# Patient Record
Sex: MALE | Race: WHITE | Employment: OTHER | URBAN - METROPOLITAN AREA
[De-identification: names, ages, dates, MRNs, and addresses within clinical notes are randomized per-mention and may not be internally consistent; named-entity substitution may affect disease eponyms.]

---

## 2023-02-23 ENCOUNTER — HOSPITAL ENCOUNTER (EMERGENCY)
Age: 88
Discharge: HOME | End: 2023-02-23
Attending: EMERGENCY MEDICINE
Payer: MEDICARE

## 2023-02-23 ENCOUNTER — APPOINTMENT (OUTPATIENT)
Dept: CT IMAGING | Age: 88
End: 2023-02-23
Payer: MEDICARE

## 2023-02-23 VITALS
DIASTOLIC BLOOD PRESSURE: 61 MMHG | TEMPERATURE: 98.2 F | BODY MASS INDEX: 26.05 KG/M2 | OXYGEN SATURATION: 97 % | SYSTOLIC BLOOD PRESSURE: 134 MMHG | WEIGHT: 182 LBS | RESPIRATION RATE: 21 BRPM | HEART RATE: 78 BPM | HEIGHT: 70 IN

## 2023-02-23 DIAGNOSIS — N17.9 AKI (ACUTE KIDNEY INJURY) (HCC): ICD-10-CM

## 2023-02-23 DIAGNOSIS — R82.81 PYURIA: ICD-10-CM

## 2023-02-23 DIAGNOSIS — R31.9 HEMATURIA, UNSPECIFIED TYPE: Primary | ICD-10-CM

## 2023-02-23 LAB
ANION GAP SERPL CALCULATED.3IONS-SCNC: 13 MMOL/L (ref 3–16)
BACTERIA: ABNORMAL /HPF
BASOPHILS ABSOLUTE: 0 K/UL (ref 0–0.2)
BASOPHILS RELATIVE PERCENT: 0.3 %
BILIRUBIN URINE: NEGATIVE
BLOOD, URINE: ABNORMAL
BUN BLDV-MCNC: 24 MG/DL (ref 7–20)
CALCIUM SERPL-MCNC: 9.3 MG/DL (ref 8.3–10.6)
CHLORIDE BLD-SCNC: 104 MMOL/L (ref 99–110)
CLARITY: ABNORMAL
CO2: 21 MMOL/L (ref 21–32)
COLOR: ABNORMAL
COMMENT UA: ABNORMAL
CREAT SERPL-MCNC: 2 MG/DL (ref 0.8–1.3)
EOSINOPHILS ABSOLUTE: 0 K/UL (ref 0–0.6)
EOSINOPHILS RELATIVE PERCENT: 0 %
GFR SERPL CREATININE-BSD FRML MDRD: 32 ML/MIN/{1.73_M2}
GLUCOSE BLD-MCNC: 129 MG/DL (ref 70–99)
GLUCOSE URINE: NEGATIVE MG/DL
HCT VFR BLD CALC: 38.6 % (ref 40.5–52.5)
HEMOGLOBIN: 12.3 G/DL (ref 13.5–17.5)
KETONES, URINE: 15 MG/DL
LEUKOCYTE ESTERASE, URINE: ABNORMAL
LYMPHOCYTES ABSOLUTE: 1 K/UL (ref 1–5.1)
LYMPHOCYTES RELATIVE PERCENT: 7.2 %
MCH RBC QN AUTO: 24.8 PG (ref 26–34)
MCHC RBC AUTO-ENTMCNC: 31.8 G/DL (ref 31–36)
MCV RBC AUTO: 78.1 FL (ref 80–100)
MICROSCOPIC EXAMINATION: YES
MONOCYTES ABSOLUTE: 0.8 K/UL (ref 0–1.3)
MONOCYTES RELATIVE PERCENT: 6.1 %
NEUTROPHILS ABSOLUTE: 12 K/UL (ref 1.7–7.7)
NEUTROPHILS RELATIVE PERCENT: 86.4 %
NITRITE, URINE: NEGATIVE
PDW BLD-RTO: 16.6 % (ref 12.4–15.4)
PH UA: 5.5 (ref 5–8)
PLATELET # BLD: 228 K/UL (ref 135–450)
PMV BLD AUTO: 8.2 FL (ref 5–10.5)
POTASSIUM REFLEX MAGNESIUM: 3.7 MMOL/L (ref 3.5–5.1)
PROTEIN UA: >=300 MG/DL
RBC # BLD: 4.95 M/UL (ref 4.2–5.9)
RBC UA: >100 /HPF (ref 0–4)
SODIUM BLD-SCNC: 138 MMOL/L (ref 136–145)
SPECIFIC GRAVITY UA: 1.02 (ref 1–1.03)
SPECIMEN STATUS: NORMAL
URINE REFLEX TO CULTURE: YES
URINE TYPE: ABNORMAL
UROBILINOGEN, URINE: 0.2 E.U./DL
WBC # BLD: 13.9 K/UL (ref 4–11)
WBC UA: ABNORMAL /HPF (ref 0–5)

## 2023-02-23 PROCEDURE — 74176 CT ABD & PELVIS W/O CONTRAST: CPT

## 2023-02-23 PROCEDURE — 87086 URINE CULTURE/COLONY COUNT: CPT

## 2023-02-23 PROCEDURE — 99284 EMERGENCY DEPT VISIT MOD MDM: CPT

## 2023-02-23 PROCEDURE — 2580000003 HC RX 258: Performed by: EMERGENCY MEDICINE

## 2023-02-23 PROCEDURE — 85025 COMPLETE CBC W/AUTO DIFF WBC: CPT

## 2023-02-23 PROCEDURE — 80048 BASIC METABOLIC PNL TOTAL CA: CPT

## 2023-02-23 PROCEDURE — 81001 URINALYSIS AUTO W/SCOPE: CPT

## 2023-02-23 RX ORDER — 0.9 % SODIUM CHLORIDE 0.9 %
500 INTRAVENOUS SOLUTION INTRAVENOUS ONCE
Status: COMPLETED | OUTPATIENT
Start: 2023-02-23 | End: 2023-02-23

## 2023-02-23 RX ORDER — CIPROFLOXACIN 500 MG/1
500 TABLET, FILM COATED ORAL 2 TIMES DAILY
Qty: 14 TABLET | Refills: 0 | Status: SHIPPED | OUTPATIENT
Start: 2023-02-23 | End: 2023-03-02

## 2023-02-23 RX ORDER — TAMSULOSIN HYDROCHLORIDE 0.4 MG/1
0.4 CAPSULE ORAL DAILY
Qty: 30 CAPSULE | Refills: 0 | Status: SHIPPED | OUTPATIENT
Start: 2023-02-23

## 2023-02-23 RX ADMIN — SODIUM CHLORIDE 500 ML: 9 INJECTION, SOLUTION INTRAVENOUS at 09:13

## 2023-02-23 NOTE — DISCHARGE INSTRUCTIONS
Urology would like you to follow-up within the next 2 days. We are prescribing antibiotics and Flomax. Take as prescribed. Be careful with standing as Flomax can make you lightheaded initially.   Return to emergency department with any new or emergent concerns

## 2023-02-23 NOTE — ED NOTES
Called [facility] @ [phone] RE: stent, pain   Requested Dr. Gila Pressley.   Arielle MODI returned call and spoke with Rock Murguia (medical student under supervision of Dr. Frederick Oh)     Qing Coronado  02/23/23 5488

## 2023-02-23 NOTE — ED PROVIDER NOTES
Emergency Department Provider Note  Location: [address] [place] [address]  ED  2/23/2023     Patient Identification  Rivas Reynolds is a 81 y.o. male    Chief Complaint  Hematuria (Pt had a kidney procedure done at [facility], has been having some blood in the urine. Also, been having some pain after I urinate. )          HPI  (History provided by patient)  Patient is an 69-year-old male with history of urothelial carcinoma of the bladder and neoplasm of kidney type uncertain hypertension hyperlipidemia who presents with chief complaint of hematuria and dysuria. Had cystoscopy performed yesterday by Dr. Devin Mai at [address]; RIGHT FLEXIBLE DIAGNOSTIC URETEROSCOPY; RIGHT URETERAL STENT PLACEMENT; ABLATION OF RIGHT RENAL PELCIS TUMOR; RIGHT RETROGRADE PYELOGRAM.      I have reviewed the following nursing documentation:  Allergies: Allergies   Allergen Reactions    Lisinopril Other (See Comments)     Possible Allergic reaction - developed Angioedema (tongue swelling) after 1 week of taking Doxycycline (only new medication). Cannot exclude Lisinopril as causative agent (chronic medication). Doxycycline Swelling     Possible Allergic reaction - developed Angioedema (tongue swelling) after 1 week of taking Doxycycline (only new medication). Cannot exclude Lisinopril as causative agent (chronic medication). Ibuprofen     Pcn [Penicillins]        Past medical history:  has a past medical history of Acid reflux, Asthenia pigmentosa ([address] BPH (benign prostatic hyperplasia), Hyperlipidemia, Hypertension, Irregular heart beat, Kidney stones, Legal blindness, and Retinitis. Past surgical history:  has a past surgical history that includes hernia repair (Right); Cholecystectomy; back surgery; TURP; eye surgery (Bilateral); and other surgical history (N/A, 10/14/2016). Home medications:   Prior to Admission medications    Medication Sig Start Date End Date Taking?  Authorizing Provider   tamsulosin (FLOMAX) 0.4 MG capsule Take 1 capsule by mouth daily 2/23/23  Yes Hunter Sandvoal MD   ciprofloxacin (CIPRO) 500 MG tablet Take 1 tablet by mouth 2 times daily for 7 days 2/23/23 3/2/23 Yes Hunter Sandoval MD   famotidine (PEPCID) 20 MG tablet Take 1 tablet by mouth 2 times daily for 7 days 10/25/16 11/1/16  Pradeep Vivar MD   hydrochlorothiazide (HYDRODIURIL) 25 MG tablet Take 0.5 tablets by mouth daily 10/25/16 11/24/16  Pradeep Vivar MD   Calcium Carbonate-Vitamin D (CALCIUM-VITAMIN D) 500-200 MG-UNIT per tablet Take 1 tablet by mouth daily    Historical Provider, MD   Multiple Vitamins-Minerals (THERAPEUTIC MULTIVITAMIN-MINERALS) tablet Take 1 tablet by mouth daily    Historical Provider, MD   omeprazole (PRILOSEC) 20 MG delayed release capsule Take 20 mg by mouth daily  Patient not taking: Reported on 2/23/2023    Historical Provider, MD   simvastatin (ZOCOR) 20 MG tablet Take 20 mg by mouth nightly    Historical Provider, MD   Vitamin A Palmitate 45601 UNITS TABS Take 1 tablet by mouth daily  Patient not taking: Reported on 2/23/2023    Historical Provider, MD       Social history:  reports that he has never smoked. He has never used smokeless tobacco. He reports that he does not drink alcohol and does not use drugs.    Family history:    Family History   Problem Relation Age of Onset    Diabetes Mother     High Blood Pressure Mother     Heart Disease Father     Cancer Brother              Exam  ED Triage Vitals [02/23/23 0837]   BP Temp Temp Source Heart Rate Resp SpO2 Height Weight   (!) 157/80 98.2 °F (36.8 °C) Oral 82 20 99 % 5' 10\" (1.778 m) 182 lb (82.6 kg)       Physical Exam  Vitals and nursing note reviewed.   Constitutional:       General: He is not in acute distress.     Appearance: He is well-developed.   HENT:      Head: Normocephalic and atraumatic.      Nose: Nose normal. No congestion.   Eyes:      General: No scleral icterus.     Extraocular Movements: Extraocular  movements intact. Cardiovascular:      Rate and Rhythm: Normal rate and regular rhythm. Heart sounds: No murmur heard. Pulmonary:      Effort: Pulmonary effort is normal.      Breath sounds: Normal breath sounds. Abdominal:      General: There is no distension. Palpations: Abdomen is soft. Tenderness: There is no abdominal tenderness. There is no right CVA tenderness, left CVA tenderness, guarding or rebound. Musculoskeletal:         General: No deformity. Normal range of motion. Cervical back: Normal range of motion and neck supple. Skin:     General: Skin is warm. Findings: No rash. Neurological:      Mental Status: He is alert and oriented to person, place, and time. Motor: No abnormal muscle tone. Coordination: Coordination normal.   Psychiatric:         Mood and Affect: Mood normal.         Behavior: Behavior normal.         MDM/ED Course    ED Medication Orders (From admission, onward)      Start Ordered     Status Ordering Provider    02/23/23 0915 02/23/23 0906  0.9 % sodium chloride bolus  ONCE         Last MAR action: Stopped - by Jen Harris on 02/23/23 at [address] [facility], [address] [facility] [address]              Radiology  CT ABDOMEN PELVIS WO CONTRAST Additional Contrast? None    Result Date: 2/23/2023  EXAMINATION: CT OF THE ABDOMEN AND PELVIS WITHOUT CONTRAST 2/23/2023 9:19 am TECHNIQUE: CT of the abdomen and pelvis was performed without the administration of intravenous contrast. Multiplanar reformatted images are provided for review. Automated exposure control, iterative reconstruction, and/or weight based adjustment of the mA/kV was utilized to reduce the radiation dose to as low as reasonably achievable. COMPARISON: None.  HISTORY: ORDERING SYSTEM PROVIDED HISTORY: recent ureter stent placment, right flank pain TECHNOLOGIST PROVIDED HISTORY: Reason for exam:->recent ureter stent placment, right flank pain Additional Contrast?->None Decision Support Exception - unselect if not a suspected or confirmed emergency medical condition->Emergency Medical Condition (MA) Reason for Exam: right ureter placement yesterday, c/o pain when urinating with n/v, right flank pain, hematuria Relevant Medical/Surgical History: h/o renal ca FINDINGS: Lower Chest: Scattered bandlike opacities are seen throughout the lungs, likely atelectasis. Shea Benton No pleural effusions noted. Organs: No splenomegaly. No perisplenic fluid. Adrenal glands unremarkable. Clips are seen from prior cholecystectomy. Pneumobilia is seen in the left hepatic lobe. No pancreatic calcification noted. No peripancreatic fluid Gas is seen in the collecting system on the right. Ureteral stent is seen on the right, in its expected location. A dominant cyst projects off the right kidney posteriorly measuring 5.7 cm. A smaller cyst is seen anteriorly. There is increased density seen in the collecting system on the right and right renal pelvis, most likely secondary to hemorrhage. There is mild right-sided hydronephrosis. Right ureter is not dilated. There is perinephric fat stranding on the right. There is stranding and scattered ill-defined fluid seen in the retroperitoneum on the right. Tiny stone is seen inferiorly in the right kidney, measuring 2 mm Dominant cyst is seen in the left kidney, measuring 6.5 cm. Small hyperdense nodules are seen in the left kidney, measuring 1.1 cm, most commonly hemorrhagic or proteinaceous cyst.  No stones or hydronephrosis noted on left. GI/Bowel: No significant small bowel distention noted. Mild stool load seen in colon. No bowel obstruction. A few colonic diverticula are seen. Pelvis: Gas is seen within the bladder. Prostate measures 6.5 cm x 5.0 cm. Prostate indents the base of the bladder. Peritoneum/Retroperitoneum: Atherosclerotic change seen in aorta. No aneurysm. No retroperitoneal adenopathy. Bones/Soft Tissues: Postfusion changes are seen in the spine.   Scattered degenerative changes are seen in the spine. Degenerative changes are seen in the hips. Ureteral stent is seen on the right in its expected location. There is mild right-sided hydronephrosis seen. Hemorrhage is seen in the right renal collecting system-right renal pelvis. Recommend correlation with outside studies or continued clinical follow-up as this area of hyperdensity in the renal pelvis could also represent mass. Gas is seen in the bladder and collecting system on the right, likely from stent placement. Tiny stone inferiorly in the right kidney Ill-defined fluid and stranding is seen surrounding the right kidney in the retroperitoneum on the right, likely postprocedure related. Diverticulosis. No bowel obstruction Prostatomegaly      Bedside Ultrasound  No results found.        Labs  Results for orders placed or performed during the hospital encounter of 02/23/23   CBC with Auto Differential   Result Value Ref Range    WBC 13.9 (H) 4.0 - 11.0 K/uL    RBC 4.95 4.20 - 5.90 M/uL    Hemoglobin 12.3 (L) 13.5 - 17.5 g/dL    Hematocrit 38.6 (L) 40.5 - 52.5 %    MCV 78.1 (L) 80.0 - 100.0 fL    MCH 24.8 (L) 26.0 - 34.0 pg    MCHC 31.8 31.0 - 36.0 g/dL    RDW 16.6 (H) 12.4 - 15.4 %    Platelets 605 009 - 196 K/uL    MPV 8.2 5.0 - 10.5 fL    Neutrophils % 86.4 %    Lymphocytes % 7.2 %    Monocytes % 6.1 %    Eosinophils % 0.0 %    Basophils % 0.3 %    Neutrophils Absolute 12.0 (H) 1.7 - 7.7 K/uL    Lymphocytes Absolute 1.0 1.0 - 5.1 K/uL    Monocytes Absolute 0.8 0.0 - 1.3 K/uL    Eosinophils Absolute 0.0 0.0 - 0.6 K/uL    Basophils Absolute 0.0 0.0 - 0.2 K/uL   Basic Metabolic Panel w/ Reflex to MG   Result Value Ref Range    Sodium 138 136 - 145 mmol/L    Potassium reflex Magnesium 3.7 3.5 - 5.1 mmol/L    Chloride 104 99 - 110 mmol/L    CO2 21 21 - 32 mmol/L    Anion Gap 13 3 - 16    Glucose 129 (H) 70 - 99 mg/dL    BUN 24 (H) 7 - 20 mg/dL    Creatinine 2.0 (H) 0.8 - 1.3 mg/dL    Est, Glom Filt Rate 32 (A) >60    Calcium 9.3 8.3 - 10.6 mg/dL   Urinalysis with Reflex to Culture    Specimen: Urine   Result Value Ref Range    Color, UA RED (A) Straw/Yellow    Clarity, UA TURBID (A) Clear    Glucose, Ur Negative Negative mg/dL    Bilirubin Urine Negative Negative    Ketones, Urine 15 (A) Negative mg/dL    Specific Gravity, UA 1.025 1.005 - 1.030    Blood, Urine LARGE (A) Negative    pH, UA 5.5 5.0 - 8.0    Protein, UA >=300 (A) Negative mg/dL    Urobilinogen, Urine 0.2 <2.0 E.U./dL    Nitrite, Urine Negative Negative    Leukocyte Esterase, Urine SMALL (A) Negative    Microscopic Examination YES     Urine Type NotGiven     Urine Reflex to Culture Yes    Sample possible blood bank testing   Result Value Ref Range    Specimen Status HOLLY    Microscopic Urinalysis   Result Value Ref Range    WBC, UA 10-20 (A) 0 - 5 /HPF    RBC, UA >100 (A) 0 - 4 /HPF    Bacteria, UA 4+ (A) None Seen /HPF    Urinalysis Comments see below          Procedures  Procedures      Patient seen and evaluated. Relevant records reviewed, specifically surgical note from [facility] Dr. Olivia Dacosta yesterday showing procedures CYSTOSCOPY; RIGHT FLEXIBLE DIAGNOSTIC URETEROSCOPY; RIGHT URETERAL STENT PLACEMENT; ABLATION OF RIGHT RENAL PELCIS TUMOR; RIGHT RETROGRADE PYELOGRAM.  - Patient is 81 y.o. male presented for acute hematuria in setting or chronic conditions urothelial carcinoma and neoplasm of kidney on certain type hypertension hyperlipidemia.  - Exam showed well-appearing elderly male no acute distress currently pain-free lying in bed. He has reassuring vital signs. He has no significant tenderness to the abdomen or the flank or CVA  - Patient was placed on telemetry during his/her ED stay and no malignant dysrhythmia observed. - Diagnostic studies reviewed. CT abdomen pelvis shows evidence of small hemorrhage versus mass in the right renal pelvis which correlates with his known mass/neoplasm. There is no ureteral tear and no severe hydronephrosis noted.   - Lab: CMP is notable for elevated creatinine at 2.0 which is elevated from priors that we have on record 1.3 which may be secondary to recent procedure or dehydration. We will treat him with IV fluids. Also noted pyuria which may be reactive nitrite negative but questionable for infection. We will treat conservatively with Keflex antibiotic. Otherwise CBC did not show clinically significant pathology.   -Consulted urology and spoke with PA covering for Dr. Mariela Ellington who discussed the case and the CT findings and lab findings with Dr. Mariela Ellington and feel that he would be appropriate for very close outpatient follow-up. I do think this is reasonable as the primary reason for his visit today appears to be for worse pain than was expected or discussed to the patient at the time of discharge with urination. Have low concern for any emergent pathology at this point and feel that it is in the best interest of the patient to discharge home rather than keeping him in the hospital.    - Is this patient to be included in the SEP-1 Core Measure due to severe sepsis or septic shock? No   Exclusion criteria - the patient is NOT to be included for SEP-1 Core Measure due to:  2+ SIRS criteria are not met    - Patient was given    ED Medication Orders (From admission, onward)      Start Ordered     Status Ordering Provider    02/23/23 0915 02/23/23 0906  0.9 % sodium chloride bolus  ONCE         Last MAR action: Stopped - by Oralia Urbina on 02/23/23 at [address] [facility], [place] L              - I discussed the results with patient/family including incidental findings . We agreed to d/c  - At this point I do not see indication for further work-up in the ER, as it is unlikely  and poses more risk than benefit. - Return precautions also discussed. Patient/family verbalized understanding of care plan and agreeable to plan. Clinical Impression:  1. Hematuria, unspecified type    2.  TIERA (acute kidney injury) ([facility]    3. Pyuria          Disposition:  Discharge to home in good condition. Blood pressure 134/61, pulse 78, temperature 98.2 °F (36.8 °C), temperature source Oral, resp. rate 21, height 5' 10\" (1.778 m), weight 182 lb (82.6 kg), SpO2 97 %. Patient was given scripts for the following medications. I counseled patient how to take these medications. Discharge Medication List as of 2/23/2023 11:40 AM        START taking these medications    Details   tamsulosin (FLOMAX) 0.4 MG capsule Take 1 capsule by mouth daily, Disp-30 capsule, R-0Print      ciprofloxacin (CIPRO) 500 MG tablet Take 1 tablet by mouth 2 times daily for 7 days, Disp-14 tablet, R-0Print           Discharge Medication List as of 2/23/2023 11:40 AM          Disposition referral (if applicable):  Halle Goodwin MD    Follow up  call Dr Staci Friend for an appt for follow up    [facility] [address] [address]  [phone]    Schedule an appointment as soon as possible for a visit       I, Vearl Lanes, am the primary attending of record and contributed the majority of evaluation and treatment of emergent care for this encounter. Total critical care time is 0 minutes, which excludes separately billable procedures and updating family. Time spent is specifically for management of the presenting complaint and symptoms initially, direct bedside care, reevaluation, review of records, and consultation. There was a high probability of clinically significant life-threatening deterioration in the patient's condition, which required my urgent intervention. This chart was generated in part by using Dragon Dictation system and may contain errors related to that system including errors in grammar, punctuation, and spelling, as well as words and phrases that may be inappropriate. If there are any questions or concerns please feel free to contact the dictating provider for clarification.      Hunter HANKS Fidel Sandoval MD  02/24/23 1970

## 2023-02-24 LAB — URINE CULTURE, ROUTINE: NORMAL

## 2024-07-05 ENCOUNTER — HOSPITAL ENCOUNTER (INPATIENT)
Age: 89
LOS: 4 days | Discharge: HOME HEALTH | DRG: 988 | End: 2024-07-09
Attending: EMERGENCY MEDICINE | Admitting: INTERNAL MEDICINE
Payer: MEDICARE

## 2024-07-05 ENCOUNTER — APPOINTMENT (OUTPATIENT)
Dept: GENERAL RADIOLOGY | Age: 89
DRG: 988 | End: 2024-07-05
Payer: MEDICARE

## 2024-07-05 DIAGNOSIS — I48.91 ATRIAL FIBRILLATION, UNSPECIFIED TYPE (HCC): ICD-10-CM

## 2024-07-05 DIAGNOSIS — N32.89 BLOOD CLOT IN BLADDER: ICD-10-CM

## 2024-07-05 DIAGNOSIS — I48.0 PAROXYSMAL A-FIB (HCC): ICD-10-CM

## 2024-07-05 DIAGNOSIS — S30.1XXA ABDOMINAL WALL SEROMA, INITIAL ENCOUNTER: ICD-10-CM

## 2024-07-05 DIAGNOSIS — I48.91 ATRIAL FIBRILLATION WITH RVR (HCC): Primary | ICD-10-CM

## 2024-07-05 LAB
ABO + RH BLD: NORMAL
ALBUMIN SERPL-MCNC: 2.8 G/DL (ref 3.4–5)
ALBUMIN/GLOB SERPL: 1 {RATIO} (ref 1.1–2.2)
ALP SERPL-CCNC: 67 U/L (ref 40–129)
ALT SERPL-CCNC: 19 U/L (ref 10–40)
ANION GAP SERPL CALCULATED.3IONS-SCNC: 10 MMOL/L (ref 3–16)
AST SERPL-CCNC: 18 U/L (ref 15–37)
BASOPHILS # BLD: 0 K/UL (ref 0–0.2)
BASOPHILS NFR BLD: 0.5 %
BILIRUB SERPL-MCNC: 0.7 MG/DL (ref 0–1)
BLD GP AB SCN SERPL QL: NORMAL
BUN SERPL-MCNC: 15 MG/DL (ref 7–20)
CALCIUM SERPL-MCNC: 8.2 MG/DL (ref 8.3–10.6)
CHLORIDE SERPL-SCNC: 104 MMOL/L (ref 99–110)
CO2 SERPL-SCNC: 21 MMOL/L (ref 21–32)
CREAT SERPL-MCNC: 1.5 MG/DL (ref 0.8–1.3)
DEPRECATED RDW RBC AUTO: 19.3 % (ref 12.4–15.4)
EOSINOPHIL # BLD: 0.3 K/UL (ref 0–0.6)
EOSINOPHIL NFR BLD: 4 %
GFR SERPLBLD CREATININE-BSD FMLA CKD-EPI: 44 ML/MIN/{1.73_M2}
GLUCOSE SERPL-MCNC: 140 MG/DL (ref 70–99)
HCT VFR BLD AUTO: 28.2 % (ref 40.5–52.5)
HGB BLD-MCNC: 9.3 G/DL (ref 13.5–17.5)
INR PPP: 1.26 (ref 0.85–1.15)
LYMPHOCYTES # BLD: 0.6 K/UL (ref 1–5.1)
LYMPHOCYTES NFR BLD: 7.7 %
MAGNESIUM SERPL-MCNC: 1.6 MG/DL (ref 1.8–2.4)
MCH RBC QN AUTO: 28.2 PG (ref 26–34)
MCHC RBC AUTO-ENTMCNC: 32.9 G/DL (ref 31–36)
MCV RBC AUTO: 85.7 FL (ref 80–100)
MONOCYTES # BLD: 0.5 K/UL (ref 0–1.3)
MONOCYTES NFR BLD: 6.8 %
NEUTROPHILS # BLD: 6.1 K/UL (ref 1.7–7.7)
NEUTROPHILS NFR BLD: 81 %
PLATELET # BLD AUTO: 182 K/UL (ref 135–450)
PMV BLD AUTO: 7.6 FL (ref 5–10.5)
POTASSIUM SERPL-SCNC: 3.5 MMOL/L (ref 3.5–5.1)
PROT SERPL-MCNC: 5.5 G/DL (ref 6.4–8.2)
PROTHROMBIN TIME: 16 SEC (ref 11.9–14.9)
RBC # BLD AUTO: 3.29 M/UL (ref 4.2–5.9)
SODIUM SERPL-SCNC: 135 MMOL/L (ref 136–145)
WBC # BLD AUTO: 7.6 K/UL (ref 4–11)

## 2024-07-05 PROCEDURE — 6360000002 HC RX W HCPCS: Performed by: INTERNAL MEDICINE

## 2024-07-05 PROCEDURE — 87449 NOS EACH ORGANISM AG IA: CPT

## 2024-07-05 PROCEDURE — 2500000003 HC RX 250 WO HCPCS: Performed by: EMERGENCY MEDICINE

## 2024-07-05 PROCEDURE — 2060000000 HC ICU INTERMEDIATE R&B

## 2024-07-05 PROCEDURE — 2580000003 HC RX 258: Performed by: EMERGENCY MEDICINE

## 2024-07-05 PROCEDURE — 87081 CULTURE SCREEN ONLY: CPT

## 2024-07-05 PROCEDURE — 87040 BLOOD CULTURE FOR BACTERIA: CPT

## 2024-07-05 PROCEDURE — 96374 THER/PROPH/DIAG INJ IV PUSH: CPT

## 2024-07-05 PROCEDURE — 85025 COMPLETE CBC W/AUTO DIFF WBC: CPT

## 2024-07-05 PROCEDURE — 86850 RBC ANTIBODY SCREEN: CPT

## 2024-07-05 PROCEDURE — 85610 PROTHROMBIN TIME: CPT

## 2024-07-05 PROCEDURE — 83735 ASSAY OF MAGNESIUM: CPT

## 2024-07-05 PROCEDURE — 86900 BLOOD TYPING SEROLOGIC ABO: CPT

## 2024-07-05 PROCEDURE — 2580000003 HC RX 258: Performed by: INTERNAL MEDICINE

## 2024-07-05 PROCEDURE — 80053 COMPREHEN METABOLIC PANEL: CPT

## 2024-07-05 PROCEDURE — 86901 BLOOD TYPING SEROLOGIC RH(D): CPT

## 2024-07-05 PROCEDURE — 71045 X-RAY EXAM CHEST 1 VIEW: CPT

## 2024-07-05 PROCEDURE — 99285 EMERGENCY DEPT VISIT HI MDM: CPT

## 2024-07-05 RX ORDER — SODIUM CHLORIDE 0.9 % (FLUSH) 0.9 %
5-40 SYRINGE (ML) INJECTION PRN
Status: DISCONTINUED | OUTPATIENT
Start: 2024-07-05 | End: 2024-07-09 | Stop reason: HOSPADM

## 2024-07-05 RX ORDER — DILTIAZEM HYDROCHLORIDE 5 MG/ML
10 INJECTION INTRAVENOUS ONCE
Status: COMPLETED | OUTPATIENT
Start: 2024-07-05 | End: 2024-07-05

## 2024-07-05 RX ORDER — ENOXAPARIN SODIUM 100 MG/ML
40 INJECTION SUBCUTANEOUS DAILY
Status: DISCONTINUED | OUTPATIENT
Start: 2024-07-05 | End: 2024-07-09 | Stop reason: HOSPADM

## 2024-07-05 RX ORDER — SODIUM CHLORIDE 9 MG/ML
INJECTION, SOLUTION INTRAVENOUS PRN
Status: DISCONTINUED | OUTPATIENT
Start: 2024-07-05 | End: 2024-07-09 | Stop reason: HOSPADM

## 2024-07-05 RX ORDER — ONDANSETRON 4 MG/1
4 TABLET, ORALLY DISINTEGRATING ORAL EVERY 8 HOURS PRN
Status: DISCONTINUED | OUTPATIENT
Start: 2024-07-05 | End: 2024-07-09 | Stop reason: HOSPADM

## 2024-07-05 RX ORDER — SODIUM CHLORIDE, SODIUM LACTATE, POTASSIUM CHLORIDE, CALCIUM CHLORIDE 600; 310; 30; 20 MG/100ML; MG/100ML; MG/100ML; MG/100ML
INJECTION, SOLUTION INTRAVENOUS CONTINUOUS
Status: DISCONTINUED | OUTPATIENT
Start: 2024-07-05 | End: 2024-07-08

## 2024-07-05 RX ORDER — SODIUM CHLORIDE, SODIUM LACTATE, POTASSIUM CHLORIDE, AND CALCIUM CHLORIDE .6; .31; .03; .02 G/100ML; G/100ML; G/100ML; G/100ML
1000 INJECTION, SOLUTION INTRAVENOUS ONCE
Status: COMPLETED | OUTPATIENT
Start: 2024-07-05 | End: 2024-07-05

## 2024-07-05 RX ORDER — ACETAMINOPHEN 325 MG/1
650 TABLET ORAL EVERY 6 HOURS PRN
Status: DISCONTINUED | OUTPATIENT
Start: 2024-07-05 | End: 2024-07-09 | Stop reason: HOSPADM

## 2024-07-05 RX ORDER — SODIUM CHLORIDE 0.9 % (FLUSH) 0.9 %
5-40 SYRINGE (ML) INJECTION EVERY 12 HOURS SCHEDULED
Status: DISCONTINUED | OUTPATIENT
Start: 2024-07-05 | End: 2024-07-09 | Stop reason: HOSPADM

## 2024-07-05 RX ORDER — POLYETHYLENE GLYCOL 3350 17 G/17G
17 POWDER, FOR SOLUTION ORAL DAILY PRN
Status: DISCONTINUED | OUTPATIENT
Start: 2024-07-05 | End: 2024-07-09 | Stop reason: HOSPADM

## 2024-07-05 RX ORDER — IPRATROPIUM BROMIDE AND ALBUTEROL SULFATE 2.5; .5 MG/3ML; MG/3ML
1 SOLUTION RESPIRATORY (INHALATION) EVERY 4 HOURS PRN
Status: DISCONTINUED | OUTPATIENT
Start: 2024-07-05 | End: 2024-07-09 | Stop reason: HOSPADM

## 2024-07-05 RX ORDER — ACETAMINOPHEN 650 MG/1
650 SUPPOSITORY RECTAL EVERY 6 HOURS PRN
Status: DISCONTINUED | OUTPATIENT
Start: 2024-07-05 | End: 2024-07-09 | Stop reason: HOSPADM

## 2024-07-05 RX ORDER — ONDANSETRON 2 MG/ML
4 INJECTION INTRAMUSCULAR; INTRAVENOUS EVERY 6 HOURS PRN
Status: DISCONTINUED | OUTPATIENT
Start: 2024-07-05 | End: 2024-07-09 | Stop reason: HOSPADM

## 2024-07-05 RX ADMIN — SODIUM CHLORIDE, POTASSIUM CHLORIDE, SODIUM LACTATE AND CALCIUM CHLORIDE: 600; 310; 30; 20 INJECTION, SOLUTION INTRAVENOUS at 22:56

## 2024-07-05 RX ADMIN — ENOXAPARIN SODIUM 40 MG: 100 INJECTION SUBCUTANEOUS at 21:23

## 2024-07-05 RX ADMIN — SODIUM CHLORIDE: 9 INJECTION, SOLUTION INTRAVENOUS at 21:52

## 2024-07-05 RX ADMIN — SODIUM CHLORIDE 5 MG/HR: 900 INJECTION, SOLUTION INTRAVENOUS at 19:55

## 2024-07-05 RX ADMIN — AZITHROMYCIN MONOHYDRATE 500 MG: 500 INJECTION, POWDER, LYOPHILIZED, FOR SOLUTION INTRAVENOUS at 22:31

## 2024-07-05 RX ADMIN — CEFTRIAXONE SODIUM 1000 MG: 1 INJECTION, POWDER, FOR SOLUTION INTRAMUSCULAR; INTRAVENOUS at 21:53

## 2024-07-05 RX ADMIN — SODIUM CHLORIDE, POTASSIUM CHLORIDE, SODIUM LACTATE AND CALCIUM CHLORIDE 1000 ML: 600; 310; 30; 20 INJECTION, SOLUTION INTRAVENOUS at 19:50

## 2024-07-05 RX ADMIN — DILTIAZEM HYDROCHLORIDE 10 MG: 5 INJECTION, SOLUTION INTRAVENOUS at 19:52

## 2024-07-05 ASSESSMENT — PAIN - FUNCTIONAL ASSESSMENT: PAIN_FUNCTIONAL_ASSESSMENT: NONE - DENIES PAIN

## 2024-07-05 NOTE — ED PROVIDER NOTES
was given scripts for the following medications. I counseled patient how to take these medications:  Current Discharge Medication List          DISCONTINUED MEDICATIONS:  Current Discharge Medication List                 (This chart was generated in part by using Dragon Dictation system and may contain errors related to that system including errors in grammar, punctuation, and spelling, as well as words and phrases that may be inappropriate. If there are any questions or concerns please feel free to contact the dictating provider for clarification.)    MD Moy Gomes Benjamin, MD  07/05/24 9153

## 2024-07-06 ENCOUNTER — APPOINTMENT (OUTPATIENT)
Dept: CT IMAGING | Age: 89
DRG: 988 | End: 2024-07-06
Payer: MEDICARE

## 2024-07-06 LAB
ALBUMIN SERPL-MCNC: 2.6 G/DL (ref 3.4–5)
ALBUMIN/GLOB SERPL: 1.2 {RATIO} (ref 1.1–2.2)
ALP SERPL-CCNC: 58 U/L (ref 40–129)
ALT SERPL-CCNC: 15 U/L (ref 10–40)
ANION GAP SERPL CALCULATED.3IONS-SCNC: 11 MMOL/L (ref 3–16)
AST SERPL-CCNC: 13 U/L (ref 15–37)
BASOPHILS # BLD: 0 K/UL (ref 0–0.2)
BASOPHILS NFR BLD: 0.7 %
BILIRUB SERPL-MCNC: 0.6 MG/DL (ref 0–1)
BUN SERPL-MCNC: 13 MG/DL (ref 7–20)
CALCIUM SERPL-MCNC: 7.9 MG/DL (ref 8.3–10.6)
CHLORIDE SERPL-SCNC: 109 MMOL/L (ref 99–110)
CO2 SERPL-SCNC: 20 MMOL/L (ref 21–32)
CREAT SERPL-MCNC: 1.4 MG/DL (ref 0.8–1.3)
DEPRECATED RDW RBC AUTO: 19.3 % (ref 12.4–15.4)
EKG ATRIAL RATE: 74 BPM
EKG DIAGNOSIS: NORMAL
EKG P AXIS: 3 DEGREES
EKG P-R INTERVAL: 142 MS
EKG Q-T INTERVAL: 388 MS
EKG QRS DURATION: 94 MS
EKG QTC CALCULATION (BAZETT): 464 MS
EKG R AXIS: 2 DEGREES
EKG T AXIS: 58 DEGREES
EKG VENTRICULAR RATE: 86 BPM
EOSINOPHIL # BLD: 0.4 K/UL (ref 0–0.6)
EOSINOPHIL NFR BLD: 5.9 %
FERRITIN SERPL IA-MCNC: 156.2 NG/ML (ref 30–400)
GFR SERPLBLD CREATININE-BSD FMLA CKD-EPI: 48 ML/MIN/{1.73_M2}
GLUCOSE SERPL-MCNC: 99 MG/DL (ref 70–99)
HCT VFR BLD AUTO: 25.7 % (ref 40.5–52.5)
HCT VFR BLD AUTO: 28 % (ref 40.5–52.5)
HGB BLD-MCNC: 8.3 G/DL (ref 13.5–17.5)
HGB BLD-MCNC: 8.9 G/DL (ref 13.5–17.5)
IRON SATN MFR SERPL: 11 % (ref 20–50)
IRON SERPL-MCNC: 20 UG/DL (ref 59–158)
LACTATE BLDV-SCNC: 0.8 MMOL/L (ref 0.4–2)
LYMPHOCYTES # BLD: 0.7 K/UL (ref 1–5.1)
LYMPHOCYTES NFR BLD: 10.9 %
MAGNESIUM SERPL-MCNC: 1.7 MG/DL (ref 1.8–2.4)
MCH RBC QN AUTO: 27.6 PG (ref 26–34)
MCHC RBC AUTO-ENTMCNC: 32.4 G/DL (ref 31–36)
MCV RBC AUTO: 85.1 FL (ref 80–100)
MONOCYTES # BLD: 0.6 K/UL (ref 0–1.3)
MONOCYTES NFR BLD: 8.5 %
NEUTROPHILS # BLD: 5 K/UL (ref 1.7–7.7)
NEUTROPHILS NFR BLD: 74 %
PLATELET # BLD AUTO: 175 K/UL (ref 135–450)
PMV BLD AUTO: 7.6 FL (ref 5–10.5)
POTASSIUM SERPL-SCNC: 3.5 MMOL/L (ref 3.5–5.1)
PROCALCITONIN SERPL IA-MCNC: 0.16 NG/ML (ref 0–0.15)
PROT SERPL-MCNC: 4.7 G/DL (ref 6.4–8.2)
RBC # BLD AUTO: 3.02 M/UL (ref 4.2–5.9)
SODIUM SERPL-SCNC: 140 MMOL/L (ref 136–145)
TIBC SERPL-MCNC: 181 UG/DL (ref 260–445)
TROPONIN, HIGH SENSITIVITY: 63 NG/L (ref 0–22)
WBC # BLD AUTO: 6.7 K/UL (ref 4–11)

## 2024-07-06 PROCEDURE — 2580000003 HC RX 258: Performed by: EMERGENCY MEDICINE

## 2024-07-06 PROCEDURE — 84145 PROCALCITONIN (PCT): CPT

## 2024-07-06 PROCEDURE — 93010 ELECTROCARDIOGRAM REPORT: CPT | Performed by: INTERNAL MEDICINE

## 2024-07-06 PROCEDURE — 85018 HEMOGLOBIN: CPT

## 2024-07-06 PROCEDURE — 84484 ASSAY OF TROPONIN QUANT: CPT

## 2024-07-06 PROCEDURE — 83735 ASSAY OF MAGNESIUM: CPT

## 2024-07-06 PROCEDURE — 6360000002 HC RX W HCPCS: Performed by: INTERNAL MEDICINE

## 2024-07-06 PROCEDURE — 2580000003 HC RX 258: Performed by: INTERNAL MEDICINE

## 2024-07-06 PROCEDURE — 2500000003 HC RX 250 WO HCPCS: Performed by: EMERGENCY MEDICINE

## 2024-07-06 PROCEDURE — 36415 COLL VENOUS BLD VENIPUNCTURE: CPT

## 2024-07-06 PROCEDURE — 6370000000 HC RX 637 (ALT 250 FOR IP): Performed by: NURSE PRACTITIONER

## 2024-07-06 PROCEDURE — 94640 AIRWAY INHALATION TREATMENT: CPT

## 2024-07-06 PROCEDURE — 99222 1ST HOSP IP/OBS MODERATE 55: CPT | Performed by: INTERNAL MEDICINE

## 2024-07-06 PROCEDURE — 83540 ASSAY OF IRON: CPT

## 2024-07-06 PROCEDURE — 6370000000 HC RX 637 (ALT 250 FOR IP): Performed by: INTERNAL MEDICINE

## 2024-07-06 PROCEDURE — 97162 PT EVAL MOD COMPLEX 30 MIN: CPT

## 2024-07-06 PROCEDURE — 97530 THERAPEUTIC ACTIVITIES: CPT

## 2024-07-06 PROCEDURE — 74176 CT ABD & PELVIS W/O CONTRAST: CPT

## 2024-07-06 PROCEDURE — 97166 OT EVAL MOD COMPLEX 45 MIN: CPT

## 2024-07-06 PROCEDURE — 85014 HEMATOCRIT: CPT

## 2024-07-06 PROCEDURE — 83550 IRON BINDING TEST: CPT

## 2024-07-06 PROCEDURE — 97535 SELF CARE MNGMENT TRAINING: CPT

## 2024-07-06 PROCEDURE — 85025 COMPLETE CBC W/AUTO DIFF WBC: CPT

## 2024-07-06 PROCEDURE — 93005 ELECTROCARDIOGRAM TRACING: CPT | Performed by: INTERNAL MEDICINE

## 2024-07-06 PROCEDURE — 80053 COMPREHEN METABOLIC PANEL: CPT

## 2024-07-06 PROCEDURE — 94761 N-INVAS EAR/PLS OXIMETRY MLT: CPT

## 2024-07-06 PROCEDURE — 2700000000 HC OXYGEN THERAPY PER DAY

## 2024-07-06 PROCEDURE — 2060000000 HC ICU INTERMEDIATE R&B

## 2024-07-06 PROCEDURE — 82728 ASSAY OF FERRITIN: CPT

## 2024-07-06 PROCEDURE — 83605 ASSAY OF LACTIC ACID: CPT

## 2024-07-06 RX ORDER — POTASSIUM CHLORIDE 7.45 MG/ML
10 INJECTION INTRAVENOUS PRN
Status: DISCONTINUED | OUTPATIENT
Start: 2024-07-06 | End: 2024-07-08

## 2024-07-06 RX ORDER — TAMSULOSIN HYDROCHLORIDE 0.4 MG/1
0.4 CAPSULE ORAL DAILY
Status: DISCONTINUED | OUTPATIENT
Start: 2024-07-06 | End: 2024-07-09 | Stop reason: HOSPADM

## 2024-07-06 RX ORDER — ATORVASTATIN CALCIUM 10 MG/1
20 TABLET, FILM COATED ORAL DAILY
Status: DISCONTINUED | OUTPATIENT
Start: 2024-07-06 | End: 2024-07-09 | Stop reason: HOSPADM

## 2024-07-06 RX ORDER — PANTOPRAZOLE SODIUM 40 MG/1
40 TABLET, DELAYED RELEASE ORAL
Status: DISCONTINUED | OUTPATIENT
Start: 2024-07-06 | End: 2024-07-09 | Stop reason: HOSPADM

## 2024-07-06 RX ORDER — POTASSIUM CHLORIDE 20 MEQ/1
40 TABLET, EXTENDED RELEASE ORAL PRN
Status: DISCONTINUED | OUTPATIENT
Start: 2024-07-06 | End: 2024-07-08

## 2024-07-06 RX ORDER — MAGNESIUM SULFATE IN WATER 40 MG/ML
2000 INJECTION, SOLUTION INTRAVENOUS PRN
Status: DISCONTINUED | OUTPATIENT
Start: 2024-07-06 | End: 2024-07-08

## 2024-07-06 RX ADMIN — SODIUM CHLORIDE, PRESERVATIVE FREE 10 ML: 5 INJECTION INTRAVENOUS at 08:10

## 2024-07-06 RX ADMIN — ATORVASTATIN CALCIUM 20 MG: 10 TABLET, FILM COATED ORAL at 09:38

## 2024-07-06 RX ADMIN — TAMSULOSIN HYDROCHLORIDE 0.4 MG: 0.4 CAPSULE ORAL at 09:34

## 2024-07-06 RX ADMIN — IPRATROPIUM BROMIDE AND ALBUTEROL SULFATE 1 DOSE: 2.5; .5 SOLUTION RESPIRATORY (INHALATION) at 12:15

## 2024-07-06 RX ADMIN — SODIUM CHLORIDE 7.5 MG/HR: 900 INJECTION, SOLUTION INTRAVENOUS at 06:05

## 2024-07-06 RX ADMIN — POTASSIUM CHLORIDE 40 MEQ: 1500 TABLET, EXTENDED RELEASE ORAL at 09:34

## 2024-07-06 RX ADMIN — AZITHROMYCIN MONOHYDRATE 500 MG: 500 INJECTION, POWDER, LYOPHILIZED, FOR SOLUTION INTRAVENOUS at 20:10

## 2024-07-06 RX ADMIN — SODIUM CHLORIDE, POTASSIUM CHLORIDE, SODIUM LACTATE AND CALCIUM CHLORIDE: 600; 310; 30; 20 INJECTION, SOLUTION INTRAVENOUS at 13:08

## 2024-07-06 RX ADMIN — SODIUM CHLORIDE, PRESERVATIVE FREE 10 ML: 5 INJECTION INTRAVENOUS at 20:05

## 2024-07-06 RX ADMIN — PANTOPRAZOLE SODIUM 40 MG: 40 TABLET, DELAYED RELEASE ORAL at 09:38

## 2024-07-06 RX ADMIN — SODIUM CHLORIDE, POTASSIUM CHLORIDE, SODIUM LACTATE AND CALCIUM CHLORIDE: 600; 310; 30; 20 INJECTION, SOLUTION INTRAVENOUS at 23:42

## 2024-07-06 RX ADMIN — CEFTRIAXONE SODIUM 1000 MG: 1 INJECTION, POWDER, FOR SOLUTION INTRAMUSCULAR; INTRAVENOUS at 20:17

## 2024-07-06 NOTE — PLAN OF CARE
Problem: Safety - Adult  Goal: Free from fall injury  Outcome: Progressing  Note: Pt will remain free from falls throughout hospital stay. Fall precautions in place, bed alarm on, bed in lowest position with wheels locked and side rails 2/4 up. Will continue to monitor throughout shift.      Problem: Skin/Tissue Integrity  Goal: Absence of new skin breakdown  Description: 1.  Monitor for areas of redness and/or skin breakdown  Outcome: Progressing     Problem: ABCDS Injury Assessment  Goal: Absence of physical injury  Outcome: Progressing

## 2024-07-06 NOTE — PLAN OF CARE
Problem: Discharge Planning  Goal: Discharge to home or other facility with appropriate resources  Outcome: Progressing  Flowsheets (Taken 7/6/2024 0800)  Discharge to home or other facility with appropriate resources:   Identify barriers to discharge with patient and caregiver   Identify discharge learning needs (meds, wound care, etc)   Arrange for needed discharge resources and transportation as appropriate   Arrange for interpreters to assist at discharge as needed   Refer to discharge planning if patient needs post-hospital services based on physician order or complex needs related to functional status, cognitive ability or social support system     Problem: Safety - Adult  Goal: Free from fall injury  7/6/2024 1426 by Delvin Francis, RN  Outcome: Progressing     Problem: Skin/Tissue Integrity  Goal: Absence of new skin breakdown  Description: 1.  Monitor for areas of redness and/or skin breakdown  2.  Assess vascular access sites hourly  3.  Every 4-6 hours minimum:  Change oxygen saturation probe site  4.  Every 4-6 hours:  If on nasal continuous positive airway pressure, respiratory therapy assess nares and determine need for appliance change or resting period.  7/6/2024 1426 by Delvin Francis, RN  Outcome: Progressing     Problem: ABCDS Injury Assessment  Goal: Absence of physical injury  7/6/2024 1426 by Delvin Francis, RN  Outcome: Progressing

## 2024-07-06 NOTE — RT PROTOCOL NOTE
RT Inhaler-Nebulizer Bronchodilator Protocol Note    There is a bronchodilator order in the chart from a provider indicating to follow the RT Bronchodilator Protocol and there is an “Initiate RT Inhaler-Nebulizer Bronchodilator Protocol” order as well (see protocol at bottom of note).    CXR Findings:  XR CHEST PORTABLE    Result Date: 7/5/2024  Right lower lung airspace disease is suspicious for pneumonia.       The findings from the last RT Protocol Assessment were as follows:   History Pulmonary Disease: None or smoker <15 pack years  Respiratory Pattern: Regular pattern and RR 12-20 bpm  Breath Sounds: Clear breath sounds  Cough: Strong, spontaneous, non-productive  Indication for Bronchodilator Therapy: None  Bronchodilator Assessment Score: 0    Aerosolized bronchodilator medication orders have been revised according to the RT Inhaler-Nebulizer Bronchodilator Protocol below.    Respiratory Therapist to perform RT Therapy Protocol Assessment initially then follow the protocol.  Repeat RT Therapy Protocol Assessment PRN for score 0-3 or on second treatment, BID, and PRN for scores above 3.    No Indications - adjust the frequency to every 6 hours PRN wheezing or bronchospasm, if no treatments needed after 48 hours then discontinue using Per Protocol order mode.     If indication present, adjust the RT bronchodilator orders based on the Bronchodilator Assessment Score as indicated below.  Use Inhaler orders unless patient has one or more of the following: on home nebulizer, not able to hold breath for 10 seconds, is not alert and oriented, cannot activate and use MDI correctly, or respiratory rate 25 breaths per minute or more, then use the equivalent nebulizer order(s) with same Frequency and PRN reasons based on the score.  If a patient is on this medication at home then do not decrease Frequency below that used at home.    0-3 - enter or revise RT bronchodilator order(s) to equivalent RT Bronchodilator order

## 2024-07-06 NOTE — ACP (ADVANCE CARE PLANNING)
Advance Care Planning     General Advance Care Planning (ACP) Conversation    Date of Conversation: 7/6/2024  Conducted with: Patient with Decision Making Capacity  Other persons present: None    Healthcare Decision Maker:   Primary Decision Maker: Coral Chirinos - Domestic Partner - [phone]  Click here to complete Healthcare Decision Makers including selection of the Healthcare Decision Maker Relationship (ie \"Primary\").   Today we documented Decision Maker(s) consistent with Legal Next of Kin hierarchy.    Content/Action Overview:  Has ACP document(s) on file - reflects the patient's care preferences          Length of Voluntary ACP Conversation in minutes:  <16 minutes (Non-Billable)    JOSE Wiggins

## 2024-07-06 NOTE — H&P
LUMBAR LAMINECTOMY AND INSTRUMENTED FUSION L4/5    TURP         Medications Prior to Admission:      Prior to Admission medications    Medication Sig Start Date End Date Taking? Authorizing Provider   tamsulosin (FLOMAX) 0.4 MG capsule Take 1 capsule by mouth daily 2/23/23   Hunter Sandoval MD   famotidine (PEPCID) 20 MG tablet Take 1 tablet by mouth 2 times daily for 7 days 10/25/16 11/1/16  Pradeep Vivar MD   hydrochlorothiazide (HYDRODIURIL) 25 MG tablet Take 0.5 tablets by mouth daily 10/25/16 11/24/16  Pradeep Vivar MD   Calcium Carbonate-Vitamin D (CALCIUM-VITAMIN D) 500-200 MG-UNIT per tablet Take 1 tablet by mouth daily    Ann Galaviz MD   Multiple Vitamins-Minerals (THERAPEUTIC MULTIVITAMIN-MINERALS) tablet Take 1 tablet by mouth daily    Ann Galaviz MD   omeprazole (PRILOSEC) 20 MG delayed release capsule Take 20 mg by mouth daily  Patient not taking: Reported on 2/23/2023    Ann Galaviz MD   simvastatin (ZOCOR) 20 MG tablet Take 20 mg by mouth nightly    Ann Galaviz MD   Vitamin A Palmitate 15428 UNITS TABS Take 1 tablet by mouth daily  Patient not taking: Reported on 2/23/2023    Ann Galaviz MD       Allergies:  Lisinopril, Doxycycline, Ibuprofen, and Pcn [penicillins]    Social History:          TOBACCO:   reports that he has never smoked. He has never used smokeless tobacco.  ETOH:   reports no history of alcohol use.  E-cigarette/Vaping       Questions Responses    E-cigarette/Vaping Use     Start Date     Passive Exposure     Quit Date     Counseling Given     Comments               Family History:      Reviewed and negative in regards to presenting illness/complaint.        Problem Relation Age of Onset    Diabetes Mother     High Blood Pressure Mother     Heart Disease Father     Cancer Brother        REVIEW OF SYSTEMS COMPLETED:   Pertinent positives as noted in the HPI. All other systems reviewed and negative.    PHYSICAL EXAM

## 2024-07-07 ENCOUNTER — APPOINTMENT (OUTPATIENT)
Dept: GENERAL RADIOLOGY | Age: 89
DRG: 988 | End: 2024-07-07
Payer: MEDICARE

## 2024-07-07 ENCOUNTER — ANESTHESIA EVENT (OUTPATIENT)
Dept: OPERATING ROOM | Age: 89
End: 2024-07-07
Payer: MEDICARE

## 2024-07-07 ENCOUNTER — ANESTHESIA (OUTPATIENT)
Dept: OPERATING ROOM | Age: 89
End: 2024-07-07
Payer: MEDICARE

## 2024-07-07 LAB
HCT VFR BLD AUTO: 26.9 % (ref 40.5–52.5)
HCT VFR BLD AUTO: 29 % (ref 40.5–52.5)
HCT VFR BLD AUTO: 30.8 % (ref 40.5–52.5)
HGB BLD-MCNC: 8.6 G/DL (ref 13.5–17.5)
HGB BLD-MCNC: 9.1 G/DL (ref 13.5–17.5)
HGB BLD-MCNC: 9.8 G/DL (ref 13.5–17.5)
LEGIONELLA AG UR QL: NORMAL
MRSA SPEC QL CULT: NORMAL
S PNEUM AG UR QL: NORMAL

## 2024-07-07 PROCEDURE — 36415 COLL VENOUS BLD VENIPUNCTURE: CPT

## 2024-07-07 PROCEDURE — 6360000002 HC RX W HCPCS: Performed by: ANESTHESIOLOGY

## 2024-07-07 PROCEDURE — 7100000001 HC PACU RECOVERY - ADDTL 15 MIN: Performed by: UROLOGY

## 2024-07-07 PROCEDURE — 2580000003 HC RX 258: Performed by: ANESTHESIOLOGY

## 2024-07-07 PROCEDURE — 85018 HEMOGLOBIN: CPT

## 2024-07-07 PROCEDURE — 3700000000 HC ANESTHESIA ATTENDED CARE: Performed by: UROLOGY

## 2024-07-07 PROCEDURE — 6360000002 HC RX W HCPCS: Performed by: NURSE PRACTITIONER

## 2024-07-07 PROCEDURE — 2709999900 HC NON-CHARGEABLE SUPPLY: Performed by: UROLOGY

## 2024-07-07 PROCEDURE — 6360000002 HC RX W HCPCS: Performed by: INTERNAL MEDICINE

## 2024-07-07 PROCEDURE — 2580000003 HC RX 258: Performed by: INTERNAL MEDICINE

## 2024-07-07 PROCEDURE — 3700000001 HC ADD 15 MINUTES (ANESTHESIA): Performed by: UROLOGY

## 2024-07-07 PROCEDURE — 2580000003 HC RX 258: Performed by: UROLOGY

## 2024-07-07 PROCEDURE — 2700000000 HC OXYGEN THERAPY PER DAY

## 2024-07-07 PROCEDURE — 94761 N-INVAS EAR/PLS OXIMETRY MLT: CPT

## 2024-07-07 PROCEDURE — 88307 TISSUE EXAM BY PATHOLOGIST: CPT

## 2024-07-07 PROCEDURE — 7100000000 HC PACU RECOVERY - FIRST 15 MIN: Performed by: UROLOGY

## 2024-07-07 PROCEDURE — 2060000000 HC ICU INTERMEDIATE R&B

## 2024-07-07 PROCEDURE — 3600000004 HC SURGERY LEVEL 4 BASE: Performed by: UROLOGY

## 2024-07-07 PROCEDURE — 0TCB8ZZ EXTIRPATION OF MATTER FROM BLADDER, VIA NATURAL OR ARTIFICIAL OPENING ENDOSCOPIC: ICD-10-PCS | Performed by: UROLOGY

## 2024-07-07 PROCEDURE — 85014 HEMATOCRIT: CPT

## 2024-07-07 PROCEDURE — 0TBB8ZZ EXCISION OF BLADDER, VIA NATURAL OR ARTIFICIAL OPENING ENDOSCOPIC: ICD-10-PCS | Performed by: UROLOGY

## 2024-07-07 PROCEDURE — 2580000003 HC RX 258: Performed by: NURSE PRACTITIONER

## 2024-07-07 PROCEDURE — 3600000014 HC SURGERY LEVEL 4 ADDTL 15MIN: Performed by: UROLOGY

## 2024-07-07 RX ORDER — SODIUM CHLORIDE 9 MG/ML
INJECTION, SOLUTION INTRAVENOUS PRN
Status: DISCONTINUED | OUTPATIENT
Start: 2024-07-07 | End: 2024-07-07 | Stop reason: HOSPADM

## 2024-07-07 RX ORDER — OXYCODONE HYDROCHLORIDE 5 MG/1
10 TABLET ORAL PRN
Status: DISCONTINUED | OUTPATIENT
Start: 2024-07-07 | End: 2024-07-07 | Stop reason: HOSPADM

## 2024-07-07 RX ORDER — SODIUM CHLORIDE, SODIUM LACTATE, POTASSIUM CHLORIDE, CALCIUM CHLORIDE 600; 310; 30; 20 MG/100ML; MG/100ML; MG/100ML; MG/100ML
INJECTION, SOLUTION INTRAVENOUS CONTINUOUS PRN
Status: DISCONTINUED | OUTPATIENT
Start: 2024-07-07 | End: 2024-07-07 | Stop reason: SDUPTHER

## 2024-07-07 RX ORDER — ONDANSETRON 2 MG/ML
INJECTION INTRAMUSCULAR; INTRAVENOUS PRN
Status: DISCONTINUED | OUTPATIENT
Start: 2024-07-07 | End: 2024-07-07 | Stop reason: SDUPTHER

## 2024-07-07 RX ORDER — SODIUM CHLORIDE 0.9 % (FLUSH) 0.9 %
5-40 SYRINGE (ML) INJECTION EVERY 12 HOURS SCHEDULED
Status: DISCONTINUED | OUTPATIENT
Start: 2024-07-07 | End: 2024-07-07 | Stop reason: HOSPADM

## 2024-07-07 RX ORDER — NALOXONE HYDROCHLORIDE 0.4 MG/ML
INJECTION, SOLUTION INTRAMUSCULAR; INTRAVENOUS; SUBCUTANEOUS PRN
Status: DISCONTINUED | OUTPATIENT
Start: 2024-07-07 | End: 2024-07-07 | Stop reason: HOSPADM

## 2024-07-07 RX ORDER — SODIUM CHLORIDE 0.9 % (FLUSH) 0.9 %
5-40 SYRINGE (ML) INJECTION PRN
Status: DISCONTINUED | OUTPATIENT
Start: 2024-07-07 | End: 2024-07-07 | Stop reason: HOSPADM

## 2024-07-07 RX ORDER — ONDANSETRON 2 MG/ML
4 INJECTION INTRAMUSCULAR; INTRAVENOUS
Status: DISCONTINUED | OUTPATIENT
Start: 2024-07-07 | End: 2024-07-07 | Stop reason: HOSPADM

## 2024-07-07 RX ORDER — DEXAMETHASONE SODIUM PHOSPHATE 4 MG/ML
INJECTION, SOLUTION INTRA-ARTICULAR; INTRALESIONAL; INTRAMUSCULAR; INTRAVENOUS; SOFT TISSUE PRN
Status: DISCONTINUED | OUTPATIENT
Start: 2024-07-07 | End: 2024-07-07 | Stop reason: SDUPTHER

## 2024-07-07 RX ORDER — LABETALOL HYDROCHLORIDE 5 MG/ML
5 INJECTION, SOLUTION INTRAVENOUS EVERY 10 MIN PRN
Status: DISCONTINUED | OUTPATIENT
Start: 2024-07-07 | End: 2024-07-07 | Stop reason: HOSPADM

## 2024-07-07 RX ORDER — DIPHENHYDRAMINE HYDROCHLORIDE 50 MG/ML
12.5 INJECTION INTRAMUSCULAR; INTRAVENOUS
Status: DISCONTINUED | OUTPATIENT
Start: 2024-07-07 | End: 2024-07-07 | Stop reason: HOSPADM

## 2024-07-07 RX ORDER — OXYCODONE HYDROCHLORIDE 5 MG/1
5 TABLET ORAL PRN
Status: DISCONTINUED | OUTPATIENT
Start: 2024-07-07 | End: 2024-07-07 | Stop reason: HOSPADM

## 2024-07-07 RX ORDER — PROPOFOL 10 MG/ML
INJECTION, EMULSION INTRAVENOUS PRN
Status: DISCONTINUED | OUTPATIENT
Start: 2024-07-07 | End: 2024-07-07 | Stop reason: SDUPTHER

## 2024-07-07 RX ADMIN — SODIUM CHLORIDE, SODIUM LACTATE, POTASSIUM CHLORIDE, AND CALCIUM CHLORIDE: .6; .31; .03; .02 INJECTION, SOLUTION INTRAVENOUS at 11:26

## 2024-07-07 RX ADMIN — AZITHROMYCIN MONOHYDRATE 500 MG: 500 INJECTION, POWDER, LYOPHILIZED, FOR SOLUTION INTRAVENOUS at 21:13

## 2024-07-07 RX ADMIN — PROPOFOL 20 MG: 10 INJECTION, EMULSION INTRAVENOUS at 11:50

## 2024-07-07 RX ADMIN — SODIUM CHLORIDE, POTASSIUM CHLORIDE, SODIUM LACTATE AND CALCIUM CHLORIDE: 600; 310; 30; 20 INJECTION, SOLUTION INTRAVENOUS at 09:52

## 2024-07-07 RX ADMIN — DEXAMETHASONE SODIUM PHOSPHATE 10 MG: 4 INJECTION, SOLUTION INTRAMUSCULAR; INTRAVENOUS at 11:26

## 2024-07-07 RX ADMIN — PROPOFOL 20 MG: 10 INJECTION, EMULSION INTRAVENOUS at 11:54

## 2024-07-07 RX ADMIN — PROPOFOL 200 MG: 10 INJECTION, EMULSION INTRAVENOUS at 11:26

## 2024-07-07 RX ADMIN — CEFTRIAXONE SODIUM 1000 MG: 1 INJECTION, POWDER, FOR SOLUTION INTRAMUSCULAR; INTRAVENOUS at 20:27

## 2024-07-07 RX ADMIN — SODIUM CHLORIDE, PRESERVATIVE FREE 10 ML: 5 INJECTION INTRAVENOUS at 20:23

## 2024-07-07 RX ADMIN — ONDANSETRON 4 MG: 2 INJECTION INTRAMUSCULAR; INTRAVENOUS at 11:26

## 2024-07-07 RX ADMIN — SODIUM CHLORIDE, POTASSIUM CHLORIDE, SODIUM LACTATE AND CALCIUM CHLORIDE: 600; 310; 30; 20 INJECTION, SOLUTION INTRAVENOUS at 14:03

## 2024-07-07 RX ADMIN — IRON SUCROSE 200 MG: 20 INJECTION, SOLUTION INTRAVENOUS at 15:32

## 2024-07-07 NOTE — OP NOTE
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    Surgeon(s):  Jag Bourgeois MD    Assistant:   Surgical Assistant: Anny Nieves    Anesthesia: General    Estimated Blood Loss (mL): Minimal    Complications: None    Specimens:   ID Type Source Tests Collected by Time Destination   A : BLADDER TUMOR Tissue Tissue SURGICAL PATHOLOGY Jag Bourgeois MD 7/7/2024 1145        Implants:  * No implants in log *      Drains:   Urinary Catheter 07/07/24 Bermudez (Active)       Findings:  Infection Present At Time Of Surgery (PATOS) (choose all levels that have infection present):  No infection present  Other Findings: Small tumor right dome (1cm), fully resected, large organized clot noted, irrigated free    Detailed Description of Procedure:   Mr. Chirinos was brought the the operative suite. He was given a/an General anesthesia without complication. He was then put in the lithotomy position, and was prepped and draped in the usual sterile fashion. At this point, a 21 Brazilian rigid cystoscope with a 30 degree lens was placed at the urethral meatus and passed down the urethra and into the bladder. There were no strictures noted. The prostate was severely enlarged with trilobar hypertrophy. Once into the bladder, a large organized clot was seen. Therefore, a 26 Thai resectoscope sheath was placed into the bladder, and the [place]ur[place]ick evacuator was used to evacuate the clot. Once this was completed, repeat cystoscopy demonstrated no active bleeding. There was noted a 1cm tumor in the right dome, which was fully resected. The remainder of the bladder was normal, with the exception of 3-4+ trabeculation. Therefore, an 18 Thai catheter was

## 2024-07-07 NOTE — PLAN OF CARE
Problem: Discharge Planning  Goal: Discharge to home or other facility with appropriate resources  7/7/2024 0117 by Denisha Billingsley RN  Outcome: Progressing  7/6/2024 1426 by Delvin Francis RN  Outcome: Progressing  Flowsheets (Taken 7/6/2024 0800)  Discharge to home or other facility with appropriate resources:   Identify barriers to discharge with patient and caregiver   Identify discharge learning needs (meds, wound care, etc)   Arrange for needed discharge resources and transportation as appropriate   Arrange for interpreters to assist at discharge as needed   Refer to discharge planning if patient needs post-hospital services based on physician order or complex needs related to functional status, cognitive ability or social support system     Problem: Safety - Adult  Goal: Free from fall injury  7/7/2024 0117 by Denisha Billingsley RN  Outcome: Progressing  7/6/2024 1426 by Delvin Francis RN  Outcome: Progressing     Problem: Skin/Tissue Integrity  Goal: Absence of new skin breakdown  Description: 1.  Monitor for areas of redness and/or skin breakdown  2.  Assess vascular access sites hourly  3.  Every 4-6 hours minimum:  Change oxygen saturation probe site  4.  Every 4-6 hours:  If on nasal continuous positive airway pressure, respiratory therapy assess nares and determine need for appliance change or resting period.  7/7/2024 0117 by Denisha Billingsley RN  Outcome: Progressing  7/6/2024 1426 by Delvin Francis, RN  Outcome: Progressing     Problem: ABCDS Injury Assessment  Goal: Absence of physical injury  7/7/2024 0117 by Denisha Billingsley, RN  Outcome: Progressing  7/6/2024 1426 by Delvin Francis, RN  Outcome: Progressing

## 2024-07-08 ENCOUNTER — APPOINTMENT (OUTPATIENT)
Age: 89
DRG: 988 | End: 2024-07-08
Attending: INTERNAL MEDICINE
Payer: MEDICARE

## 2024-07-08 ENCOUNTER — TELEPHONE (OUTPATIENT)
Dept: CARDIAC CATH/INVASIVE PROCEDURES | Age: 89
End: 2024-07-08

## 2024-07-08 ENCOUNTER — ANCILLARY PROCEDURE (OUTPATIENT)
Dept: CARDIOLOGY CLINIC | Age: 89
End: 2024-07-08
Payer: MEDICARE

## 2024-07-08 DIAGNOSIS — I48.0 PAROXYSMAL A-FIB (HCC): ICD-10-CM

## 2024-07-08 PROBLEM — R31.0 GROSS HEMATURIA: Status: ACTIVE | Noted: 2024-07-08

## 2024-07-08 LAB
ANION GAP SERPL CALCULATED.3IONS-SCNC: 6 MMOL/L (ref 3–16)
BASOPHILS # BLD: 0 K/UL (ref 0–0.2)
BASOPHILS NFR BLD: 0.2 %
BUN SERPL-MCNC: 16 MG/DL (ref 7–20)
CALCIUM SERPL-MCNC: 8.2 MG/DL (ref 8.3–10.6)
CHLORIDE SERPL-SCNC: 108 MMOL/L (ref 99–110)
CO2 SERPL-SCNC: 21 MMOL/L (ref 21–32)
CREAT SERPL-MCNC: 1.4 MG/DL (ref 0.8–1.3)
DEPRECATED RDW RBC AUTO: 18.5 % (ref 12.4–15.4)
ECHO AO ASC DIAM: 3.3 CM
ECHO AO ASCENDING AORTA INDEX: 1.61 CM/M2
ECHO AO ROOT DIAM: 3.3 CM
ECHO AO ROOT INDEX: 1.61 CM/M2
ECHO AR MAX VEL PISA: 3.2 M/S
ECHO AV AREA PEAK VELOCITY: 3 CM2
ECHO AV AREA VTI: 2.6 CM2
ECHO AV AREA/BSA PEAK VELOCITY: 1.5 CM2/M2
ECHO AV AREA/BSA VTI: 1.3 CM2/M2
ECHO AV MEAN GRADIENT: 10 MMHG
ECHO AV MEAN VELOCITY: 1.5 M/S
ECHO AV PEAK GRADIENT: 16 MMHG
ECHO AV PEAK VELOCITY: 2 M/S
ECHO AV REGURGITANT PHT: 729 MS
ECHO AV VELOCITY RATIO: 0.7
ECHO AV VTI: 45.2 CM
ECHO BSA: 2.07 M2
ECHO LA AREA 2C: 27.1 CM2
ECHO LA AREA 4C: 20.8 CM2
ECHO LA DIAMETER INDEX: 2.2 CM/M2
ECHO LA DIAMETER: 4.5 CM
ECHO LA MAJOR AXIS: 6.8 CM
ECHO LA MINOR AXIS: 6.5 CM
ECHO LA TO AORTIC ROOT RATIO: 1.36
ECHO LA VOL BP: 66 ML (ref 18–58)
ECHO LA VOL MOD A2C: 86 ML (ref 18–58)
ECHO LA VOL MOD A4C: 54 ML (ref 18–58)
ECHO LA VOL/BSA BIPLANE: 32 ML/M2 (ref 16–34)
ECHO LA VOLUME INDEX MOD A2C: 42 ML/M2 (ref 16–34)
ECHO LA VOLUME INDEX MOD A4C: 26 ML/M2 (ref 16–34)
ECHO LV FRACTIONAL SHORTENING: 27 % (ref 28–44)
ECHO LV INTERNAL DIMENSION DIASTOLE INDEX: 3.22 CM/M2
ECHO LV INTERNAL DIMENSION DIASTOLIC: 6.6 CM (ref 4.2–5.9)
ECHO LV INTERNAL DIMENSION SYSTOLIC INDEX: 2.34 CM/M2
ECHO LV INTERNAL DIMENSION SYSTOLIC: 4.8 CM
ECHO LV IVSD: 1.2 CM (ref 0.6–1)
ECHO LV MASS 2D: 309.3 G (ref 88–224)
ECHO LV MASS INDEX 2D: 150.9 G/M2 (ref 49–115)
ECHO LV POSTERIOR WALL DIASTOLIC: 0.9 CM (ref 0.6–1)
ECHO LV RELATIVE WALL THICKNESS RATIO: 0.27
ECHO LVOT AREA: 4.5 CM2
ECHO LVOT AV VTI INDEX: 0.58
ECHO LVOT DIAM: 2.4 CM
ECHO LVOT MEAN GRADIENT: 4 MMHG
ECHO LVOT PEAK GRADIENT: 7 MMHG
ECHO LVOT PEAK VELOCITY: 1.4 M/S
ECHO LVOT STROKE VOLUME INDEX: 57.8 ML/M2
ECHO LVOT SV: 118.5 ML
ECHO LVOT VTI: 26.2 CM
ECHO MV A VELOCITY: 0.97 M/S
ECHO MV E DECELERATION TIME (DT): 169 MS
ECHO MV E VELOCITY: 0.85 M/S
ECHO MV E/A RATIO: 0.88
ECHO RA AREA 4C: 15.9 CM2
ECHO RA END SYSTOLIC VOLUME APICAL 4 CHAMBER INDEX BSA: 20 ML/M2
ECHO RA VOLUME: 40 ML
ECHO RV TAPSE: 2.4 CM (ref 1.7–?)
ECHO TV REGURGITANT MAX VELOCITY: 3.54 M/S
ECHO TV REGURGITANT PEAK GRADIENT: 50 MMHG
EOSINOPHIL # BLD: 0 K/UL (ref 0–0.6)
EOSINOPHIL NFR BLD: 0 %
GFR SERPLBLD CREATININE-BSD FMLA CKD-EPI: 48 ML/MIN/{1.73_M2}
GLUCOSE SERPL-MCNC: 134 MG/DL (ref 70–99)
HCT VFR BLD AUTO: 27.9 % (ref 40.5–52.5)
HCT VFR BLD AUTO: 28.8 % (ref 40.5–52.5)
HCT VFR BLD AUTO: 28.8 % (ref 40.5–52.5)
HCT VFR BLD AUTO: 29.8 % (ref 40.5–52.5)
HGB BLD-MCNC: 8.8 G/DL (ref 13.5–17.5)
HGB BLD-MCNC: 9.2 G/DL (ref 13.5–17.5)
HGB BLD-MCNC: 9.3 G/DL (ref 13.5–17.5)
HGB BLD-MCNC: 9.4 G/DL (ref 13.5–17.5)
LYMPHOCYTES # BLD: 0.5 K/UL (ref 1–5.1)
LYMPHOCYTES NFR BLD: 5.5 %
MAGNESIUM SERPL-MCNC: 1.7 MG/DL (ref 1.8–2.4)
MCH RBC QN AUTO: 27.3 PG (ref 26–34)
MCHC RBC AUTO-ENTMCNC: 32.2 G/DL (ref 31–36)
MCV RBC AUTO: 84.9 FL (ref 80–100)
MONOCYTES # BLD: 0.4 K/UL (ref 0–1.3)
MONOCYTES NFR BLD: 4.8 %
NEUTROPHILS # BLD: 7.9 K/UL (ref 1.7–7.7)
NEUTROPHILS NFR BLD: 89.5 %
PLATELET # BLD AUTO: 245 K/UL (ref 135–450)
PMV BLD AUTO: 8 FL (ref 5–10.5)
POTASSIUM SERPL-SCNC: 4.3 MMOL/L (ref 3.5–5.1)
RBC # BLD AUTO: 3.4 M/UL (ref 4.2–5.9)
SODIUM SERPL-SCNC: 135 MMOL/L (ref 136–145)
WBC # BLD AUTO: 8.9 K/UL (ref 4–11)

## 2024-07-08 PROCEDURE — 93306 TTE W/DOPPLER COMPLETE: CPT

## 2024-07-08 PROCEDURE — 6370000000 HC RX 637 (ALT 250 FOR IP): Performed by: NURSE PRACTITIONER

## 2024-07-08 PROCEDURE — 85025 COMPLETE CBC W/AUTO DIFF WBC: CPT

## 2024-07-08 PROCEDURE — 6370000000 HC RX 637 (ALT 250 FOR IP): Performed by: INTERNAL MEDICINE

## 2024-07-08 PROCEDURE — 6360000002 HC RX W HCPCS: Performed by: INTERNAL MEDICINE

## 2024-07-08 PROCEDURE — 80048 BASIC METABOLIC PNL TOTAL CA: CPT

## 2024-07-08 PROCEDURE — 6360000002 HC RX W HCPCS: Performed by: NURSE PRACTITIONER

## 2024-07-08 PROCEDURE — 6360000002 HC RX W HCPCS

## 2024-07-08 PROCEDURE — 93306 TTE W/DOPPLER COMPLETE: CPT | Performed by: INTERNAL MEDICINE

## 2024-07-08 PROCEDURE — 83735 ASSAY OF MAGNESIUM: CPT

## 2024-07-08 PROCEDURE — 36415 COLL VENOUS BLD VENIPUNCTURE: CPT

## 2024-07-08 PROCEDURE — 85018 HEMOGLOBIN: CPT

## 2024-07-08 PROCEDURE — 97116 GAIT TRAINING THERAPY: CPT

## 2024-07-08 PROCEDURE — 85014 HEMATOCRIT: CPT

## 2024-07-08 PROCEDURE — 2580000003 HC RX 258: Performed by: NURSE PRACTITIONER

## 2024-07-08 PROCEDURE — 2580000003 HC RX 258: Performed by: INTERNAL MEDICINE

## 2024-07-08 PROCEDURE — 97530 THERAPEUTIC ACTIVITIES: CPT

## 2024-07-08 PROCEDURE — 94669 MECHANICAL CHEST WALL OSCILL: CPT

## 2024-07-08 PROCEDURE — 93005 ELECTROCARDIOGRAM TRACING: CPT

## 2024-07-08 PROCEDURE — 51798 US URINE CAPACITY MEASURE: CPT

## 2024-07-08 PROCEDURE — 6370000000 HC RX 637 (ALT 250 FOR IP)

## 2024-07-08 PROCEDURE — 94640 AIRWAY INHALATION TREATMENT: CPT

## 2024-07-08 PROCEDURE — 2060000000 HC ICU INTERMEDIATE R&B

## 2024-07-08 RX ORDER — MAGNESIUM SULFATE IN WATER 40 MG/ML
2000 INJECTION, SOLUTION INTRAVENOUS ONCE
Status: COMPLETED | OUTPATIENT
Start: 2024-07-08 | End: 2024-07-08

## 2024-07-08 RX ORDER — GUAIFENESIN 600 MG/1
600 TABLET, EXTENDED RELEASE ORAL 2 TIMES DAILY
Status: DISCONTINUED | OUTPATIENT
Start: 2024-07-08 | End: 2024-07-09 | Stop reason: HOSPADM

## 2024-07-08 RX ADMIN — IRON SUCROSE 200 MG: 20 INJECTION, SOLUTION INTRAVENOUS at 14:40

## 2024-07-08 RX ADMIN — SODIUM CHLORIDE, PRESERVATIVE FREE 10 ML: 5 INJECTION INTRAVENOUS at 20:38

## 2024-07-08 RX ADMIN — IPRATROPIUM BROMIDE AND ALBUTEROL SULFATE 1 DOSE: 2.5; .5 SOLUTION RESPIRATORY (INHALATION) at 12:06

## 2024-07-08 RX ADMIN — SODIUM CHLORIDE, PRESERVATIVE FREE 10 ML: 5 INJECTION INTRAVENOUS at 09:38

## 2024-07-08 RX ADMIN — PANTOPRAZOLE SODIUM 40 MG: 40 TABLET, DELAYED RELEASE ORAL at 05:22

## 2024-07-08 RX ADMIN — SODIUM CHLORIDE: 9 INJECTION, SOLUTION INTRAVENOUS at 16:15

## 2024-07-08 RX ADMIN — GUAIFENESIN 600 MG: 600 TABLET ORAL at 14:38

## 2024-07-08 RX ADMIN — ATORVASTATIN CALCIUM 20 MG: 10 TABLET, FILM COATED ORAL at 09:37

## 2024-07-08 RX ADMIN — MAGNESIUM SULFATE HEPTAHYDRATE 2000 MG: 40 INJECTION, SOLUTION INTRAVENOUS at 16:15

## 2024-07-08 RX ADMIN — TAMSULOSIN HYDROCHLORIDE 0.4 MG: 0.4 CAPSULE ORAL at 09:37

## 2024-07-08 RX ADMIN — SODIUM CHLORIDE, POTASSIUM CHLORIDE, SODIUM LACTATE AND CALCIUM CHLORIDE: 600; 310; 30; 20 INJECTION, SOLUTION INTRAVENOUS at 02:34

## 2024-07-08 RX ADMIN — GUAIFENESIN 600 MG: 600 TABLET ORAL at 20:35

## 2024-07-08 RX ADMIN — CEFTRIAXONE SODIUM 1000 MG: 1 INJECTION, POWDER, FOR SOLUTION INTRAMUSCULAR; INTRAVENOUS at 20:36

## 2024-07-08 ASSESSMENT — PAIN SCALES - GENERAL
PAINLEVEL_OUTOF10: 0

## 2024-07-08 NOTE — TELEPHONE ENCOUNTER
When the patient is called for the OV with EDMUNDO TINEO.  Please also scheduled patient to see cards CNP.  Thank You.

## 2024-07-08 NOTE — DISCHARGE INSTRUCTIONS
[employer] MONITOR PATCH PATIENT INSTRUCTIONS    Remove Patch in 1 week from application.  Recalibrate and apply next patch.  Call  [employer] CUSTOMER SUPPORT: [phone] and they will assist if needed.    o PIN: [id number]    o Keep the phone with you as much as possible. Be sure to charge the phone overnight and when the battery gets low. If the phone dies completely, be sure to restart the phone and enter the PIN number to confirm the patch is connected to your phone.    o If you are away from the phone for more than 8 hours, please stay within a 30 foot range of the phone for 3 hours to ensure your data fully uploads.    o If you go somewhere with no cellphone service, still keep the phone with you and charged. Your data will upload when you reach cellphone service. You can stay outside of cellphone range for up to 10 days.    o This phone cannot make calls, take pictures, etc. It is solely for medical use and will only Bluetooth connect to your patch.    · Patch Instructions:    o Wait to exercise or shower for 30 minutes after application.    o Shower with the water stream to your back and avoid putting the patch directly under the water stream.    o Do not bathe, swim or fully submerge the patch.    o If the patch starts to lift off after showering or sweating, dry the patch with a towel and allow the adhesive to dry. It should re-adhere to your skin. If it continues to lift, use the adhesive overlay to re-adhere the patch. Video instructions for the adhesive overlay are on the phone under the Menu Tab in the right hand corner - “Help- [employer] Adhesive Overlay”    o One patch lasts for up to 7 days. If you need to wear an additional patch or replace a patch, utilize the included application instructions or the video instructions - “Menu - Help - [employer] Application”    · End of Wear - Returning the Phone    o You are responsible for returning the phone. You will be financially responsible for an 
Myalgia Monitoring: I explained this is common when taking isotretinoin. If this worsens they will contact us.
Hypercholesterolemia Monitoring: I explained this is common when taking isotretinoin. We will monitor closely.
Lower Range (In Mg/Kg): 120
Include Validation In Note: Yes
Counseling Text: I reviewed the side effect in detail. Patient should get monthly blood tests, not donate blood, not drive at night if vision affected, and not share medication.
Dosing Month 2 (Required For Cumulative Dosing): 30mg BID
Cheilitis Aggressive Treatment: I recommended application of Vaseline or Aquaphor numerous times a day (as often as every hour) and before going to bed. I also prescribed a topical steroid for twice daily use.
Female Completion Statement: After discussing her treatment course we decided to discontinue isotretinoin therapy at this time. I explained that she would need to continue her birth control methods for at least one month after the last dosage. She should also get a pregnancy test one month after the last dose. She shouldn't donate blood for one month after the last dose. She should call with any new symptoms of depression.
Xerosis Aggressive Treatment: I recommended application of Cetaphil or CeraVe numerous times a day and before going to bed to all dry areas. I also prescribed a topical steroid for twice daily use.
Months Of Therapy Completed: 2
Hypertriglyceridemia Treatment: I explained this is common when taking isotretinoin. If this worsens they will contact us. They may try OTC ibuprofen.
Retinoid Dermatitis Aggressive Treatment: I recommended more frequent application of Cetaphil or CeraVe to the areas of dermatitis. I also prescribed a topical steroid for twice daily use until the dermatitis resolves.
Headache Monitoring: I recommended monitoring the headaches for now. There is no evidence of increased intracranial pressure. They were instructed to call if the headaches are worsening.
Detail Level: Zone
Xerosis Aggressive Treatment: I recommended application of Cetaphil or CeraVe numerous times a day going to bed to all dry areas. I also prescribed a topical steroid for twice daily use.
Xerosis Normal Treatment: I recommended application of Cetaphil or CeraVe numerous times a day going to bed to all dry areas.
Pounds Preamble Statement (Weight Entered In Details Tab): Reported Weight in pounds:
Nosebleeds Normal Treatment: I explained this is common when taking isotretinoin. I recommended saline mist in each nostril multiple times a day. If this worsens they will contact us.
Cheilitis Normal Treatment: I recommended application of Vaseline or Aquaphor numerous times a day (as often as every hour) and before going to bed.
Target Cumulative Dosage (In Mg/Kg): 135
Xerosis Normal Treatment: I recommended application of Cetaphil or CeraVe numerous times a day and before going to bed to all dry areas.
Dosing Month 1 (Required For Cumulative Dosing): 40mg Daily
Upper Range (In Mg/Kg): 150
Use Enhanced Counseling Feature (Automatic): No
Use Therapeutic Ranged Or Therapeutic Target: please select Range or Target
Female Pregnancy Counseling Text: Female patients should also be on two forms of birth control while taking this medication and for one month after their last dose.
Weight Units: pounds
Is Xerosis Present?: Yes - Normal Treatment
What Is The Patient's Gender: Female
Male Completion Statement: After discussing his treatment course we decided to discontinue isotretinoin therapy at this time. He shouldn't donate blood for one month after the last dose. He should call with any new symptoms of depression.
Retinoid Dermatitis Normal Treatment: I recommended more frequent application of Cetaphil or CeraVe to the areas of dermatitis.
Next Month's Dosage: Continue Current Dosage
Kilograms Preamble Statement (Weight Entered In Details Tab): Reported Weight in kilograms:
Most Recent Beta Hcg (Optional): 2.4

## 2024-07-08 NOTE — TELEPHONE ENCOUNTER
Dr. Torres is requesting the patient be seen by EP new patient for a fib, EF 30-35% and wearing 2 week VC monitor.      Patient also needs a CNP appointment in 4 weeks [facility] s/p VC monitor.  Thank you.

## 2024-07-08 NOTE — TELEPHONE ENCOUNTER
Monitor company [employer]  Length of monitor 14 days  Monitor ordered by Dr. Tyler Torres   Patch ID [id number]  Device number [id number]  Monitor given to Radha Kunz RN.   Nurse to apply at the time of discharge.

## 2024-07-08 NOTE — PLAN OF CARE
Problem: Discharge Planning  Goal: Discharge to home or other facility with appropriate resources  7/8/2024 1424 by Swetha Field RN  Outcome: Progressing  7/8/2024 0141 by Denisha Billingsley RN  Outcome: Progressing     Problem: Safety - Adult  Goal: Free from fall injury  7/8/2024 1424 by Swetha Field RN  Outcome: Progressing  7/8/2024 0141 by Denisha Billingsley RN  Outcome: Progressing     Problem: Skin/Tissue Integrity  Goal: Absence of new skin breakdown  Description: 1.  Monitor for areas of redness and/or skin breakdown  2.  Assess vascular access sites hourly  3.  Every 4-6 hours minimum:  Change oxygen saturation probe site  4.  Every 4-6 hours:  If on nasal continuous positive airway pressure, respiratory therapy assess nares and determine need for appliance change or resting period.  7/8/2024 1424 by Swetha Field RN  Outcome: Progressing  7/8/2024 0141 by Denisha Billingsley RN  Outcome: Progressing     Problem: ABCDS Injury Assessment  Goal: Absence of physical injury  7/8/2024 1424 by Swetha Field RN  Outcome: Progressing  7/8/2024 0141 by Denisha Billingsley RN  Outcome: Progressing

## 2024-07-09 VITALS
HEIGHT: 70 IN | TEMPERATURE: 97.7 F | DIASTOLIC BLOOD PRESSURE: 61 MMHG | BODY MASS INDEX: 27.49 KG/M2 | HEART RATE: 107 BPM | OXYGEN SATURATION: 93 % | WEIGHT: 192 LBS | SYSTOLIC BLOOD PRESSURE: 120 MMHG | RESPIRATION RATE: 20 BRPM

## 2024-07-09 LAB
BACTERIA BLD CULT ORG #2: NORMAL
BACTERIA BLD CULT: NORMAL
HCT VFR BLD AUTO: 27.3 % (ref 40.5–52.5)
HCT VFR BLD AUTO: 28.9 % (ref 40.5–52.5)
HGB BLD-MCNC: 8.7 G/DL (ref 13.5–17.5)
HGB BLD-MCNC: 9.2 G/DL (ref 13.5–17.5)
MAGNESIUM SERPL-MCNC: 2.1 MG/DL (ref 1.8–2.4)

## 2024-07-09 PROCEDURE — 97530 THERAPEUTIC ACTIVITIES: CPT

## 2024-07-09 PROCEDURE — 97535 SELF CARE MNGMENT TRAINING: CPT

## 2024-07-09 PROCEDURE — 2580000003 HC RX 258: Performed by: INTERNAL MEDICINE

## 2024-07-09 PROCEDURE — 6370000000 HC RX 637 (ALT 250 FOR IP)

## 2024-07-09 PROCEDURE — 6370000000 HC RX 637 (ALT 250 FOR IP): Performed by: NURSE PRACTITIONER

## 2024-07-09 PROCEDURE — 85018 HEMOGLOBIN: CPT

## 2024-07-09 PROCEDURE — 97116 GAIT TRAINING THERAPY: CPT

## 2024-07-09 PROCEDURE — 36415 COLL VENOUS BLD VENIPUNCTURE: CPT

## 2024-07-09 PROCEDURE — 85014 HEMATOCRIT: CPT

## 2024-07-09 PROCEDURE — 83735 ASSAY OF MAGNESIUM: CPT

## 2024-07-09 RX ORDER — GUAIFENESIN 600 MG/1
600 TABLET, EXTENDED RELEASE ORAL 2 TIMES DAILY
Qty: 28 TABLET | Refills: 0 | Status: SHIPPED | OUTPATIENT
Start: 2024-07-09

## 2024-07-09 RX ORDER — CEFDINIR 300 MG/1
300 CAPSULE ORAL 2 TIMES DAILY
Qty: 14 CAPSULE | Refills: 0 | Status: SHIPPED | OUTPATIENT
Start: 2024-07-09 | End: 2024-07-16

## 2024-07-09 RX ADMIN — SODIUM CHLORIDE, PRESERVATIVE FREE 10 ML: 5 INJECTION INTRAVENOUS at 08:22

## 2024-07-09 RX ADMIN — TAMSULOSIN HYDROCHLORIDE 0.4 MG: 0.4 CAPSULE ORAL at 08:22

## 2024-07-09 RX ADMIN — ATORVASTATIN CALCIUM 20 MG: 10 TABLET, FILM COATED ORAL at 08:22

## 2024-07-09 RX ADMIN — GUAIFENESIN 600 MG: 600 TABLET ORAL at 08:22

## 2024-07-09 RX ADMIN — PANTOPRAZOLE SODIUM 40 MG: 40 TABLET, DELAYED RELEASE ORAL at 08:22

## 2024-07-09 NOTE — CARE COORDINATION
CASE MANAGEMENT DISCHARGE SUMMARY      Discharge to: home with [facility]       IMM given: (date) 7/9/24  Follow-Up copy of Important Message from Medicare (IMM2) has been explained to patient and/or designated healthcare decision maker (HCDM). Pt and/or HCDM aware that patient is permitted to stay an additional 4 hours prior to discharge to consider an appeal if they feel as though they are being discharged too soon. Patient may discharge as planned if chooses to do so.  Patient/HCDM voice no other concerns or questions regarding this process.      Transportation: private - wife        Confirmed discharge plan with:patient/wife/RN/Ritika with [facility]      Patient: yes     Family:  yes        RN, name: Joselyn     Note: Discharging nurse to complete ABHI, reconcile AVS, and place final copy with patient's discharge packet.   
No  Plans to Return to Present Housing: (P) Yes  Other Identified Issues/Barriers to RETURNING to current housing: none noted  Potential Assistance needed at discharge: (P) Home Care            Potential DME:    Patient expects to discharge to: House  Plan for transportation at discharge: (P) Self    Financial    Payor: [facility] MEDICARE / Plan: [facility] MEDICARE ADVANTAGE / Product Type: *No Product type* /     Does insurance require precert for SNF: Yes    Potential assistance Purchasing Medications: (P) No  Meds-to-Beds request:      No Pharmacies Listed    Notes:    Factors facilitating achievement of predicted outcomes: Family support, Motivated, Cooperative, Pleasant, Sense of humor, and Has needed Durable Medical Equipment at home    Barriers to discharge: Medical complications    Additional Case Management Notes: Referred to patient for d/c planning.  Spoke to patient.  Patient is an 89 year old male admitted for a fib.  Patient lives at home with SO.  Patient reports he is independent in ADLs.  Patient currently denies d/c needs. Per PT/OT recommend home with 24/7 and HC.  Per patient SO is able to provide.  Patient with no preference of HC agency.  Agreeable to [facility].   CM to follow.     The Plan for Transition of Care is related to the following treatment goals of Atrial fibrillation with RVR (HCC) [I48.91]  Paroxysmal A-fib (HCC) [I48.0]  Abdominal wall seroma, initial encounter [S30.1XXA]    IF APPLICABLE: The Patient and/or patient representative Mani and his family were provided with a choice of provider and agrees with the discharge plan. Freedom of choice list with basic dialogue that supports the patient's individualized plan of care/goals and shares the quality data associated with the providers was provided to:     Patient Representative Name:       The Patient and/or Patient Representative Agree with the Discharge Plan?      JOSE Wiggins, JESSS   Case Management Department  Ph: [phone]

## 2024-07-09 NOTE — PLAN OF CARE
Problem: Discharge Planning  Goal: Discharge to home or other facility with appropriate resources  7/8/2024 2143 by Janet Calix RN  Outcome: Progressing  7/8/2024 1424 by Swetha Field RN  Outcome: Progressing     Problem: Safety - Adult  Goal: Free from fall injury  7/8/2024 2143 by Janet Calix RN  Outcome: Progressing  7/8/2024 1424 by Swetha Field RN  Outcome: Progressing     Problem: Skin/Tissue Integrity  Goal: Absence of new skin breakdown  Description: 1.  Monitor for areas of redness and/or skin breakdown  2.  Assess vascular access sites hourly  3.  Every 4-6 hours minimum:  Change oxygen saturation probe site  4.  Every 4-6 hours:  If on nasal continuous positive airway pressure, respiratory therapy assess nares and determine need for appliance change or resting period.  7/8/2024 2143 by Janet Calix RN  Outcome: Progressing  7/8/2024 1424 by Swetha Field RN  Outcome: Progressing     Problem: ABCDS Injury Assessment  Goal: Absence of physical injury  7/8/2024 2143 by Janet Calix RN  Outcome: Progressing  7/8/2024 1424 by Swetha Field RN  Outcome: Progressing     Problem: Pain  Goal: Verbalizes/displays adequate comfort level or baseline comfort level  Outcome: Progressing

## 2024-07-09 NOTE — DISCHARGE SUMMARY
results for input(s): \"AST\", \"ALT\", \"BILIDIR\", \"BILITOT\", \"ALKPHOS\" in the last 72 hours.  No results for input(s): \"INR\", \"LACTA\", \"TSH\" in the last 72 hours.    Urine Cultures:   Lab Results   Component Value Date/Time    LABURIN No growth at 18 to 36 hours 02/23/2023 08:56 AM     Blood Cultures:   Lab Results   Component Value Date/Time    BC  07/05/2024 09:25 PM     No Growth to date.  Any change in status will be called.     Lab Results   Component Value Date/Time    BLOODCULT2  07/05/2024 09:25 PM     No Growth to date.  Any change in status will be called.     Organism: No results found for: \"ORG\"    Signed:    Krystal Traylor APRN - CNP

## 2024-07-09 NOTE — TELEPHONE ENCOUNTER
7/9- pt is currently inpatient. Pt needs a 4W [facility] s/p VC monitor scheduled with CNP and also new pt appt with EP for afib next available.

## 2024-07-09 NOTE — PROGRESS NOTES
Pt Name: Mani Chirinos  Medical Record Number: [id number]  Date of Birth 1935   Today's Date: 7/8/2024      Subjective:  Awake in bed, eating breakfast. No complaints.     ROS: Constitutional: No fever    Vitals:  Vitals:    07/08/24 0345 07/08/24 0513 07/08/24 0830 07/08/24 0858   BP: 133/67  124/68 124/68   Pulse: 94  65    Resp: 18  20    Temp: 97.5 °F (36.4 °C)  97.7 °F (36.5 °C)    TempSrc: Oral  Oral    SpO2: 93%  95%    Weight:  75.9 kg (167 lb 6.4 oz)  87.1 kg (192 lb)   Height:    1.778 m (5' 10\")     I/O last 3 completed shifts:  In: 2604.3 [P.O.:360; I.V.:2142; IV Piggyback:102.3]  Out: 1485 [Urine:1475; Blood:10]    Exam:  General: Awake, oriented, no acute distress  Respiratory: Nonlabored breathing  Abdomen: Soft, non-tender, non-distended, no masses  : friedman catheter intact with yellow output  Skin: Skin color, texture, turgor normal, no rashes or lesions  Neurologic: no gross deficits    CURRENT MEDICATIONS   Scheduled Meds:   iron sucrose (VENOFER) 200 mg in sodium chloride 0.9 % 100 mL IVPB  200 mg IntraVENous Q24H    atorvastatin  20 mg Oral Daily    pantoprazole  40 mg Oral QAM AC    tamsulosin  0.4 mg Oral Daily    sodium chloride flush  5-40 mL IntraVENous 2 times per day    [Held by provider] enoxaparin  40 mg SubCUTAneous Daily    cefTRIAXone (ROCEPHIN) IV  1,000 mg IntraVENous Q24H     Continuous Infusions:   lactated ringers IV soln 100 mL/hr at 07/08/24 0234    sodium chloride Stopped (07/05/24 2231)     PRN Meds:.perflutren lipid microspheres, sodium chloride flush, sodium chloride, ondansetron **OR** ondansetron, polyethylene glycol, acetaminophen **OR** acetaminophen, ipratropium 0.5 mg-albuterol 2.5 mg    LABS     Recent Labs     07/05/24  1756 07/06/24  0524 07/06/24  1629 07/08/24  0013 07/08/24  0456 07/08/24  0820   WBC 7.6 6.7  --   --  8.9  --    HGB 9.3* 8.3*   < > 9.4* 9.3* 9.2*   HCT 28.2* 25.7*   < > 29.8* 28.8* 28.8*    175  --   --  245  --    NA 
    Pt Name: Mani Chirinos  Medical Record Number: [id number]  Date of Birth 1935   Today's Date: 7/9/2024      Subjective:  Awake in bed. No complaints.     ROS: Constitutional: No fever    Vitals:  Vitals:    07/08/24 2033 07/08/24 2346 07/09/24 0315 07/09/24 0745   BP: 118/74 111/64 (!) 127/91 120/61   Pulse: 96 94 82 82   Resp: 20 20 20 20   Temp: 98.4 °F (36.9 °C) 98.1 °F (36.7 °C) 97.5 °F (36.4 °C) 97.7 °F (36.5 °C)   TempSrc: Oral Oral Oral Oral   SpO2: 91% 95% 94% 95%   Weight:       Height:         I/O last 3 completed shifts:  In: 240 [P.O.:240]  Out: 1750 [Urine:1750]    Exam:  General: Awake, oriented, no acute distress  Respiratory: Nonlabored breathing  Abdomen: Soft, appropriately-tender, non-distended, no masses, Incision with mild serosanguinous fluid from site  : spontaneously voiding per purewick, yellow output  Skin: Skin color, texture, turgor normal, no rashes or lesions  Neurologic: no gross deficits    CURRENT MEDICATIONS   Scheduled Meds:   guaiFENesin  600 mg Oral BID    atorvastatin  20 mg Oral Daily    pantoprazole  40 mg Oral QAM AC    tamsulosin  0.4 mg Oral Daily    sodium chloride flush  5-40 mL IntraVENous 2 times per day    [Held by provider] enoxaparin  40 mg SubCUTAneous Daily    cefTRIAXone (ROCEPHIN) IV  1,000 mg IntraVENous Q24H     Continuous Infusions:   sodium chloride 10 mL/hr at 07/08/24 1615     PRN Meds:.perflutren lipid microspheres, sodium chloride flush, sodium chloride, ondansetron **OR** ondansetron, polyethylene glycol, acetaminophen **OR** acetaminophen, ipratropium 0.5 mg-albuterol 2.5 mg    LABS     Recent Labs     07/08/24  0456 07/08/24  0820 07/08/24  1558 07/09/24  0013 07/09/24  0456   WBC 8.9  --   --   --   --    HGB 9.3*   < > 8.8* 8.7* 9.2*   HCT 28.8*   < > 27.9* 27.3* 28.9*     --   --   --   --    *  --   --   --   --    K 4.3  --   --   --   --      --   --   --   --    CO2 21  --   --   --   --    BUN 16  --   --   
   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, status post right nephroureterectomy; currently feeling somewhat poorly; CT scan with large clot in the bladder; discussed cystoscopy and clot evacuation later today; NPO until procedure    Meds: see med rec    Family History, Social History, Review of Systems:  Reviewed and agreed to as per chart    Exam:    Vitals:  /78   Pulse 95   Temp 97.7 °F (36.5 °C) (Oral)   Resp 20   Ht 1.778 m (5' 10\")   Wt 87.1 kg (192 lb)   SpO2 97%   BMI 27.55 kg/m²   Temp  Av.9 °F (36.6 °C)  Min: 97.5 °F (36.4 °C)  Max: 98.4 °F (36.9 °C)    Intake/Output Summary (Last 24 hours) at 2024 0826  Last data filed at 2024 0105  Gross per 24 hour   Intake 2949.12 ml   Output 625 ml   Net 2324.12 ml       Labs:  WBC:    Lab Results   Component Value Date/Time    WBC 6.7 2024 05:24 AM     Hemoglobin/Hematocrit:    Lab Results   Component Value Date/Time    HGB 8.6 2024 01:35 AM    HCT 26.9 2024 01:35 AM     BMP:    Lab Results   Component Value Date/Time     2024 05:24 AM    K 3.5 2024 05:24 AM     2024 05:24 AM    CO2 20 2024 05:24 AM    BUN 13 2024 05:24 AM    CREATININE 1.4 2024 05:24 AM    CALCIUM 7.9 2024 05:24 AM    GFRAA >60 10/25/2016 06:31 AM    LABGLOM 48 2024 05:24 AM    LABGLOM 32 2023 08:39 AM     PT/INR:    Lab Results   Component Value Date/Time    PROTIME 16.0 2024 05:56 PM    INR 1.26 2024 05:56 PM     PTT:  No results found for: \"APTT\"[APTT    Imaging:  CT scan reviewed in detail    Impression/Plan:     Plan for cystoscopy and clot evacuation later today    DELL OSORIO MD    
  Hospital Medicine Progress Note      Date of Admission: 7/5/2024  Hospital Day: 2    Chief Admission Complaint:      Post-op Problem (Pt arrived via EMS from private residence. Pt reports last Friday had right kidney removed. Pt reports following a coughing spell this evening, sutures on abdomen tore open. Per EMS bleeding has been minimal during transport. )        Subjective:      Patient is returning from imaging this morning. He is legally blind and we help into bed. He states he continues to have bloody urine and some SOB. He is AFIB per telemetry and remains on Cardizem gtt.     Presenting Admission History:       This is an 89-year-old male who was brought to the ED via ambulance for having progressively worsening shortness of breath     Patient initially complained of having postoperative seroma that went incisional area which has ruptured.  Advised to seek  Evaluation in the emergency room patient incision seems to be clean and dry and no drainage is noticed.     While evaluated in the emergency room patient noted to be in A-fib with RVR and he was initiated with IV diltiazem push and IV diltiazem drip.  Patient's heart rate gradually decreased     Patient is also found to be having pneumonia on chest x-ray which was noticed by PCP earlier this morning and patient was started on antibiotic therapy  Patient has been having poor appetite decreased p.o. intake recently     Past medical history significant for urothelial carcinoma bladder, carcinoma of kidney status post laparoscopic nephroureterectomy on 6/20/2024, Hypertension     The workup in the emergency room notable for:     Laboratory workup significant for hyponatremia 135  Creatinine of 1.5 which is decreasing  Hypomagnesemia 1.60 hypocalcemia 8.2  Hypokalemia mild 3.5  Hemoglobin 9.3/28.2  Urinalysis pending       Assessment/Plan:      Current Principal Problem:  Paroxysmal A-fib (HCC)    AFIB with RVR possibly 2/2 to anemia and recent surgical 
  Hospital Medicine Progress Note      Date of Admission: 7/5/2024  Hospital Day: 3    Chief Admission Complaint:      Post-op Problem (Pt arrived via EMS from private residence. Pt reports last Friday had right kidney removed. Pt reports following a coughing spell this evening, sutures on abdomen tore open. Per EMS bleeding has been minimal during transport. )        Subjective:      Patient just returned from OR for cystoscopy with hematoma clot removal. He is feeling well. Light red urine noted on friedman cathter.   He is legally blind. Granddaughter and wife at bedside    Presenting Admission History:       This is an 89-year-old male who was brought to the ED via ambulance for having progressively worsening shortness of breath     Patient initially complained of having postoperative seroma that went incisional area which has ruptured.  Advised to seek  Evaluation in the emergency room patient incision seems to be clean and dry and no drainage is noticed.     While evaluated in the emergency room patient noted to be in A-fib with RVR and he was initiated with IV diltiazem push and IV diltiazem drip.  Patient's heart rate gradually decreased     Patient is also found to be having pneumonia on chest x-ray which was noticed by PCP earlier this morning and patient was started on antibiotic therapy  Patient has been having poor appetite decreased p.o. intake recently     Past medical history significant for urothelial carcinoma bladder, carcinoma of kidney status post laparoscopic nephroureterectomy on 6/20/2024, Hypertension     The workup in the emergency room notable for:     Laboratory workup significant for hyponatremia 135  Creatinine of 1.5 which is decreasing  Hypomagnesemia 1.60 hypocalcemia 8.2  Hypokalemia mild 3.5  Hemoglobin 9.3/28.2  Urinalysis pending       Assessment/Plan:      Current Principal Problem:  Paroxysmal A-fib (HCC)    AFIB with RVR possibly 2/2 to anemia and recent surgical 
  Hospital Medicine Progress Note      Date of Admission: 7/5/2024  Hospital Day: 4    Chief Admission Complaint:  shortness of breath    Subjective:  denies dizziness, n/v, chest pain, palpitations.  Reports PALMA, but denies dyspnea at rest.  Occasional cough    Presenting Admission History: \"89-year-old male who was brought to the ED via ambulance for having progressively worsening shortness of breath  Patient initially complained of having postoperative seroma that went incisional area which has ruptured.  Advised.  Evaluation in the emergency room patient incision seems to be clean and dry and no drainage is noticed.  While evaluated in the emergency room patient noted to be in A-fib with RVR and he was initiated with IV diltiazem push and IV diltiazem drip.  Patient's heart rate gradually decreased  Patient is also found to be having pneumonia on chest x-ray which was noticed by PCP earlier this morning and patient was started on antibiotic therapy  Patient has been having poor appetite decreased p.o. intake recently  Past medical history significant for urothelial carcinoma bladder, carcinoma of kidney status post laparoscopic nephroureterectomy on 6/20/2024, Hypertension\"    Assessment/Plan:      Current Principal Problem:  Gross hematuria    History of urothelial bladder and kidney cancer.  Underwent R nephroureterectomy on 6/25 with Dr. Bourgeois.  Last seen by him on 7/2, and hematuria was present at that time.  Per review of notes, hematuria has been ongoing for 3-4 months.  Hgb 9.3 on arrival, currently stable at 9.2.  Urology consulted, appreciate recommendations.  Underwent cysto 7/7 with Dr. Bourgeois: 1cm tumor in R dome (resected); large clot (evacuated); friedman placed.  Catheter to be removed today w/ voiding trial to follow, per urology note.  Follow up with Dr. Bourgeois Friday as already scheduled    Anemia.  Hgb 9.3 on arrival, currently stable at 9.2.  Iron studies this admission: iron 20, iron sat 
4 Eyes Skin Assessment     NAME:  Mani Chirinos  YOB: 1935  MEDICAL RECORD NUMBER:  [id number]    The patient is being assessed for  Admission    I agree that at least one RN has performed a thorough Head to Toe Skin Assessment on the patient. ALL assessment sites listed below have been assessed.      Areas assessed by both nurses:Sherlyn&Mariella RICH    Head, Face, Ears, Shoulders, Back, Chest, Arms, Elbows, Hands, Sacrum. Buttock, Coccyx, Ischium, and Legs. Feet and Heels        Does the Patient have a Wound? Yes wound(s) were present on assessment. LDA wound assessment was Initiated and completed by RN       Esvin Prevention initiated by RN: Yes  Wound Care Orders initiated by RN: Yes    Pressure Injury (Stage 3,4, Unstageable, DTI, NWPT, and Complex wounds) if present, place Wound referral order by RN under : No    New Ostomies, if present place, Ostomy referral order under : No     Nurse 1 eSignature: Electronically signed by Sherlyn Horvath RN on 7/5/24 at 10:19 PM EDT    **SHARE this note so that the co-signing nurse can place an eSignature**    Nurse 2 eSignature: {Esignature:[id number]}   
Bermudez removed at 1000 per MD order  
Occupational Therapy  Facility/Department: [facility] A2 CARD TELEMETRY  Occupational Therapy Initial Assessment and Treatment Note     Name: Mani Chirinos  : 1935  MRN: [id number]  Date of Service: 2024    Discharge Recommendations:  24 hour supervision or assist, Home with Home health OT     Patient Diagnosis(es): The primary encounter diagnosis was Atrial fibrillation with RVR (HCC). A diagnosis of Abdominal wall seroma, initial encounter was also pertinent to this visit.  Past Medical History:  has a past medical history of Acid reflux, Asthenia pigmentosa (HCC), BPH (benign prostatic hyperplasia), Hyperlipidemia, Hypertension, Irregular heart beat, Kidney stones, Legal blindness, and Retinitis.  Past Surgical History:  has a past surgical history that includes hernia repair (Right); Cholecystectomy; back surgery; TURP; eye surgery (Bilateral); and other surgical history (N/A, 10/14/2016).     Assessment   Performance deficits / Impairments: Decreased functional mobility ;Decreased ADL status;Decreased endurance;Decreased balance  After evaluation, pt found to be presenting with the above mentioned occupational performance deficits which are affecting participation in daily living skills. Pt would benefit from continued skilled occupational therapy to address ADLs, functional mobility, and safety while in acute care.    Prognosis: Good  Decision Making: Medium Complexity  REQUIRES OT FOLLOW-UP: Yes  Activity Tolerance  Activity Tolerance: Patient Tolerated treatment well  Activity Tolerance Comments: SOB with activity though O2 sats 96% RA        Plan   Occupational Therapy Plan  Times Per Week: 2-3x  Current Treatment Recommendations: Functional mobility training, Balance training, Endurance training, Safety education & training, Self-Care / ADL, Equipment evaluation, education, & procurement, Patient/Caregiver education & training 
Patient admitted to room 209 from ED.  Patient oriented to room, call light, bed rails, phone, lights and bathroom.  Patient instructed about the schedule of the day including: vital sign frequency, lab draws, possible tests, frequency of MD and staff rounds, including RN/MD rounding together at bedside, daily weights, and I &O's.  Patient instructed about prescribed diet, and television.  Bed alarm in place, patient aware of placement and reason.   Telemetry box in place, patient aware of placement and reason.  Bed locked, in lowest position, side rails up 2/4, call light within reach.  Will continue to monitor.     
Patient arrived to PACU bay 9, phase one initiated. Placed on bedside monitor, VSS. Report obtained from OR RN and anesthesia. Patient on O2 via nasal cannula at 4L. See flowsheets for assessment.  Warm blankets applied. Side rails in place, will monitor patient closely.   
Patient discharged from A2. Event monitor placed on patient and educated patient and wife on workings of device. Wife verbalized understanding and that their children would help them with questions. Showed them customer service number as well. Patient picked up prescriptions at [facility]. IV and tele removed. Patient's incision bleeding and redressed. New and appropriate ab binder placed on patient.Supplies given to patient for wound care changes. Patient left with all belongings.    
Patient returned from surgery. Alert and oriented. Family at bedside. Has friedman catheter with pink tinged urine draining. No pain except at penis from friedman catheter. Tolerating ice chips well.   
Patient to surgery via bed with family at bedside.   
Patient with order for cardiology consult. Spoke with Rosalio with cardiology to verify patient to be seen today. Rosalio stated that per Dr. Torres, pt does not need to be seen again and is ok to just discharge with an event monitor. Spoke with Layton ALEJANDRE to get update on plan for pt. Per Layton, pt to finish magnesium replacement this evening, recheck tomorrow, and hopefully discharge tomorrow with event monitor. No need for further cardiac workup at this time.   
Patient's dressing to midline abdomen saturated with sanguinous fluid. Changed abd pads, taped dressing, covered with abdominal binder.   
Physical Therapy  Facility/Department: [facility] A2 CARD TELEMETRY  Physical Therapy Initial Assessment    Name: Mani Chirinos  : 1935  MRN: [id number]  Date of Service: 2024    Discharge Recommendations:  24 hour supervision or assist, Home with Home health PT   PT Equipment Recommendations  Equipment Needed: No  Other: pt has rollator      Patient Diagnosis(es): The primary encounter diagnosis was Atrial fibrillation with RVR (HCC). A diagnosis of Abdominal wall seroma, initial encounter was also pertinent to this visit.  Past Medical History:  has a past medical history of Acid reflux, Asthenia pigmentosa (HCC), BPH (benign prostatic hyperplasia), Hyperlipidemia, Hypertension, Irregular heart beat, Kidney stones, Legal blindness, and Retinitis.  Past Surgical History:  has a past surgical history that includes hernia repair (Right); Cholecystectomy; back surgery; TURP; eye surgery (Bilateral); and other surgical history (N/A, 10/14/2016).    Assessment   Body Structures, Functions, Activity Limitations Requiring Skilled Therapeutic Intervention: Decreased functional mobility ;Decreased body mechanics;Decreased strength;Decreased endurance;Decreased balance;Decreased vision/visual deficit  Assessment: Pt is awake and agreeable to therapy session. Pt is limited by vision impairment (baseline) and decreased activity tolerance. He requires contact guard assistance with transfers and ambulation in room d/t unfamiliar environment, and demos increased work of breathing following walk to/from bathroom. HR increased to 118, recovers with seated rest break. Recommend home PT upon discharge to progress towards prior level of function and address remaining mobility deficits.  Therapy Prognosis: Good  Decision Making: Medium Complexity  Requires PT Follow-Up: Yes       Plan   Physical Therapy Plan  General Plan: 3-5 times per week  Current Treatment Recommendations: Strengthening, Balance training, Functional 
Physical Therapy  Facility/Department: [facility] A2 CARD TELEMETRY  Daily Treatment Note  NAME: Mani Chirinos  : 1935  MRN: [id number]    Date of Service: 2024    Discharge Recommendations:  24 hour supervision or assist, Home with Home health PT   PT Equipment Recommendations  Equipment Needed: No  Other: pt has rollator    Patient Diagnosis(es): The primary encounter diagnosis was Atrial fibrillation with RVR (HCC). Diagnoses of Abdominal wall seroma, initial encounter, Atrial fibrillation, unspecified type (HCC), Blood clot in bladder, and Paroxysmal A-fib (HCC) were also pertinent to this visit.    Assessment   Assessment: Pt grossly CGA/SBA for transfers and ambulation with use of RW. Assist for walker secondary to blindness. Will continue to progress mobility as pt tolerates. Recommend home with 24 hr supervision and home PT.  Activity Tolerance: Patient tolerated treatment well;Patient limited by fatigue  Equipment Needed: No  Other: pt has rollator     Plan    Physical Therapy Plan  General Plan: 3-5 times per week  Current Treatment Recommendations: Strengthening;Balance training;Functional mobility training;Transfer training;Gait training;Stair training;Therapeutic activities;Safety education & training     Restrictions  Restrictions/Precautions  Restrictions/Precautions: Fall Risk, Up as Tolerated  Position Activity Restriction  Other position/activity restrictions: IV, abdominal incision     Subjective    Subjective  Subjective: Pt agreeable to therapy. Reports he wants to go home.  Pain: denies pain  Orientation  Overall Orientation Status: Within Normal Limits  Orientation Level: Oriented X4  Cognition  Overall Cognitive Status: WNL     Objective      Bed Mobility Training  Bed Mobility Training: Yes  Interventions: Safety awareness training  Supine to Sit: Supervision  Sit to Supine: Contact-guard assistance;Stand-by assistance;Additional time;Adaptive equipment  Scooting: 
Physical Therapy  Facility/Department: [facility] A2 CARD TELEMETRY  Daily Treatment Note  NAME: Mani Chirinos  : 1935  MRN: [id number]    Date of Service: 2024    Discharge Recommendations:  24 hour supervision or assist, Home with Home health PT   PT Equipment Recommendations  Equipment Needed: No  Other: pt has rollator    Patient Diagnosis(es): The primary encounter diagnosis was Atrial fibrillation with RVR (HCC). Diagnoses of Abdominal wall seroma, initial encounter, Atrial fibrillation, unspecified type (HCC), Blood clot in bladder, and Paroxysmal A-fib (HCC) were also pertinent to this visit.    Assessment   Assessment: Pt sesson limited by PALMA and bleeding from abdomen dressing.  Pt required CGA for bed moibilty, transfers and amb until that point due to blindness and trying to navigate in an unfamiliar  Equipment Needed: No  Other: pt has rollator     Plan    Physical Therapy Plan  General Plan: 3-5 times per week  Current Treatment Recommendations: Strengthening;Balance training;Functional mobility training;Transfer training;Gait training;Stair training;Therapeutic activities;Safety education & training     Restrictions  Restrictions/Precautions  Restrictions/Precautions: Fall Risk, Up as Tolerated  Position Activity Restriction  Other position/activity restrictions: IV, abdominal incision     Subjective    Subjective  Subjective: Pt pleasantand agreeable, has been coughing but feels better now  Pain: denies pain at rest  Orientation  Overall Orientation Status: Within Normal Limits  Orientation Level: Oriented X4  Cognition  Overall Cognitive Status: WNL     Objective   Vitals  Vitals:    24    BP: 120/61   Pulse: 82   Resp:    Temp:    SpO2: 95%          Bed Mobility Training  Bed Mobility Training: Yes  Interventions: Safety awareness training  Supine to Sit: Contact-guard assistance;Stand-by assistance;Additional time;Adaptive equipment  Sit to Supine: Contact-guard 
Pt on remote tele per [facility] as noted pt had 9 beat run of Vtach. Monitor strip from [facility] now on pt's chart. Pt w/o c/o chest pain or SOB, VSS. Will continue to monitor.   
Pt to Ct scan   
RT called for HHN for SOB.  Patient states he is SOB \"because of anemia.\"  He is also in A Fib at this time.  Breath sounds are clear, SpO2 97% on room air.  Patient offered HHN but declined because he has been coughing all night and is worried he will cough more after Duoneb.  
Wife to see patient and signed consent form for surgery. Patient aware also.  
room    Subjective   Subjective  Subjective: Pt agreeable to OT session this date  Pain: denies pain  Orientation  Overall Orientation Status: Within Normal Limits  Orientation Level: Oriented X4  Pain: denies pain  Cognition  Overall Cognitive Status: WNL        Objective    Vitals     Vitals:    07/09/24 1113   BP:    Pulse: (!) 107   Resp:    Temp:    SpO2: 93%       Bed Mobility Training  Bed Mobility Training: Yes  Interventions: Safety awareness training  Supine to Sit: Supervision  Sit to Supine: Contact-guard assistance;Stand-by assistance;Additional time;Adaptive equipment  Scooting: Supervision  Balance  Sitting: Intact  Standing: Impaired  Standing - Static: Good  Standing - Dynamic: Fair  Transfer Training  Transfer Training: Yes  Overall Level of Assistance: Stand-by assistance  Sit to Stand: Stand-by assistance  Stand to Sit: Stand-by assistance  Toilet Transfer: Contact-guard assistance  Gait  Gait Training: Yes  Overall Level of Assistance: Contact-guard assistance  Distance (ft): 30 Feet  Assistive Device: Gait belt;Walker, rolling  Interventions: Safety awareness training;Verbal cues;Manual cues  Base of Support: Narrowed  Speed/Mya: Pace decreased (< 100 feet/min)  Step Length: Right shortened;Left shortened  Gait Abnormalities:  (assist with steering walker and 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 Pt able to pull up once at knees        Safety Devices  Type of Devices: All fall risk precautions in place;Call light within reach;Gait belt;Nurse notified;Left in chair;Chair alarm in place  Restraints  Restraints Initially in Place: No     Patient Education  Education Given To: Patient  Education Provided: Role of Therapy;Transfer

## 2024-07-09 NOTE — TELEPHONE ENCOUNTER
7/9- called pt @[phone] [facility] informing pt to call back. Pt needs a 4W hsfu s/p VC monitor scheduled with CNP and also new pt appt with [facility] for afib 8/20/2024 @3:30PM. Date/time per RNEW.

## 2024-07-09 NOTE — TELEPHONE ENCOUNTER
Cannot schedule new patients with [facility] (email from PM Brenda regarding this for reference). First overbook appointment is PAGE 8/20/2024 @ 3:30PM

## 2024-07-09 NOTE — DISCHARGE INSTR - COC
{Prognosis:0877430344}    Condition at Discharge: { Patient Condition:701204834}    Rehab Potential (if transferring to Rehab): {Prognosis:3614657659}    Recommended Labs or Other Treatments After Discharge: ***  Wound care:  Clean incision and tape qasim on bilateral lateral abdomin with normal saline. Apply dry dressing, abd and paper tape to abd staple line daily and change as needed for soilage.  R & L later abdomin, apply light dusting of stoma powder, lightly rub in, dab barrier wipe to affected areas until it goes from white to clear twice a day.  Abdominal binder to support incision line. Notify surgeon for excessive abd drainage or signs of infection. Follow up with Dr Bourgeois, call for appt    Physician Certification: I certify the above information and transfer of Mani Chirinos  is necessary for the continuing treatment of the diagnosis listed and that he requires {Admit to Appropriate Level of Care:13930} for {GREATER/LESS:200595055} 30 days.     Update Admission H&P: {CHP DME Changes in HandP:191367625}    PHYSICIAN SIGNATURE:  {Esignature:[id number]}

## 2024-07-09 NOTE — PLAN OF CARE
Problem: Discharge Planning  Goal: Discharge to home or other facility with appropriate resources  Outcome: Completed     Problem: Safety - Adult  Goal: Free from fall injury  Outcome: Completed     Problem: Skin/Tissue Integrity  Goal: Absence of new skin breakdown  Description: 1.  Monitor for areas of redness and/or skin breakdown  2.  Assess vascular access sites hourly  3.  Every 4-6 hours minimum:  Change oxygen saturation probe site  4.  Every 4-6 hours:  If on nasal continuous positive airway pressure, respiratory therapy assess nares and determine need for appliance change or resting period.  Outcome: Completed     Problem: ABCDS Injury Assessment  Goal: Absence of physical injury  Outcome: Completed     Problem: Pain  Goal: Verbalizes/displays adequate comfort level or baseline comfort level  Outcome: Completed

## 2024-07-09 NOTE — TELEPHONE ENCOUNTER
October is next available with Dr. Puga which is too far out.  Can the patient be scheduled with Dr. Adams or can a sooner time be found.  Thank You.

## 2024-07-09 NOTE — CONSULTS
Urology Attending Consult Note      Reason for Consultation: hematuria, recent right nephroureterectomy     History: 89 yr old male admitted with progressive sob  with hx urothelial cell carcinoma right kidney and hx bladder cancer who underwent right nephroureterectomy  at [facility] by Dr Bourgeois. Patient states had prbc transfusion postop. Patient reports chronic hematuria over past 3-4 months and also states has been draining fluid from midline incision last several days. Patient denies pain    Family History, Social History, Review of Systems:  Reviewed and agreed to as per chart    Vitals:  BP (!) 99/55   Pulse 73   Temp 97.7 °F (36.5 °C)   Resp 18   Ht 1.778 m (5' 10\")   Wt 87.1 kg (192 lb)   SpO2 96%   BMI 27.55 kg/m²   Temp  Av.4 °F (36.9 °C)  Min: 97.7 °F (36.5 °C)  Max: 100 °F (37.8 °C)    Intake/Output Summary (Last 24 hours) at 2024 0944  Last data filed at 2024 0402  Gross per 24 hour   Intake 348.11 ml   Output 280 ml   Net 68.11 ml         Physical:  Well developed, well nourished in no acute distress  Mood indicates no abnormalities. Pt doesn’t appear depressed  Orientated to time and place  Neck is supple, trachea is midline  Respiratory effort is normal  Cardiovascular show no extremity swelling  Abdomen no masses or hernias are palpated, there is no tenderness. Liver and Spleen appear normal.  Midline incision with serosanguinous drainage with staples intact       Labs:  WBC:    Lab Results   Component Value Date/Time    WBC 6.7 2024 05:24 AM     Hemoglobin/Hematocrit:    Lab Results   Component Value Date/Time    HGB 8.3 2024 05:24 AM    HCT 25.7 2024 05:24 AM     BMP:    Lab Results   Component Value Date/Time     2024 05:24 AM    K 3.5 2024 05:24 AM     2024 05:24 AM    CO2 20 2024 05:24 AM    BUN 13 2024 05:24 AM    CREATININE 1.4 2024 05:24 AM    CALCIUM 7.9 2024 05:24 AM    GFRAA 
Advance Care Planning     Palliative Team Advance Care Planning (ACP) Conversation    Date of Conversation: 07/09/24    Individuals present for the conversation: Patient with decision making capacity     ACP documents on file prior to discussion:  -Power of  for Healthcare  -Living Will    Previously completed document/s not on file: N/A     Healthcare Decision Maker:    Primary Decision Maker: Coral Chirinos HCPOA - Domestic Partner - [phone]     Conversation Summary:  Pt eager for d/c and agreeable to having HHC.  In light of pts advanced age and co-morbidities, recurrent admissions seem likely and community palcare may benefit he and his care giver.   Will f/u w/ Coral and offer the involvement of palcare.    Resuscitation Status: Full code    Documentation Completed:  -No new documents completed.        I spent 40 minutes with the patient and/or surrogate decision maker discussing the patient's wishes and goals.                                                                                                                                                                                                                                              Tami Garcia RN        
Consult Placed     Who: Palliative care  Date:7/6/24  Time:0902     Electronically signed by Rome Stevens RN on 7/6/2024 at 9:02 AM     
Consult Placed     Who: Urology group  Date:7/6/24  Time:0907     Electronically signed by Rome Stevens RN on 7/6/2024 at 9:07 AM     
weakness        Labs   CBC:   Lab Results   Component Value Date/Time    WBC 6.7 07/06/2024 05:24 AM    RBC 3.02 07/06/2024 05:24 AM    HGB 8.3 07/06/2024 05:24 AM    HCT 25.7 07/06/2024 05:24 AM    MCV 85.1 07/06/2024 05:24 AM    RDW 19.3 07/06/2024 05:24 AM     07/06/2024 05:24 AM     CMP:  Lab Results   Component Value Date/Time     07/06/2024 05:24 AM    K 3.5 07/06/2024 05:24 AM     07/06/2024 05:24 AM    CO2 20 07/06/2024 05:24 AM    BUN 13 07/06/2024 05:24 AM    CREATININE 1.4 07/06/2024 05:24 AM    GFRAA >60 10/25/2016 06:31 AM    AGRATIO 1.2 07/06/2024 05:24 AM    LABGLOM 48 07/06/2024 05:24 AM    LABGLOM 32 02/23/2023 08:39 AM    GLUCOSE 99 07/06/2024 05:24 AM    CALCIUM 7.9 07/06/2024 05:24 AM    BILITOT 0.6 07/06/2024 05:24 AM    ALKPHOS 58 07/06/2024 05:24 AM    AST 13 07/06/2024 05:24 AM    ALT 15 07/06/2024 05:24 AM     PT/INR:  No results found for: \"PTINR\"  HgBA1c:No results found for: \"LABA1C\"  No results found for: \"CKTOTAL\", \"CKMB\", \"CKMBINDEX\", \"TROPONINI\"      Cardiac Data     Last EKG:   EKG today shows normal sinus rhythm with PVCs, EKG report from [facility] in June 2024 showed normal sinus rhythm multiform ventricular PVCs        Studies:     Cxr    IMPRESSION:  Right lower lung airspace disease is suspicious for pneumonia.         I have reviewed labs and imaging/xray/diagnostic testing in this note.    Assessment and Plan          Patient Active Problem List   Diagnosis    Hx of decompressive lumbar laminectomy    Lumbar stenosis with neurogenic claudication    Spondylolisthesis, lumbar region  L45    Chronic bilateral low back pain with sciatica    Lumbar spondylosis with myelopathy    Angioedema    Allergic reaction    Paroxysmal A-fib (HCC)       Possible paroxysmal atrial fibrillation, this was noted on telemetry emergency room, there are no tracings available for review, current rhythm is sinus with PVCs which has been noted on prior studies at [facility] 
N/A  Wound Pain Timing/Severity: none  Premedicated: No    Plan   Plan of Care: Wound 07/09/24 Abdomen Lateral;Lower;Right-Dressing/Treatment: Other (comment), Barrier film (crust with stoma powder and barrier film)  Wound 07/09/24 Abdomen Lateral;Left;Lower-Dressing/Treatment: Other (comment), Barrier film (crust with stoma powder and barrier film)    Text to Krystal Traylor Nurse practitioner:  Tape burns bilateral flanks.  Treat with stoma powder and barrier film.  ABD incision is draining serosanguinous fluid.  Daily dressing changes. Orders placed and ABHI updated for discharge.    Recommend:  Clean incision and tape qasim on bilateral lateral abdomin with normal saline. Apply dry dressing, abd and paper tape to abd staple line daily and change as needed for soilage.  R & L later abdomin, apply light dusting of stoma powder, lightly rub in, dab barrier wipe to affected areas twice a day.   Notify surgeon for excessive abd drainage or signs of infection. Abd binder to support abd incision line    Spoke to bed side RN and informed of orders.  Requested a larger ABD binder be placed on patient prior to discharge.     Supplies gather and to be sent home with patient:  Normal saline, abd pads, stoma powder, barrier wipe, scissors, paper tape.    Ready for discharge per wound care,    Specialty Bed Required : Yes   [] Low Air Loss   [x] Pressure Redistribution Isoflex gel therapy pressure redistribution mattress in place.   [] Fluid Immersion  [] Bariatric  [] Total Pressure Relief  [] Other:     Current Diet: ADULT DIET; Regular; Low Fat/Low Chol/High Fiber/2 gm Na  Dietician consult:  No    Discharge Plan:  Placement for patient upon discharge: skilled nursing    Patient appropriate for Outpatient Wound Care Center: Yes    Referrals:  [x]  / discharge planner plan for home care  [x] Home Health Care  [] Supplies  [] Other    Patient/Caregiver Teaching: instructed on treatment and that supplies left in

## 2024-07-10 LAB
EKG DIAGNOSIS: NORMAL
EKG Q-T INTERVAL: 356 MS
EKG QRS DURATION: 90 MS
EKG QTC CALCULATION (BAZETT): 481 MS
EKG R AXIS: 15 DEGREES
EKG T AXIS: 33 DEGREES
EKG VENTRICULAR RATE: 110 BPM

## 2024-07-10 PROCEDURE — 93010 ELECTROCARDIOGRAM REPORT: CPT | Performed by: INTERNAL MEDICINE

## 2024-07-12 ENCOUNTER — HOSPITAL ENCOUNTER (OUTPATIENT)
Dept: CT IMAGING | Age: 89
Discharge: HOME | End: 2024-07-12
Payer: MEDICARE

## 2024-07-12 DIAGNOSIS — D49.511 NEOPLASM OF RIGHT KIDNEY: ICD-10-CM

## 2024-07-12 PROCEDURE — 74176 CT ABD & PELVIS W/O CONTRAST: CPT

## 2024-07-26 ENCOUNTER — TELEPHONE (OUTPATIENT)
Dept: CARDIOLOGY CLINIC | Age: 89
End: 2024-07-26

## 2024-07-26 NOTE — TELEPHONE ENCOUNTER
Melyssa, pt's family member, contacted office stating the family and patient are concerned with him proceeding with the 8/9/24 cystoscopy procedure. Melyssa stated pt's pcp thinks pt might have CHF/pneumonia. They started the pt on an antibiotic which he is currently taking. The family is requesting for cardiology to review pt's most recent bloodwork. The pt complains of coughing with phlegm. The pt stated swelling on body has gone down. Pt denies feeling of \"drowning\" while lying flat, and denies shortness of breath. Please advise.      I do not see a [facility] form in pt's chart with Melyssa listed.

## 2024-07-29 NOTE — TELEPHONE ENCOUNTER
I called pt at [phone], informed that I could not call back Melyssa as she is not listed on any communication forms at this time-informed will need to update that if he chooses. Pt was agreeable to seeing [facility] at [facility] on 7/31/24 at 930a and is scheduled.

## 2024-07-30 LAB — ECHO BSA: 2.07 M2

## 2024-07-30 PROCEDURE — 93228 REMOTE 30 DAY ECG REV/REPORT: CPT | Performed by: INTERNAL MEDICINE

## 2024-07-31 ENCOUNTER — OFFICE VISIT (OUTPATIENT)
Dept: CARDIOLOGY CLINIC | Age: 89
End: 2024-07-31
Payer: MEDICARE

## 2024-07-31 VITALS
BODY MASS INDEX: 27.55 KG/M2 | HEART RATE: 60 BPM | OXYGEN SATURATION: 97 % | HEIGHT: 70 IN | SYSTOLIC BLOOD PRESSURE: 138 MMHG | DIASTOLIC BLOOD PRESSURE: 50 MMHG

## 2024-07-31 DIAGNOSIS — I48.0 PAROXYSMAL A-FIB (HCC): ICD-10-CM

## 2024-07-31 DIAGNOSIS — I50.9 CHRONIC CONGESTIVE HEART FAILURE, UNSPECIFIED HEART FAILURE TYPE (HCC): ICD-10-CM

## 2024-07-31 DIAGNOSIS — I49.3 FREQUENT PVCS: ICD-10-CM

## 2024-07-31 DIAGNOSIS — I50.20 HFREF (HEART FAILURE WITH REDUCED EJECTION FRACTION) (HCC): Primary | ICD-10-CM

## 2024-07-31 DIAGNOSIS — I47.20 VENTRICULAR TACHYCARDIA (HCC): ICD-10-CM

## 2024-07-31 PROCEDURE — 99215 OFFICE O/P EST HI 40 MIN: CPT | Performed by: NURSE PRACTITIONER

## 2024-07-31 PROCEDURE — 93000 ELECTROCARDIOGRAM COMPLETE: CPT | Performed by: NURSE PRACTITIONER

## 2024-07-31 PROCEDURE — 1123F ACP DISCUSS/DSCN MKR DOCD: CPT | Performed by: NURSE PRACTITIONER

## 2024-07-31 RX ORDER — METOPROLOL SUCCINATE 25 MG/1
25 TABLET, EXTENDED RELEASE ORAL DAILY
Qty: 30 TABLET | Refills: 1 | Status: SHIPPED | OUTPATIENT
Start: 2024-08-12

## 2024-07-31 RX ORDER — CEPHALEXIN 500 MG/1
500 CAPSULE ORAL DAILY
COMMUNITY
Start: 2024-07-20

## 2024-07-31 RX ORDER — FUROSEMIDE 20 MG/1
20 TABLET ORAL DAILY
Qty: 30 TABLET | Refills: 3 | Status: SHIPPED | OUTPATIENT
Start: 2024-07-31

## 2024-07-31 RX ORDER — METHYLPREDNISOLONE 4 MG/1
TABLET ORAL
Qty: 1 KIT | Refills: 0 | Status: SHIPPED | OUTPATIENT
Start: 2024-07-31 | End: 2024-08-06

## 2024-07-31 NOTE — PROGRESS NOTES
[facility]  Cardiac Follow-up    Primary Care Doctor:  Zander Ko III, MD    Chief Complaint   Patient presents with    New Patient    Shortness of Breath     cough        History of Present Illness:  I had the pleasure of seeing Mani Chirinos as a new patient to me for urgent visit and a hospital follow up.   PMH urothelial carcinoma bladder, carcinoma of kidney s/p laparoscopic nephroureterectomy 6/2024, HTN, blindness. After kidney removed he had a lot of leg swelling improved with catheter placed.   Recently admitted 7/5/24-7/9/24 with shrotness of breath, possible afib with RVR, treated for PNA, not able to anticoagulated due to hematuria. Echocardiogram 7/8/24 showed LVEF 30-35%. He had 2 week cardiac monitor that showed PSVT, possible afib and has upcoming EP evaluation.   He was treated with levaquin until 7/23/2024  He is having a lot coughing when he gets up. Coughs for 1-2 hours and then improves. No coughing with laying down  No chest pain or pressures   No more hematuria. Continues with catheter.   No shortness of breath.   Wife feels like he is working harder to breath.   Currently on keflex.   Going for cystoscopy 8/9/24    Mani Chirinos describes symptoms including fatigue.     Past Medical History:   has a past medical history of Acid reflux, Asthenia pigmentosa (HCC), BPH (benign prostatic hyperplasia), Hyperlipidemia, Hypertension, Irregular heart beat, Kidney stones, Legal blindness, and Retinitis.  Surgical History:   has a past surgical history that includes hernia repair (Right); Cholecystectomy; back surgery; TURP; eye surgery (Bilateral); other surgical history (N/A, 10/14/2016); and Cystoscopy (N/A, 7/7/2024).   Social History:   reports that he has never smoked. He has never used smokeless tobacco. He reports that he does not drink alcohol and does not use drugs.   Family History:   Family History   Problem Relation Age of Onset    Diabetes Mother

## 2024-07-31 NOTE — PATIENT INSTRUCTIONS
Plan:   Stop the Hydrochlorothiazide (HCTZ)  and start lasix 20mg daily   Repeat BMP next Tuesday  Recommend daily weights   Repeat EKG today  Medrol dose pack   Add low dose toprol 25mg daily for heart failure, afib and NSVT starting on 8/12/2024   Follow up with EP as planned  Follow up with CHF team in 6 weeks     Your provider has ordered lab work for further evaluation. The order/prescription is included in your paper work.     You may have your labs completed at any of the [facility] locations including:    [facility] associated with the [facility] Center located on the [facility]. The address is [address] on the first floor. It is the building directly across from the Emergency Room. The main entrance to this building faces [address]. You will have to drive around the building to locate the main entrance.     You may also use [facility] lab located inside the hospital, [facility] lab located inside the hospital, or [facility] ER located off [address].     You may use any other outlying [facility] Lab facilities or your [facility] Primary Care office.    Labs may also be completed at any other facility of your choice, however, please allow 72 hours to receive your labs for review. If you do not receive your lab results 72-96 hours after completing, please call the office.

## 2024-08-02 ENCOUNTER — TELEPHONE (OUTPATIENT)
Dept: CARDIOLOGY CLINIC | Age: 89
End: 2024-08-02

## 2024-08-02 NOTE — TELEPHONE ENCOUNTER
Anne-Marie from [facility] pre admission testing stated that they need a clearance letter placed in epic for pts colonoscopy that is scheduled with Dr. Bourgeois for 8/9/24. Please advise.

## 2024-08-02 NOTE — TELEPHONE ENCOUNTER
Mani Chirinos, 1935    Cardiac Risk Assessment    What type of procedure are you having?  Colonoscopy     When is your procedure scheduled for?  8/9/24    Medications to be stopped.      What physician is performing your procedure?  Dr José    Phone Number:       Fax number to send the letter:       Cardiologist:   JOHANNA    Last Appointment:   7/31/24    Next Appointment:   9/4/24

## 2024-08-06 ENCOUNTER — HOSPITAL ENCOUNTER (OUTPATIENT)
Age: 89
Discharge: HOME | End: 2024-08-06
Payer: MEDICARE

## 2024-08-06 DIAGNOSIS — I50.9 CHRONIC CONGESTIVE HEART FAILURE, UNSPECIFIED HEART FAILURE TYPE (HCC): ICD-10-CM

## 2024-08-06 DIAGNOSIS — I48.0 PAROXYSMAL A-FIB (HCC): ICD-10-CM

## 2024-08-06 LAB
ANION GAP SERPL CALCULATED.3IONS-SCNC: 14 MMOL/L (ref 3–16)
BUN SERPL-MCNC: 25 MG/DL (ref 7–20)
CALCIUM SERPL-MCNC: 9.8 MG/DL (ref 8.3–10.6)
CHLORIDE SERPL-SCNC: 103 MMOL/L (ref 99–110)
CO2 SERPL-SCNC: 22 MMOL/L (ref 21–32)
CREAT SERPL-MCNC: 1.8 MG/DL (ref 0.8–1.3)
GLUCOSE SERPL-MCNC: 151 MG/DL (ref 70–99)
NT-PROBNP SERPL-MCNC: 5731 PG/ML (ref 0–449)
POTASSIUM SERPL-SCNC: 4 MMOL/L (ref 3.5–5.1)
SODIUM SERPL-SCNC: 139 MMOL/L (ref 136–145)

## 2024-08-06 PROCEDURE — 36415 COLL VENOUS BLD VENIPUNCTURE: CPT

## 2024-08-06 PROCEDURE — 80048 BASIC METABOLIC PNL TOTAL CA: CPT

## 2024-08-06 PROCEDURE — 83880 ASSAY OF NATRIURETIC PEPTIDE: CPT

## 2024-08-06 NOTE — TELEPHONE ENCOUNTER
Okay to proceed with non-cardiac procedure at moderate risk without any additional cardiology work up at this time.     Please get update on how patient is feeling since OV.   Thanks

## 2024-08-07 ENCOUNTER — TELEPHONE (OUTPATIENT)
Dept: CARDIOLOGY CLINIC | Age: 89
End: 2024-08-07

## 2024-08-07 NOTE — TELEPHONE ENCOUNTER
PT daughter Melyssa contacted office requesting to speak w/ nurse in regards to pt condition. Melyssa states pt is always coughing to the point he gets SOB. CB# [phone]

## 2024-08-07 NOTE — TELEPHONE ENCOUNTER
Letter created.     Spoke with patient he states is doing okay, still not quite 100%, still coughing.

## 2024-08-08 DIAGNOSIS — I50.9 ACUTE CONGESTIVE HEART FAILURE, UNSPECIFIED HEART FAILURE TYPE (HCC): Primary | ICD-10-CM

## 2024-08-08 DIAGNOSIS — Z79.899 MEDICATION MANAGEMENT: ICD-10-CM

## 2024-08-08 RX ORDER — FUROSEMIDE 20 MG/1
40 TABLET ORAL DAILY
Qty: 30 TABLET | Refills: 3
Start: 2024-08-08

## 2024-08-08 NOTE — RESULT ENCOUNTER NOTE
Called and spoke with patient. Relayed RB NP results and instructions. He VU. Order will be placed for BMP/BNP. He VU to lasix instructions.

## 2024-08-08 NOTE — TELEPHONE ENCOUNTER
Spoke with patients daughter Nicole and also to patient, the cardiac clearance that was sent was sent for a colonoscopy.   Patients surgery is tomorrow (Friday 08/08/20024) at 4pm. He has to see Dr. Bourgeois at 12 or 1230.   He would like to know about this before his appt.    He is not going to ER- per daughter and patient, his cough is not worse, and he is not having SOB. And the dizziness only happened once.     [facility],  was put in for a colonoscopy, but patient is having CYSTOSCOPY, TRANSURETHRAL RESECTION OF THE PROSTATE with Jag Bourgeois MD on 8/9/2024 at  4:00 PM.

## 2024-08-08 NOTE — TELEPHONE ENCOUNTER
[facility]    Called patients daughter Melyssa back.  - first part of this is that patient has cough, then gets SOB. She said he usually coughs phlegm but hasn't been lately.  No edema, no weight gain. Yesterday had an episode of dizziness.   No other complaints. He is taking Lasix 20mg daily.       Second part- there was a cardiac clearance sent to [facility], which is OOT today. The message was put in for a colonoscopy, but patient is having CYSTOSCOPY, TRANSURETHRAL RESECTION OF THE PROSTATE with Jag Bourgeois MD on 8/9/2024 at  4:00 PM.   I'm not sure if a new cardiac clearance is needed or not.     No other providers have seen patient. Please advise.

## 2024-08-08 NOTE — TELEPHONE ENCOUNTER
"Mother Self Care:    Activity: Avoid strenuous exercise and get adequate rest.  No driving until the physician consent given.  Emotional Changes: Most women find birth to be a time of great emotional upheaval.  Sense of loss, mood swings, fatigue, anxiety, and feeling "let down" are common.  If feelings worsen or last more than a week, call your physician.  Breast Care/Bottle Feeding: Wear support bra 24 hours a day for one week.  Avoid stimulation to breasts.  You may use ice packs for discomfort.  Nery-Care/Vaginal Bleeding: Remember to use your nery-bottle after urinating.  Your flow will change from red, to pink, to yellow/white color over a period of 2 weeks.  Menstruation will return in 3-8 weeks, or longer if breastfeeding.  Episiotomy Vaginal Delivery: Stitches will dissolve within 10 days to 3 weeks.  Warm baths, tucks, and dermoplast will promote healing.  Avoid bubble baths or strong soaps.  Sexual Activity/Pelvic Rest: No sexual activity, tampons, or douching until your physician gives you consent.  Diet: Continue to eat from the five basic food groups, including plenty of protein, fruits, vegetables, and whole grains.  Limit empty calories and high fat foods.  Drink enough fluids to satisfy thirst and add an extra 500 calories for breastfeeding.  Constipation/Hemorrhoids: Drink plenty of water.  You may take a stool softener or natural laxative (Metamucil). You may use tucks or hemorrhoid ointment and soak in a warm tub.    CALL YOUR OB DOCTOR IF ANY OF THE FOLLOWING OCCURS:  *Heavy bleeding - saturating a pad an hour or passing any large (2-3 inches in size) blood clots.  *Any pain, redness, or tenderness in lower leg.  *You cannot care for yourself or your baby.  *Any signs of infection-      - Temperature greater than 100.5 degrees F      - Foul smelling vaginal discharge and/or incisional drainage      - Increased episiotomy or incisional pain      - Hot, hard, red or sore area on breast      - " Patients surgery is for tomorrow, please advise regarding the cardiac clearance.   Flu-like symptoms      - Any urgency, frequency or burning with urination    Return To the Hospital for further Evaluation:  · Headache not relieved by tylenol or ibuprofen  · Blurry vision, double vision, seeing spots, or flashing lights  · Feeling faint or passing out  · Right epigastric pain  · Difficulty breathing  · Swelling in hands, face, or feet  · Any of these symptoms accompanied by nausea/vomiting  · Gaining more than 5 pounds in one week  · Seizures  These symptoms could be an indication of elevated blood pressure.       If you have any questions that need to be answered immediately please call the Labor & Delivery Unit at [phone] and ask to speak to a nurse.

## 2024-08-08 NOTE — TELEPHONE ENCOUNTER
Would be pending ER eval with regard to clearance.  If pt defers ER, lets set up OV w/ np, can also utilize alternate provider if needed to facilitate things. Thank you.

## 2024-08-09 NOTE — TELEPHONE ENCOUNTER
Spoke with Jenni RN, she made me aware NPRB spoke with . NPRB has agreed to add on pt 08/12. Pt has been added to schedule. Pt is aware of appointment time. Per Jenni.  is requiring pt to see EP before proceeding with procedure. Pt has new pt ov with [facility] on 08/20. Pt will keep both ov scheduled at this time

## 2024-08-09 NOTE — TELEPHONE ENCOUNTER
I spoke with anesthesia.   Recommend cancelling procedure today needs to be better optimized prior to undergoing anesthesia.   Please schedule with me Monday - at 1130

## 2024-08-09 NOTE — TELEPHONE ENCOUNTER
Per note from  staff, but was not cleared from anesthesia, .  is requiring further cardiac workup. Called  office to see what they are requiring, they will call back.    Called and spoke with pt and explained we are not the ones who canceled procedure, he v/u

## 2024-08-09 NOTE — TELEPHONE ENCOUNTER
Received call from Melyssa [place] and pt stating they have cancelled pts procedure due to pt needing further cardiac workup but didn't specify to pt what was needed. Pts requesting we call dr mackay's office to see what cardiac workup is required. ERIC calling Dr. Mackays office

## 2024-08-12 ENCOUNTER — APPOINTMENT (OUTPATIENT)
Dept: CT IMAGING | Age: 89
DRG: 291 | End: 2024-08-12
Payer: MEDICARE

## 2024-08-12 ENCOUNTER — ANCILLARY PROCEDURE (OUTPATIENT)
Dept: EMERGENCY DEPT | Age: 89
DRG: 291 | End: 2024-08-12
Attending: EMERGENCY MEDICINE
Payer: MEDICARE

## 2024-08-12 ENCOUNTER — OFFICE VISIT (OUTPATIENT)
Dept: CARDIOLOGY CLINIC | Age: 89
End: 2024-08-12
Payer: MEDICARE

## 2024-08-12 ENCOUNTER — HOSPITAL ENCOUNTER (INPATIENT)
Age: 89
LOS: 5 days | Discharge: HOME | DRG: 291 | End: 2024-08-17
Attending: EMERGENCY MEDICINE | Admitting: INTERNAL MEDICINE
Payer: MEDICARE

## 2024-08-12 VITALS
WEIGHT: 161.8 LBS | HEIGHT: 70 IN | SYSTOLIC BLOOD PRESSURE: 120 MMHG | BODY MASS INDEX: 23.16 KG/M2 | OXYGEN SATURATION: 96 % | HEART RATE: 55 BPM | DIASTOLIC BLOOD PRESSURE: 60 MMHG

## 2024-08-12 DIAGNOSIS — R00.1 BRADYCARDIA: ICD-10-CM

## 2024-08-12 DIAGNOSIS — I48.91 ATRIAL FIBRILLATION, UNSPECIFIED TYPE (HCC): ICD-10-CM

## 2024-08-12 DIAGNOSIS — I49.3 FREQUENT PVCS: ICD-10-CM

## 2024-08-12 DIAGNOSIS — R05.9 COUGH, UNSPECIFIED TYPE: ICD-10-CM

## 2024-08-12 DIAGNOSIS — J18.9 PNEUMONIA DUE TO INFECTIOUS ORGANISM, UNSPECIFIED LATERALITY, UNSPECIFIED PART OF LUNG: ICD-10-CM

## 2024-08-12 DIAGNOSIS — I50.20 HFREF (HEART FAILURE WITH REDUCED EJECTION FRACTION) (HCC): Primary | ICD-10-CM

## 2024-08-12 DIAGNOSIS — I50.21 ACUTE SYSTOLIC CONGESTIVE HEART FAILURE (HCC): ICD-10-CM

## 2024-08-12 DIAGNOSIS — I50.9 ACUTE ON CHRONIC CONGESTIVE HEART FAILURE, UNSPECIFIED HEART FAILURE TYPE (HCC): Primary | ICD-10-CM

## 2024-08-12 DIAGNOSIS — I42.9 CARDIOMYOPATHY, UNSPECIFIED TYPE (HCC): ICD-10-CM

## 2024-08-12 DIAGNOSIS — R06.02 SHORTNESS OF BREATH: ICD-10-CM

## 2024-08-12 PROBLEM — I50.43 CHF (CONGESTIVE HEART FAILURE), NYHA CLASS I, ACUTE ON CHRONIC, COMBINED (HCC): Status: ACTIVE | Noted: 2024-08-12

## 2024-08-12 LAB
ALBUMIN SERPL-MCNC: 4 G/DL (ref 3.4–5)
ALBUMIN/GLOB SERPL: 1.4 {RATIO} (ref 1.1–2.2)
ALP SERPL-CCNC: 93 U/L (ref 40–129)
ALT SERPL-CCNC: 36 U/L (ref 10–40)
ANION GAP SERPL CALCULATED.3IONS-SCNC: 13 MMOL/L (ref 3–16)
ANTI-XA UNFRAC HEPARIN: 0.15 IU/ML (ref 0.3–0.7)
APTT BLD: 29.9 SEC (ref 22.1–36.4)
AST SERPL-CCNC: 23 U/L (ref 15–37)
BASOPHILS # BLD: 0.1 K/UL (ref 0–0.2)
BASOPHILS NFR BLD: 0.6 %
BILIRUB SERPL-MCNC: 0.8 MG/DL (ref 0–1)
BUN SERPL-MCNC: 26 MG/DL (ref 7–20)
CALCIUM SERPL-MCNC: 9.5 MG/DL (ref 8.3–10.6)
CHLORIDE SERPL-SCNC: 102 MMOL/L (ref 99–110)
CO2 SERPL-SCNC: 24 MMOL/L (ref 21–32)
CREAT SERPL-MCNC: 2 MG/DL (ref 0.8–1.3)
DEPRECATED RDW RBC AUTO: 18.1 % (ref 12.4–15.4)
DEPRECATED RDW RBC AUTO: 18.7 % (ref 12.4–15.4)
EOSINOPHIL # BLD: 0.2 K/UL (ref 0–0.6)
EOSINOPHIL NFR BLD: 1.8 %
GFR SERPLBLD CREATININE-BSD FMLA CKD-EPI: 31 ML/MIN/{1.73_M2}
GLUCOSE SERPL-MCNC: 111 MG/DL (ref 70–99)
HCT VFR BLD AUTO: 42 % (ref 40.5–52.5)
HCT VFR BLD AUTO: 43.1 % (ref 40.5–52.5)
HGB BLD-MCNC: 13.4 G/DL (ref 13.5–17.5)
HGB BLD-MCNC: 13.9 G/DL (ref 13.5–17.5)
INR PPP: 1.18 (ref 0.85–1.15)
LYMPHOCYTES # BLD: 0.9 K/UL (ref 1–5.1)
LYMPHOCYTES NFR BLD: 10.2 %
MAGNESIUM SERPL-MCNC: 2 MG/DL (ref 1.8–2.4)
MCH RBC QN AUTO: 27 PG (ref 26–34)
MCH RBC QN AUTO: 27 PG (ref 26–34)
MCHC RBC AUTO-ENTMCNC: 31.9 G/DL (ref 31–36)
MCHC RBC AUTO-ENTMCNC: 32.3 G/DL (ref 31–36)
MCV RBC AUTO: 83.8 FL (ref 80–100)
MCV RBC AUTO: 84.7 FL (ref 80–100)
MONOCYTES # BLD: 0.6 K/UL (ref 0–1.3)
MONOCYTES NFR BLD: 6.8 %
NEUTROPHILS # BLD: 7 K/UL (ref 1.7–7.7)
NEUTROPHILS NFR BLD: 80.6 %
NT-PROBNP SERPL-MCNC: ABNORMAL PG/ML (ref 0–449)
PLATELET # BLD AUTO: 133 K/UL (ref 135–450)
PLATELET # BLD AUTO: 155 K/UL (ref 135–450)
PMV BLD AUTO: 8.1 FL (ref 5–10.5)
PMV BLD AUTO: 8.1 FL (ref 5–10.5)
POTASSIUM SERPL-SCNC: 4.8 MMOL/L (ref 3.5–5.1)
PROT SERPL-MCNC: 6.9 G/DL (ref 6.4–8.2)
PROTHROMBIN TIME: 15.2 SEC (ref 11.9–14.9)
RBC # BLD AUTO: 4.96 M/UL (ref 4.2–5.9)
RBC # BLD AUTO: 5.14 M/UL (ref 4.2–5.9)
SODIUM SERPL-SCNC: 139 MMOL/L (ref 136–145)
TROPONIN, HIGH SENSITIVITY: 58 NG/L (ref 0–22)
TROPONIN, HIGH SENSITIVITY: 59 NG/L (ref 0–22)
TROPONIN, HIGH SENSITIVITY: 61 NG/L (ref 0–22)
TROPONIN, HIGH SENSITIVITY: 62 NG/L (ref 0–22)
TROPONIN, HIGH SENSITIVITY: 64 NG/L (ref 0–22)
WBC # BLD AUTO: 8.4 K/UL (ref 4–11)
WBC # BLD AUTO: 8.7 K/UL (ref 4–11)

## 2024-08-12 PROCEDURE — 83735 ASSAY OF MAGNESIUM: CPT

## 2024-08-12 PROCEDURE — 6360000002 HC RX W HCPCS: Performed by: INTERNAL MEDICINE

## 2024-08-12 PROCEDURE — 99285 EMERGENCY DEPT VISIT HI MDM: CPT

## 2024-08-12 PROCEDURE — 87040 BLOOD CULTURE FOR BACTERIA: CPT

## 2024-08-12 PROCEDURE — 6360000002 HC RX W HCPCS: Performed by: NURSE PRACTITIONER

## 2024-08-12 PROCEDURE — 6370000000 HC RX 637 (ALT 250 FOR IP): Performed by: INTERNAL MEDICINE

## 2024-08-12 PROCEDURE — 99215 OFFICE O/P EST HI 40 MIN: CPT | Performed by: NURSE PRACTITIONER

## 2024-08-12 PROCEDURE — 85520 HEPARIN ASSAY: CPT

## 2024-08-12 PROCEDURE — 93308 TTE F-UP OR LMTD: CPT

## 2024-08-12 PROCEDURE — 71250 CT THORAX DX C-: CPT

## 2024-08-12 PROCEDURE — 93005 ELECTROCARDIOGRAM TRACING: CPT | Performed by: EMERGENCY MEDICINE

## 2024-08-12 PROCEDURE — 76604 US EXAM CHEST: CPT

## 2024-08-12 PROCEDURE — 84484 ASSAY OF TROPONIN QUANT: CPT

## 2024-08-12 PROCEDURE — 2580000003 HC RX 258: Performed by: NURSE PRACTITIONER

## 2024-08-12 PROCEDURE — 84443 ASSAY THYROID STIM HORMONE: CPT

## 2024-08-12 PROCEDURE — 1123F ACP DISCUSS/DSCN MKR DOCD: CPT | Performed by: NURSE PRACTITIONER

## 2024-08-12 PROCEDURE — 80053 COMPREHEN METABOLIC PANEL: CPT

## 2024-08-12 PROCEDURE — 2060000000 HC ICU INTERMEDIATE R&B

## 2024-08-12 PROCEDURE — 85610 PROTHROMBIN TIME: CPT

## 2024-08-12 PROCEDURE — 36415 COLL VENOUS BLD VENIPUNCTURE: CPT

## 2024-08-12 PROCEDURE — 83880 ASSAY OF NATRIURETIC PEPTIDE: CPT

## 2024-08-12 PROCEDURE — 84439 ASSAY OF FREE THYROXINE: CPT

## 2024-08-12 PROCEDURE — 85730 THROMBOPLASTIN TIME PARTIAL: CPT

## 2024-08-12 PROCEDURE — 85025 COMPLETE CBC W/AUTO DIFF WBC: CPT

## 2024-08-12 PROCEDURE — 2580000003 HC RX 258: Performed by: INTERNAL MEDICINE

## 2024-08-12 PROCEDURE — 85027 COMPLETE CBC AUTOMATED: CPT

## 2024-08-12 RX ORDER — SODIUM CHLORIDE 0.9 % (FLUSH) 0.9 %
5-40 SYRINGE (ML) INJECTION EVERY 12 HOURS SCHEDULED
Status: DISCONTINUED | OUTPATIENT
Start: 2024-08-12 | End: 2024-08-17 | Stop reason: HOSPADM

## 2024-08-12 RX ORDER — SODIUM CHLORIDE 9 MG/ML
INJECTION, SOLUTION INTRAVENOUS PRN
Status: DISCONTINUED | OUTPATIENT
Start: 2024-08-12 | End: 2024-08-17 | Stop reason: HOSPADM

## 2024-08-12 RX ORDER — ONDANSETRON 4 MG/1
4 TABLET, ORALLY DISINTEGRATING ORAL EVERY 8 HOURS PRN
Status: DISCONTINUED | OUTPATIENT
Start: 2024-08-12 | End: 2024-08-17 | Stop reason: HOSPADM

## 2024-08-12 RX ORDER — SODIUM CHLORIDE 0.9 % (FLUSH) 0.9 %
5-40 SYRINGE (ML) INJECTION PRN
Status: DISCONTINUED | OUTPATIENT
Start: 2024-08-12 | End: 2024-08-17 | Stop reason: HOSPADM

## 2024-08-12 RX ORDER — ACETAMINOPHEN 325 MG/1
650 TABLET ORAL EVERY 6 HOURS PRN
Status: DISCONTINUED | OUTPATIENT
Start: 2024-08-12 | End: 2024-08-17 | Stop reason: HOSPADM

## 2024-08-12 RX ORDER — ACETAMINOPHEN 650 MG/1
650 SUPPOSITORY RECTAL EVERY 6 HOURS PRN
Status: DISCONTINUED | OUTPATIENT
Start: 2024-08-12 | End: 2024-08-17 | Stop reason: HOSPADM

## 2024-08-12 RX ORDER — ENOXAPARIN SODIUM 100 MG/ML
30 INJECTION SUBCUTANEOUS DAILY
Status: DISCONTINUED | OUTPATIENT
Start: 2024-08-12 | End: 2024-08-12

## 2024-08-12 RX ORDER — ATORVASTATIN CALCIUM 10 MG/1
20 TABLET, FILM COATED ORAL NIGHTLY
Status: DISCONTINUED | OUTPATIENT
Start: 2024-08-12 | End: 2024-08-17 | Stop reason: HOSPADM

## 2024-08-12 RX ORDER — HEPARIN SODIUM 1000 [USP'U]/ML
4000 INJECTION, SOLUTION INTRAVENOUS; SUBCUTANEOUS PRN
Status: DISCONTINUED | OUTPATIENT
Start: 2024-08-12 | End: 2024-08-16

## 2024-08-12 RX ORDER — PANTOPRAZOLE SODIUM 40 MG/1
40 TABLET, DELAYED RELEASE ORAL
Status: DISCONTINUED | OUTPATIENT
Start: 2024-08-13 | End: 2024-08-17 | Stop reason: HOSPADM

## 2024-08-12 RX ORDER — HEPARIN SODIUM 1000 [USP'U]/ML
4000 INJECTION, SOLUTION INTRAVENOUS; SUBCUTANEOUS ONCE
Status: COMPLETED | OUTPATIENT
Start: 2024-08-12 | End: 2024-08-12

## 2024-08-12 RX ORDER — HEPARIN SODIUM 10000 [USP'U]/100ML
1230 INJECTION, SOLUTION INTRAVENOUS CONTINUOUS
Status: DISCONTINUED | OUTPATIENT
Start: 2024-08-12 | End: 2024-08-16

## 2024-08-12 RX ORDER — CALCIUM CARBONATE-CHOLECALCIFEROL TAB 250 MG-125 UNIT 250-125 MG-UNIT
1 TAB ORAL DAILY
Status: DISCONTINUED | OUTPATIENT
Start: 2024-08-13 | End: 2024-08-17 | Stop reason: HOSPADM

## 2024-08-12 RX ORDER — ONDANSETRON 2 MG/ML
4 INJECTION INTRAMUSCULAR; INTRAVENOUS EVERY 6 HOURS PRN
Status: CANCELLED | OUTPATIENT
Start: 2024-08-12

## 2024-08-12 RX ORDER — HEPARIN SODIUM 1000 [USP'U]/ML
2000 INJECTION, SOLUTION INTRAVENOUS; SUBCUTANEOUS PRN
Status: DISCONTINUED | OUTPATIENT
Start: 2024-08-12 | End: 2024-08-16

## 2024-08-12 RX ORDER — POLYETHYLENE GLYCOL 3350 17 G/17G
17 POWDER, FOR SOLUTION ORAL DAILY PRN
Status: DISCONTINUED | OUTPATIENT
Start: 2024-08-12 | End: 2024-08-17 | Stop reason: HOSPADM

## 2024-08-12 RX ADMIN — ATORVASTATIN CALCIUM 20 MG: 10 TABLET, FILM COATED ORAL at 19:45

## 2024-08-12 RX ADMIN — HEPARIN SODIUM 4000 UNITS: 1000 INJECTION INTRAVENOUS; SUBCUTANEOUS at 19:47

## 2024-08-12 RX ADMIN — CEFEPIME 2000 MG: 2 INJECTION, POWDER, FOR SOLUTION INTRAVENOUS at 14:52

## 2024-08-12 RX ADMIN — VANCOMYCIN HYDROCHLORIDE 1500 MG: 10 INJECTION, POWDER, LYOPHILIZED, FOR SOLUTION INTRAVENOUS at 15:31

## 2024-08-12 RX ADMIN — HEPARIN SODIUM 930 UNITS/HR: 10000 INJECTION, SOLUTION INTRAVENOUS at 19:49

## 2024-08-12 RX ADMIN — ENOXAPARIN SODIUM 30 MG: 100 INJECTION SUBCUTANEOUS at 18:22

## 2024-08-12 RX ADMIN — FUROSEMIDE 5 MG/HR: 10 INJECTION, SOLUTION INTRAMUSCULAR; INTRAVENOUS at 18:21

## 2024-08-12 RX ADMIN — SODIUM CHLORIDE, PRESERVATIVE FREE 10 ML: 5 INJECTION INTRAVENOUS at 19:51

## 2024-08-12 ASSESSMENT — PAIN SCALES - GENERAL: PAINLEVEL_OUTOF10: 0

## 2024-08-12 ASSESSMENT — PAIN - FUNCTIONAL ASSESSMENT: PAIN_FUNCTIONAL_ASSESSMENT: 0-10

## 2024-08-12 NOTE — H&P
findings in the visualized upper abdomen. Soft Tissues/Bones: No skeletal abnormalities are appreciated within the chest.     1. Asymmetric pattern of dense airspace opacification in the right lower lobe.  Pneumonia or atelectasis may be considered. 2. Emphysema with evidence of old granulomatous disease.       PCP: Zander Ko III, MD    Past Medical History:        Diagnosis Date    Acid reflux     Asthenia pigmentosa (HCC)     BPH (benign prostatic hyperplasia)     Cancer (HCC)     right kidney    CHF (congestive heart failure) (HCC)     History of blood transfusion     Hyperlipidemia     Hypertension     Irregular heart beat     Kidney stones     Legal blindness     PONV (postoperative nausea and vomiting)     Retinitis        Past Surgical History:        Procedure Laterality Date    BACK SURGERY      CHOLECYSTECTOMY      CYSTOSCOPY N/A 07/07/2024    CYSTOSCOPY, CLOT EVACUATION, TRANSURETHRAL RESECTION OF BLADDER TUMOR performed by Jag Bourgeois MD at [facility] OR    EYE SURGERY Bilateral     cataract    HERNIA REPAIR Right     INGUINAL    KIDNEY REMOVAL Right 06/2024    OTHER SURGICAL HISTORY N/A 10/14/2016    LUMBAR LAMINECTOMY AND INSTRUMENTED FUSION L4/5    TURP         Medications Prior to Admission:   Prior to Admission medications    Medication Sig Start Date End Date Taking? Authorizing Provider   furosemide (LASIX) 20 MG tablet Take 2 tablets by mouth daily 8/8/24   Jazlyn Anguiano M, APRN - CNP   cephALEXin (KEFLEX) 500 MG capsule Take 1 capsule by mouth daily 7/20/24   Ann Galaviz MD   Calcium Carbonate-Vitamin D (CALCIUM-VITAMIN D) 500-200 MG-UNIT per tablet Take 1 tablet by mouth daily    Ann Galaviz MD   Multiple Vitamins-Minerals (THERAPEUTIC MULTIVITAMIN-MINERALS) tablet Take 1 tablet by mouth daily    Ann Galaviz MD   omeprazole (PRILOSEC) 20 MG delayed release capsule Take 1 capsule by mouth daily In am    Ann Galaviz MD   simvastatin  (ZOCOR) 20 MG tablet Take 1 tablet by mouth nightly    Provider, MD Ann       Labs: Personally reviewed and interpreted for clinical significance.   Recent Labs     08/12/24  1247   WBC 8.7   HGB 13.9   HCT 43.1        Recent Labs     08/12/24  1247      K 4.8      CO2 24   BUN 26*   CREATININE 2.0*   CALCIUM 9.5     Recent Labs     08/12/24  1247 08/12/24  1347   PROBNP 11,668*  --    TROPHS 64* 62*     No results for input(s): \"LABA1C\" in the last 72 hours.  Recent Labs     08/12/24  1247   AST 23   ALT 36   BILITOT 0.8   ALKPHOS 93     No results for input(s): \"INR\", \"LACTA\", \"TSH\" in the last 72 hours.     SANCHEZ GAO MD

## 2024-08-12 NOTE — ED NOTES
Patient Name: Mani Chirinos  :  1935  89 y.o.  MRN:  [id number]  Preferred Name  Mani  ED Room #:    Family/Caregiver Present no  Restraints no  Sitter no  Sepsis Risk Score      Situation  Code Status: Prior No additional code details.    Allergies: Lisinopril, Doxycycline, Ibuprofen, and Pcn [penicillins]  Weight: Patient Vitals for the past 96 hrs (Last 3 readings):   Weight   24 1219 73 kg (161 lb)     Arrived from: home  Chief Complaint:   Chief Complaint   Patient presents with    Bradycardia     Recently admitted for pneumonia, CHF, a-fib. Prescribed steroids, lasix and abx not improving.   Had follow up appointment with cardiology and was sent to ED for dyspnea, bradycardia and overall not improving symptoms since last admission.          Hospital Problem/Diagnosis:  Principal Problem:    CHF (congestive heart failure), NYHA class I, acute on chronic, combined (HCC)  Resolved Problems:    * No resolved hospital problems. *    Imaging:   POC US ECHOCARDIO TRANSTHORACIC LTD   Final Result      POC US CHEST INCLUDING MEDIASTINUM   Final Result      CT CHEST WO CONTRAST   Final Result   1. Asymmetric pattern of dense airspace opacification in the right lower   lobe.  Pneumonia or atelectasis may be considered.   2. Emphysema with evidence of old granulomatous disease.           Abnormal labs:   Abnormal Labs Reviewed   CBC WITH AUTO DIFFERENTIAL - Abnormal; Notable for the following components:       Result Value    RDW 18.1 (*)     Lymphocytes Absolute 0.9 (*)     All other components within normal limits   COMPREHENSIVE METABOLIC PANEL - Abnormal; Notable for the following components:    Glucose 111 (*)     BUN 26 (*)     Creatinine 2.0 (*)     Est, Glom Filt Rate 31 (*)     All other components within normal limits   TROPONIN - Abnormal; Notable for the following components:    Troponin, High Sensitivity 64 (*)     All other components within normal limits   BRAIN NATRIURETIC  Assessment 0 08/12/24 1220   Alcohol Cap Used No 08/12/24 1220   Dressing Status New dressing applied;Clean, dry & intact 08/12/24 1220   Dressing Type Transparent 08/12/24 1220   Dressing Intervention New 08/12/24 1220     PO Status: Regular, meal tray ordered and to be sent to room  Pertinent or High Risk Medications/Drips: no  oIf Yes, please provide details:    Pending Blood Product Administration: no    You may also review the ED PT Care Timeline found under the Summary Nursing Index tab.    Recommendation    Pending orders none  Plan for Discharge (if known):  Baseline ambulation: Independent.  Voiding: Indwelling catheter.  Belongings documented: no    Additional Comments: Hx bladder cancer and was seen by Cardiology today and was told to come to ED for increased SOB with activity and bradycardia. Patient hx afib and has been in afib during stay, HR in the 90s. Patient chest CT showed pneumonia. Cultures sent, antibiotics started. Patient is blind, but does well with verbal cues.   If any further questions, please call Sending RN at [phone]    Electronically signed by: Electronically signed by Chrissie Dunham RN on 8/12/2024 at 3:43 PM

## 2024-08-12 NOTE — CONSULTS
Pharmacy to manage Heparin - contact for questions.    Heparin 60 units/kg IV x 1 (max 4,000 units), then 12 units/kg/hr (recommended max initial rate 1,000 units/hr). Adjust infusion rate based off anti-Xa results below.     anti-Xa < 0.1    Heparin 60 units/kg bolus  Increase infusion by 4 units/kg/hr  anti-Xa 0.1-0.29 Heparin 30 units/kg bolus Increase infusion by 2 units/kg/hr  anti-Xa 0.3-0.7    No bolus No change   anti-Xa 0.71-0.8   No bolus Decrease infusion by 1 units/kg/hr  anti-Xa 0.81-0.99    No bolus Decrease infusion by 2 units/kg/hr  anti-Xa 1 or more     Hold heparin for 1 hour Decrease infusion by 3 units/kg/hr    Obtain anti-Xa 6 hours after bolus and 6 hours after any dose change until two consecutive therapeutic anti-Xa are achieved- then daily.   Initial bolus 4000 units, initial drip 930 units/hour  Bernard Linares PharmD 8/12/2024 6:55 PM      8/13 at 0203  Anti:-Xa: 0.41  Plan: Continue current heparin infusion rate  Recheck anti-Xa at 0800  Zeke Broussard PharmD 8/13/2024  2:27 AM    8/13/2024  Recent Labs     08/13/24  0754   ANTIXAUHEP 0.15*   - Heparin _2000_ units IVP bolus and then increase Heparin infusion to _1080_ units/hr.   - Recheck Anti-Xa in 6 hours at 1500.  Con Kennedy PharmD    8/13/2024 8:33 AM     8/13/2024  Recent Labs     08/13/24  1507   ANTIXAUHEP 0.39   - No change to Heparin at this time, continue Heparin infusion at _1080_ units/hr.   - Recheck Anti-Xa in 6 hours at 2100.   Con Kennedy PharmD    8/13/2024 3:37 PM     8/13 @ 2231  Anti-Xa 0.28 at 2121   Heparin 2000 units IVP bolus then increase Heparin infusion to 1230 units/hr  Recheck anti-Xa in 6 hours.  Bernard Linares PharmD 8/13/2024 10:31 PM    8/14 at 0502  Anti-Xa: 0.56  Plan: Continue current heparin infusion rate  Recheck anti-Xa at 1100  Zeke Broussard PharmD 8/14/2024  6:37 AM    8/14/2024  Recent Labs     08/14/24  1032   ANTIXAUHEP 0.51   - No change to Heparin at this time, continue Heparin infusion at _1230_

## 2024-08-12 NOTE — CONSULTS
Pharmacy Note  Vancomycin Consult    Mani Chirinos is a 89 y.o. male with pneumonia. Pharmacy consult received from AVINASH Toro to initiate vancomycin therapy. Clarified that they would like the consult to be for 1 dose, rather than the 7 that was ordered.     Patient is also receiving the following antibiotics: cefepime x1 in ED.    Allergies: Lisinopril, Doxycycline, Ibuprofen, and Pcn [penicillins]     Tmax: 98.3 F    Micro: blood x1    Imagin/12 CT Chest - \"1. Asymmetric pattern of dense airspace opacification in the right lower lobe.  Pneumonia or atelectasis may be considered. 2. Emphysema with evidence of old granulomatous disease.\"    Recent Labs     24  1247   CREATININE 2.0*   WBC 8.7     Estimated Creatinine Clearance: 26 mL/min (A) (based on SCr of 2 mg/dL (H)).  No intake or output data in the 24 hours ending 24 1504  Wt Readings from Last 1 Encounters:   24 73 kg (161 lb)       Body mass index is 23.1 kg/m².    Assessment/Plan:  Will initiate vancomycin therapy with a one time loading dose of 1500 mg (20 mg/kg).  Pharmacy will sign off.  Please re-consult pharmacy if further vancomycin therapy is warranted upon admission.     Thank you for the consult!  Krystal Tony, PharmD, BCCCP

## 2024-08-12 NOTE — PROGRESS NOTES
[facility]  Cardiac Follow-up    Primary Care Doctor:  Zander Ko III, MD    Chief Complaint   Patient presents with    Follow-up    Shortness of Breath    Fatigue        History of Present Illness:  I had the pleasure of seeing Mani Chirinos as a new patient to me for urgent visit and a hospital follow up.     PMH urothelial carcinoma bladder, carcinoma of kidney s/p laparoscopic nephroureterectomy 6/2024, HTN, blindness. After kidney removed he had a lot of leg swelling improved with catheter placed.   admitted 7/5/24-7/9/24 with shrotness of breath, possible afib with RVR, treated for PNA, not able to anticoagulated due to hematuria. Echocardiogram 7/8/24 showed LVEF 30-35%. He had 2 week cardiac monitor that showed PSVT, possible afib and has upcoming EP evaluation. He was treated with levaquin until 7/23/2024    Since last visit, started on lasix and prednisone. Cystocopy 8/9/2024 was cancelled last week after talking with anesthesiology due to concerns of acute heart fialure.   Lasix was increased to 2 tabs daily.   No shortness of breath with sitting.   Short of breath with walking  Continues to cough a lot with getting up and moving around.   Productive cough has been thick.   Was to start metoprolol after procedure for PVCs and CHF; however hasn't started.     Weight 8/1/2024 167.2  Weight today was 161lbs    Appeietie a little bit better; but doesn't want to eat at all  No fullness in the abd.    Mani Chirinos describes symptoms including dyspnea, dyspnea on exertion but denies syncope.     Past Medical History:   has a past medical history of Acid reflux, Asthenia pigmentosa (HCC), BPH (benign prostatic hyperplasia), Cancer (HCC), CHF (congestive heart failure) (HCC), History of blood transfusion, Hyperlipidemia, Hypertension, Irregular heart beat, Kidney stones, Legal blindness, PONV (postoperative nausea and vomiting), and Retinitis.  Surgical History:   has a past

## 2024-08-12 NOTE — ED PROVIDER NOTES
[facility]  ED  EMERGENCY DEPARTMENT ENCOUNTER        Pt Name: Mani Chirinos  MRN: [id number]  Birthdate 1935  Date of evaluation: 8/12/2024  Provider: AVINASH Ordoñez - CNP  PCP: Zander Ko III, MD         I have seen and evaluated this patient with my supervising physician  Dr. Winter      CHIEF COMPLAINT       Chief Complaint   Patient presents with    Bradycardia     Recently admitted for pneumonia, CHF, a-fib. Prescribed steroids, lasix and abx not improving.   Had follow up appointment with cardiology and was sent to ED for dyspnea, bradycardia and overall not improving symptoms since last admission.            HISTORY OF PRESENT ILLNESS: 1 or more Elements     History From: the patient  Limitations to history : None    Mani Chirinos is a 89 y.o. male who presents to the ER today with complaints of bradycardia, patient was recently admitted for pneumonia, CHF and A-fib, patient saw cardiology this morning, they were concerned about lingering pneumonia and CHF exacerbation.  Patient here for further evaluation.    Nursing Notes were all reviewed and agreed with or any disagreements were addressed in the HPI.    REVIEW OF SYSTEMS :      Review of Systems    Positives and Pertinent negatives as per HPI.     SURGICAL HISTORY     Past Surgical History:   Procedure Laterality Date    BACK SURGERY      CHOLECYSTECTOMY      CYSTOSCOPY N/A 07/07/2024    CYSTOSCOPY, CLOT EVACUATION, TRANSURETHRAL RESECTION OF BLADDER TUMOR performed by Jag Bourgeois MD at [facility] OR    EYE SURGERY Bilateral     cataract    HERNIA REPAIR Right     INGUINAL    KIDNEY REMOVAL Right 06/2024    OTHER SURGICAL HISTORY N/A 10/14/2016    LUMBAR LAMINECTOMY AND INSTRUMENTED FUSION L4/5    TURP         CURRENTMEDICATIONS       Previous Medications    CALCIUM CARBONATE-VITAMIN D (CALCIUM-VITAMIN D) 500-200 MG-UNIT PER TABLET    Take 1 tablet by mouth daily    CEPHALEXIN (KEFLEX) 500   Subxiphoid         Parasternal long-axis         Parasternal short-axis         Apical 4-chamber     Findings:          Pericardial Effusion:  Small pericardial effusion         Cardiac Activity:  Cardiac activity normal         LV function:  Indeterminate         RV diameter:  Normal     Interpretation:          Normal limited cardiac ultrasound  Electronically signed by Jose Winter on Monday, August 12, 2024 at 2:28 PM : Attending:     POC US CHEST INCLUDING MEDIASTINUM    Result Date: 8/12/2024  POCUS_Thoracic     Exam Information:          Exam type:  Clinically indicated     Indication(s) for Exam:          The exam was performed with the following indications::  Dyspnea     Views:          Right anterior / superior thorax:  Adequate         Right lateral / inferior thorax:  Adequate         Left anterior / superior thorax:  Adequate         Left lateral / inferior thorax:  Adequate     Findings:          Lung sliding:  Present         Lung point sign:  Absent         Interstitium: a-lines:  Present         Lung consolidation:  Indeterminate     Interpretation:          Pneumothorax:  Not present         No sonographic evidence of acute pulmonary disease     Confirmatory study:          What confirmatory study was done?:  CT  Electronically signed by Jose Winter on Monday, August 12, 2024 at 2:27 PM : Attending:     CT CHEST WO CONTRAST    Result Date: 8/12/2024  EXAMINATION: CT OF THE CHEST WITHOUT CONTRAST 8/12/2024 1:36 pm TECHNIQUE: CT of the chest was performed without the administration of intravenous contrast. Multiplanar reformatted images are provided for review. Automated exposure control, iterative reconstruction, and/or weight based adjustment of the mA/kV was utilized to reduce the radiation dose to as low as reasonably achievable. COMPARISON: None. HISTORY: ORDERING SYSTEM PROVIDED HISTORY: arrhythmia, sob, tachy TECHNOLOGIST PROVIDED HISTORY: Reason for

## 2024-08-13 ENCOUNTER — APPOINTMENT (OUTPATIENT)
Dept: GENERAL RADIOLOGY | Age: 89
DRG: 291 | End: 2024-08-13
Payer: MEDICARE

## 2024-08-13 PROBLEM — I47.29 NSVT (NONSUSTAINED VENTRICULAR TACHYCARDIA) (HCC): Status: ACTIVE | Noted: 2024-08-13

## 2024-08-13 LAB
ANION GAP SERPL CALCULATED.3IONS-SCNC: 12 MMOL/L (ref 3–16)
ANTI-XA UNFRAC HEPARIN: 0.15 IU/ML (ref 0.3–0.7)
ANTI-XA UNFRAC HEPARIN: 0.28 IU/ML (ref 0.3–0.7)
ANTI-XA UNFRAC HEPARIN: 0.39 IU/ML (ref 0.3–0.7)
ANTI-XA UNFRAC HEPARIN: 0.41 IU/ML (ref 0.3–0.7)
BASOPHILS # BLD: 0.1 K/UL (ref 0–0.2)
BASOPHILS NFR BLD: 1 %
BUN SERPL-MCNC: 26 MG/DL (ref 7–20)
CALCIUM SERPL-MCNC: 8.7 MG/DL (ref 8.3–10.6)
CHLORIDE SERPL-SCNC: 102 MMOL/L (ref 99–110)
CHOLEST SERPL-MCNC: 123 MG/DL (ref 0–199)
CO2 SERPL-SCNC: 24 MMOL/L (ref 21–32)
CREAT SERPL-MCNC: 2.1 MG/DL (ref 0.8–1.3)
CRP SERPL-MCNC: 18.4 MG/L (ref 0–5.1)
DEPRECATED RDW RBC AUTO: 18.6 % (ref 12.4–15.4)
EKG ATRIAL RATE: 85 BPM
EKG DIAGNOSIS: NORMAL
EKG P AXIS: 23 DEGREES
EKG P-R INTERVAL: 136 MS
EKG Q-T INTERVAL: 398 MS
EKG QRS DURATION: 94 MS
EKG QTC CALCULATION (BAZETT): 473 MS
EKG R AXIS: -11 DEGREES
EKG T AXIS: 72 DEGREES
EKG VENTRICULAR RATE: 85 BPM
EOSINOPHIL # BLD: 0.2 K/UL (ref 0–0.6)
EOSINOPHIL NFR BLD: 2.6 %
GFR SERPLBLD CREATININE-BSD FMLA CKD-EPI: 30 ML/MIN/{1.73_M2}
GLUCOSE SERPL-MCNC: 107 MG/DL (ref 70–99)
HCT VFR BLD AUTO: 40 % (ref 40.5–52.5)
HDLC SERPL-MCNC: 39 MG/DL (ref 40–60)
HGB BLD-MCNC: 12.6 G/DL (ref 13.5–17.5)
LDLC SERPL CALC-MCNC: 63 MG/DL
LYMPHOCYTES # BLD: 1.1 K/UL (ref 1–5.1)
LYMPHOCYTES NFR BLD: 13.6 %
MAGNESIUM SERPL-MCNC: 2 MG/DL (ref 1.8–2.4)
MCH RBC QN AUTO: 26.2 PG (ref 26–34)
MCHC RBC AUTO-ENTMCNC: 31.5 G/DL (ref 31–36)
MCV RBC AUTO: 83 FL (ref 80–100)
MONOCYTES # BLD: 0.5 K/UL (ref 0–1.3)
MONOCYTES NFR BLD: 6.6 %
NEUTROPHILS # BLD: 6.2 K/UL (ref 1.7–7.7)
NEUTROPHILS NFR BLD: 76.2 %
PLATELET # BLD AUTO: 139 K/UL (ref 135–450)
PMV BLD AUTO: 7.8 FL (ref 5–10.5)
POTASSIUM SERPL-SCNC: 3.8 MMOL/L (ref 3.5–5.1)
RBC # BLD AUTO: 4.82 M/UL (ref 4.2–5.9)
SODIUM SERPL-SCNC: 138 MMOL/L (ref 136–145)
T4 FREE SERPL-MCNC: 1.4 NG/DL (ref 0.9–1.8)
TRIGL SERPL-MCNC: 104 MG/DL (ref 0–150)
TSH SERPL DL<=0.005 MIU/L-ACNC: 2.81 UIU/ML (ref 0.27–4.2)
VLDLC SERPL CALC-MCNC: 21 MG/DL
WBC # BLD AUTO: 8.2 K/UL (ref 4–11)

## 2024-08-13 PROCEDURE — 6360000002 HC RX W HCPCS: Performed by: INTERNAL MEDICINE

## 2024-08-13 PROCEDURE — 36415 COLL VENOUS BLD VENIPUNCTURE: CPT

## 2024-08-13 PROCEDURE — 93005 ELECTROCARDIOGRAM TRACING: CPT | Performed by: NURSE PRACTITIONER

## 2024-08-13 PROCEDURE — 6370000000 HC RX 637 (ALT 250 FOR IP): Performed by: INTERNAL MEDICINE

## 2024-08-13 PROCEDURE — 80061 LIPID PANEL: CPT

## 2024-08-13 PROCEDURE — 93010 ELECTROCARDIOGRAM REPORT: CPT | Performed by: INTERNAL MEDICINE

## 2024-08-13 PROCEDURE — 85025 COMPLETE CBC W/AUTO DIFF WBC: CPT

## 2024-08-13 PROCEDURE — 86140 C-REACTIVE PROTEIN: CPT

## 2024-08-13 PROCEDURE — 2580000003 HC RX 258: Performed by: INTERNAL MEDICINE

## 2024-08-13 PROCEDURE — 85520 HEPARIN ASSAY: CPT

## 2024-08-13 PROCEDURE — 71046 X-RAY EXAM CHEST 2 VIEWS: CPT

## 2024-08-13 PROCEDURE — 80048 BASIC METABOLIC PNL TOTAL CA: CPT

## 2024-08-13 PROCEDURE — 99223 1ST HOSP IP/OBS HIGH 75: CPT | Performed by: INTERNAL MEDICINE

## 2024-08-13 PROCEDURE — 83735 ASSAY OF MAGNESIUM: CPT

## 2024-08-13 PROCEDURE — 99222 1ST HOSP IP/OBS MODERATE 55: CPT | Performed by: INTERNAL MEDICINE

## 2024-08-13 PROCEDURE — 2060000000 HC ICU INTERMEDIATE R&B

## 2024-08-13 RX ORDER — HEPARIN SODIUM 1000 [USP'U]/ML
2000 INJECTION, SOLUTION INTRAVENOUS; SUBCUTANEOUS ONCE
Status: COMPLETED | OUTPATIENT
Start: 2024-08-13 | End: 2024-08-13

## 2024-08-13 RX ADMIN — HEPARIN SODIUM 2000 UNITS: 1000 INJECTION INTRAVENOUS; SUBCUTANEOUS at 09:14

## 2024-08-13 RX ADMIN — PANTOPRAZOLE SODIUM 40 MG: 40 TABLET, DELAYED RELEASE ORAL at 06:15

## 2024-08-13 RX ADMIN — CEFEPIME 1000 MG: 1 INJECTION, POWDER, FOR SOLUTION INTRAMUSCULAR; INTRAVENOUS at 16:25

## 2024-08-13 RX ADMIN — FUROSEMIDE 5 MG/HR: 10 INJECTION, SOLUTION INTRAMUSCULAR; INTRAVENOUS at 13:57

## 2024-08-13 RX ADMIN — SODIUM CHLORIDE, PRESERVATIVE FREE 10 ML: 5 INJECTION INTRAVENOUS at 09:17

## 2024-08-13 RX ADMIN — Medication 1 TABLET: at 09:58

## 2024-08-13 RX ADMIN — HEPARIN SODIUM 2000 UNITS: 1000 INJECTION INTRAVENOUS; SUBCUTANEOUS at 22:50

## 2024-08-13 RX ADMIN — CEFEPIME 1000 MG: 1 INJECTION, POWDER, FOR SOLUTION INTRAMUSCULAR; INTRAVENOUS at 02:24

## 2024-08-13 ASSESSMENT — PAIN SCALES - GENERAL: PAINLEVEL_OUTOF10: 0

## 2024-08-13 NOTE — CONSULTS
[facility]  Nephrology Consult Note           Reason for Consult: TIERA on chronic kidney disease stage III  Requesting Physician:  Dr. Connor    Chief Complaint:    Chief Complaint   Patient presents with    Bradycardia     Recently admitted for pneumonia, CHF, a-fib. Prescribed steroids, lasix and abx not improving.   Had follow up appointment with cardiology and was sent to ED for dyspnea, bradycardia and overall not improving symptoms since last admission.          History of Present Illness on 8/13/24:    89 y.o. 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  He also has had anemia and needed blood transfusions in July along with iron transfusions as well.  Since admission he was started on Lasix drip at 5 mg/h as outpatient management for heart failure did not improve his symptoms despite diuresing.    Past Medical History:        Diagnosis Date    Acid reflux     Asthenia pigmentosa (HCC)     BPH (benign prostatic hyperplasia)     Cancer (HCC)     right kidney    CHF (congestive heart failure) (HCC)     History of blood transfusion     Hyperlipidemia     Hypertension     Irregular heart beat     Kidney stones     Legal blindness     PONV (postoperative nausea and vomiting)     Retinitis        Past Surgical History:        Procedure Laterality Date    BACK SURGERY      CHOLECYSTECTOMY      CYSTOSCOPY N/A 07/07/2024    CYSTOSCOPY, CLOT EVACUATION, TRANSURETHRAL RESECTION OF BLADDER TUMOR performed by Jag Bourgeois MD at [facility] OR    EYE SURGERY Bilateral

## 2024-08-13 NOTE — PROGRESS NOTES
Hospital Medicine Progress Note      Date of Admission: 8/12/2024  Hospital Day: 2    Chief Admission Complaint:  Dyspnea with exertion     Subjective:  EMR and note reviewed pt seen and examined. This am had a few beats of VT, repeat ECG. No worsening sob or chest pain.     Presenting Admission History:     89 y.o. male who presented to [facility] with complaint of worsening shortness of breath particularly with exertion.  Patient was seen in the cardiologist office by Jennifer Anguiano.  Patient was sent to the emergency department for CHF exacerbation.  Patient denies orthopnea.  He has had a productive cough.  No nausea or vomiting currently.  Appetite is decreased.  Patient status post recent nephrectomy of the right side due to urothelial carcinoma of the bladder.  Patient not on anticoagulation due to hematuria.  Last echo from 7/8 showed EF of 30 to 35%.  Patient recently had been on Levaquin until 7/23.  Patient has also been on Keflex.     ED did evaluate patient.  Patient with some pulmonary edema.  Patient also with possible pneumonia.  Patient started on IV antibiotics.    PT admitted for further eval and treatment            Assessment/Plan:      Current Principal Problem:  CHF (congestive heart failure), NYHA class I, acute on chronic, combined (HCC)    Acute exacerbation of CHF.  Patient with heart failure with reduced ejection fraction.  Last EF 30 to 35%.    Cardiology will be consulted. 8/12 started on Lasix drip.    Monitor renal function carefully.    Monitor I's and O's and daily weights.   Repeat echocardiogram as symptoms have worsened since last echo.  - resume GDMT as tolerated     elevated Trop; unlikely ACS felt ischemic demand       PVC's/ NSVt/ AF:   - keep K and mg therapeutic  - Cards following, notes reviewed plans for EP to see    - heparin gtt     Possible pneumonia.  Patient will continue on cefepime.  Patient does have allergy to penicillin without listed reaction.  Will

## 2024-08-13 NOTE — PROGRESS NOTES
CHF Care Plan      Patient's EF (Ejection Fraction) is less than 40%    Heart Failure Medications:  Diuretics:: Furosemide    (One of the following REQUIRED for EF </= 40%/SYSTOLIC FAILURE but MAY be used in EF% >40%/DIASTOLIC FAILURE)        ACE:: None        ARB:: None         ARNI:: None    (Beta Blockers)  NON- Evidenced Based Beta Blocker (for EF% >40%/DIASTOLIC FAILURE): None    Evidenced Based Beta Blocker::(REQUIRED for EF% <40%/SYSTOLIC FAILURE) None  ...................................................................................................................................................    Failed to redirect to the Timeline version of the [employer] SmartLink.      Patient's weights and intake/output reviewed    Daily Weight log at bedside, patient/family participation in use of log: \"yes    Patient's current weight today shows a difference of 7 lbs less than last documented weight.      Intake/Output Summary (Last 24 hours) at 8/13/2024 1811  Last data filed at 8/13/2024 1631  Gross per 24 hour   Intake 158 ml   Output 2250 ml   Net -2092 ml       Education Booklet Provided: yes    Comorbidities Reviewed Yes    Patient has a past medical history of Acid reflux, Asthenia pigmentosa (HCC), BPH (benign prostatic hyperplasia), Cancer (HCC), CHF (congestive heart failure) (HCC), History of blood transfusion, Hyperlipidemia, Hypertension, Irregular heart beat, Kidney stones, Legal blindness, PONV (postoperative nausea and vomiting), and Retinitis.     >>For CHF and Comorbidity documentation on Education Time and Topics, please see Education Tab      Pt resting in bed at this time on room air. Pt with complaints of shortness of breath. Pt without lower extremity edema.     Patient and/or Family's stated Goal of Care this Admission: reduce shortness of breath, increase activity tolerance, better understand heart failure and disease management, and be more comfortable prior to discharge        :

## 2024-08-13 NOTE — PROGRESS NOTES
CHF Care Plan      Patient's EF (Ejection Fraction) is less than 40%    Heart Failure Medications:  Diuretics:: Furosemide    (One of the following REQUIRED for EF </= 40%/SYSTOLIC FAILURE but MAY be used in EF% >40%/DIASTOLIC FAILURE)        ACE:: None        ARB:: None         ARNI:: None    (Beta Blockers)  NON- Evidenced Based Beta Blocker (for EF% >40%/DIASTOLIC FAILURE): None    Evidenced Based Beta Blocker::(REQUIRED for EF% <40%/SYSTOLIC FAILURE) None  ...................................................................................................................................................    Failed to redirect to the Timeline version of the [employer] SmartLink.      Patient's weights and intake/output reviewed    Daily Weight log at bedside, patient/family participation in use of log: \"yes    Patient's current weight today shows a difference of 11 lbs less than last documented weight.      Intake/Output Summary (Last 24 hours) at 8/13/2024 0634  Last data filed at 8/13/2024 0408  Gross per 24 hour   Intake 158 ml   Output 1600 ml   Net -1442 ml       Education Booklet Provided: yes    Comorbidities Reviewed Yes    Patient has a past medical history of Acid reflux, Asthenia pigmentosa (HCC), BPH (benign prostatic hyperplasia), Cancer (HCC), CHF (congestive heart failure) (HCC), History of blood transfusion, Hyperlipidemia, Hypertension, Irregular heart beat, Kidney stones, Legal blindness, PONV (postoperative nausea and vomiting), and Retinitis.     >>For CHF and Comorbidity documentation on Education Time and Topics, please see Education Tab      Pt resting in bed at this time on room air. Pt denies shortness of breath. Pt with nonpitting lower extremity edema.     Patient and/or Family's stated Goal of Care this Admission: reduce shortness of breath, increase activity tolerance, better understand heart failure and disease management, and be more comfortable prior to discharge        :

## 2024-08-13 NOTE — PROGRESS NOTES
[facility] called to report that pt had a short run of VT. Hospitalist made aware, strip reviewed. Order placed for EKG. EKG tech notified. Ny Fried RN

## 2024-08-13 NOTE — CONSULTS
CARDIOLOGY CONSULTATION        Patient Name: Mani Chirinos  Date of admission: 8/12/2024 12:11 PM  Admission Dx: Shortness of breath [R06.02]  CHF (congestive heart failure), NYHA class I, acute on chronic, combined (HCC) [I50.43]  Atrial fibrillation, unspecified type (HCC) [I48.91]  Pneumonia due to infectious organism, unspecified laterality, unspecified part of lung [J18.9]  Acute on chronic congestive heart failure, unspecified heart failure type (HCC) [I50.9]  Requesting Physician: Checo Auguste MD  Primary Care physician: Zander Ko III, MD    Reason for Consultation/Chief Complaint: SOB    History of Present Illness:     Mani Chirinos is a 89 y.o. 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  Patient is followed routinely in clinic by HILL Anguiano.  Of note, patient is not on anticoagulation due to past history of hematuria.  Patient reports ongoing SOB and fatigue, but he denies chest pain, palpitations and light headedness.  In ED, CT chest showed RLL pneumonia and emphysema.  Patient was started on Lasix drip on admit.  Telemetry overnight showed frequent PVCs and brief runs of NSVT.    Past Medical History:   has a past medical history of Acid reflux, Asthenia pigmentosa (HCC), BPH (benign prostatic hyperplasia), Cancer (HCC), CHF (congestive heart failure) ([facility]), History of blood transfusion, Hyperlipidemia, Hypertension, Irregular heart beat, Kidney stones, Legal blindness, PONV (postoperative nausea and vomiting), and Retinitis.    Surgical History:   has a past surgical history that includes hernia repair (Right); Cholecystectomy; back surgery; TURP; eye surgery (Bilateral); other surgical history (N/A, 10/14/2016); Cystoscopy (N/A, 07/07/2024); and Kidney removal (Right, 06/2024).     Social

## 2024-08-13 NOTE — DISCHARGE INSTRUCTIONS
Heart Failure Resources:  Heart Failure Interactive Workbook:  Go to [email or web address]/publication/?o=048511 for a Free Heart Failure Interactive Workbook provided by The American Heart Association. This interactive workbook will provide information on Healthier Living with Heart Failure. Please copy and paste link into search bar. Use your mouse to scroll through the pages.    HF [place] amina:   Heart Failure Free smart phone amina available for iPhone and Android download. Use your phone to track sodium intake, fluid intake, symptoms, and weight.     Low Sodium Diet / Recipes:  Go to www.[employer] website for “renal” diet which is Low Sodium! [employer] is a dialysis company, but this website offers free seasonal cookbooks. Each quarter, they will release 25-30 new recipes with a breakdown of calories, sodium, and glucose. You can also go to www.[employer]/recipes website for free recipes.     Home Exercise Program:   Identification of Green/Yellow/Red zones:  You should be able to identify when you feel good (green zone), if you have 1-2 symptoms of HF (yellow zone), or if you are in need of medical attention (red zone).  In your CHF education folder you were provided a “stop light tool” to outline this information.     We want to you to rate your exertion levels:    Our therapy team has discussed means of identification with you such as the \"Vitor scale.\"  The Vitor rating scale ranges from 6 to 20, where 6 means \"no exertion at all\" and 20 means \"maximal exertion.\" The goal is to use this to gauge how much effort it is taking for you to do your normal daily tasks.   You should be able to recognize when too much exertion is being expended.    Elements of Energy Conservation:   Prioritize/Plan: Decide what needs to be done today, and what can wait for a later date, write to do lists, plan ahead to avoid extra trips, and gather supplies and equipment needed before starting an activity.   Position:

## 2024-08-13 NOTE — FLOWSHEET NOTE
08/13/24 0805   Assessment   Charting Type Shift assessment   Psychosocial   Psychosocial (WDL) WDL   Neurological   Neuro (WDL) WDL   Level of Consciousness 0   [place] Coma Scale   Eye Opening 4   Best Verbal Response 5   Best Motor Response 6   Los Coma Scale Score 15   NIHSS Stroke Scale   NIHSS Stroke Scale Assessed No   HEENT (Head, Ears, Eyes, Nose, & Throat)   HEENT (WDL) X   Right Eye Blind   Left Eye Blind   Respiratory   Respiratory (WDL) WDL   Respiratory Pattern Regular   Respiratory Depth Normal   Respiratory Quality/Effort Dyspnea with exertion   Chest Assessment Chest expansion symmetrical;Trachea midline   L Breath Sounds Diminished   R Breath Sounds Diminished   Cardiac   Cardiac (WDL) X   Cardiac Regularity Irregular   Heart Sounds S1, S2   Cardiac Rhythm Atrial fib   Rhythm Interpretation   Pulse 60   Cardiac Monitor   Cardiac/Telemetry Monitor On Portable telemetry pack applied   Telemetry Box Number 2   Gastrointestinal   Abdominal (WDL) WDL   Genitourinary   Genitourinary (WDL) X  (chronic friedman)   Flank Tenderness NOREEN   Suprapubic Tenderness No   Dysuria (Pain/Burning w/Urination) No   Peripheral Vascular   Peripheral Vascular (WDL) WDL   Edema None   RLE Neurovascular Assessment   Capillary Refill Less than/Equal to 3 seconds   Color Appropriate for Ethnicity   Temperature Cool   RLE Sensation  Full sensation   R Pedal Pulse +2   LLE Neurovascular Assessment   Capillary Refill Less than/Equal to 3 seconds   Color Appropriate for Ethnicity   Temperature Cool   LLE Sensation  Full sensation   L Pedal Pulse +2   Skin Integumentary    Skin Integumentary (WDL) X  (ABD surgical incision)   Skin Integrity Site 2   Skin Integrity Location 2 Wound (see LDA)   Location 2 abdominal incision  (healing)   Musculoskeletal   Musculoskeletal (WDL) WDL   Urinary Catheter 08/12/24   Placement Date: 08/12/24   Present on Admission/Arrival: Yes   Catheter Indications Perioperative use for selected  surgical procedures  (placed by urology s/p kidney removal)   Site Assessment Pink   Urine Color Yellow   Urine Appearance Clear   Collection Container Standard   Securement Method Securing device (Describe)   Catheter Best Practices  Drainage tube clipped to bed;Catheter secured to thigh;Tamper seal intact;Bag below bladder;Bag not on floor;Lack of dependent loop in tubing;Drainage bag less than half full   Status Draining

## 2024-08-13 NOTE — PROGRESS NOTES
ALYCE at [address] [facility] called d/t run of MIGUEL ANGEL. Here for afib, cardiology on board. Just wanted to let you know.     ALYCE Crane, NP at 1004 Pt had 7 beats of vtach. No magnesium drawn when being admitted today, Vitals are good /69, HR 82 O2 95.  Pt asymptomatic. Do you want one drawn?    Mag drawn and was 2.00

## 2024-08-13 NOTE — CARE COORDINATION
Case Management Assessment  Initial Evaluation    Date/Time of Evaluation: 8/13/2024 12:04 PM  Assessment Completed by: Shannan Umanzor RN    If patient is discharged prior to next notation, then this note serves as note for discharge by case management.    Patient Name: Mani Chirinos                   YOB: 1935  Diagnosis: Shortness of breath [R06.02]  CHF (congestive heart failure), NYHA class I, acute on chronic, combined (HCC) [I50.43]  Atrial fibrillation, unspecified type (HCC) [I48.91]  Pneumonia due to infectious organism, unspecified laterality, unspecified part of lung [J18.9]  Acute on chronic congestive heart failure, unspecified heart failure type (HCC) [I50.9]                   Date / Time: 8/12/2024 12:11 PM    Patient Admission Status: Inpatient   Readmission Risk (Low < 19, Mod (19-27), High > 27): Readmission Risk Score: 13.8    Current PCP: Zander Ko III, MD  PCP verified by CM? (P) Yes    Chart Reviewed: Yes      History Provided by: (P) Patient  Patient Orientation: (P) Alert and Oriented    Patient Cognition: (P) Alert    Hospitalization in the last 30 days (Readmission):  No    If yes, Readmission Assessment in CM Navigator will be completed.    Advance Directives:      Code Status: Full Code   Patient's Primary Decision Maker is: (P) Legal Next of Kin    Primary Decision Maker: Coral Chirinos [facility] - Domestic Partner - [phone]    Discharge Planning:    Patient lives with: (P) Spouse/Significant Other Type of Home: (P) House  Primary Care Giver: (P) Self  Patient Support Systems include: (P) Spouse/Significant Other, Children   Current Financial resources:    Current community resources: (P) None  Current services prior to admission: (P) Durable Medical Equipment            Current DME: (P) Walker[facility]            Type of Home Care services:  (P) None    ADLS  Prior functional level: (P) Independent in ADLs/IADLs  Current functional level: (P) Independent

## 2024-08-13 NOTE — CONSULTS
Nutrition Education  Consult received for CHF diet education. Pt out of room at time of visit, spoke to family member. Reports she does not cook w/ salt much and he mostly avoids table salt. Reports pt likely drinks more than 2000 ml per day d/t drinking a lot of tea and lemonade. Encouraged pt to have more water to help w/ thirst and watch fluid intakes. Encouraged compliance w/ daily weights. Provided with written and verbal instruction on HF nutrition therapy. Voiced understanding.     Time spent: 7 minutes    Educated on CHF diet education  Learners: Family  Readiness: Acceptance  Method: Explanation and Handout  Response: Verbalizes Understanding  Contact name and number provided.    Maria Isabel Bergman, MS, RD, LD  Contact Number: Office: [phone]; [place]: [phone]

## 2024-08-14 PROBLEM — N17.9 AKI (ACUTE KIDNEY INJURY) (HCC): Status: ACTIVE | Noted: 2024-08-14

## 2024-08-14 PROBLEM — I50.9 ACUTE DECOMPENSATED HEART FAILURE (HCC): Status: ACTIVE | Noted: 2024-08-14

## 2024-08-14 PROBLEM — I49.3 PVC (PREMATURE VENTRICULAR CONTRACTION): Status: ACTIVE | Noted: 2024-08-14

## 2024-08-14 PROBLEM — I42.9 CARDIOMYOPATHY (HCC): Status: ACTIVE | Noted: 2024-08-14

## 2024-08-14 PROBLEM — I50.21 ACUTE HFREF (HEART FAILURE WITH REDUCED EJECTION FRACTION) (HCC): Status: ACTIVE | Noted: 2024-08-14

## 2024-08-14 LAB
ANION GAP SERPL CALCULATED.3IONS-SCNC: 13 MMOL/L (ref 3–16)
ANTI-XA UNFRAC HEPARIN: 0.51 IU/ML (ref 0.3–0.7)
ANTI-XA UNFRAC HEPARIN: 0.56 IU/ML (ref 0.3–0.7)
BUN SERPL-MCNC: 27 MG/DL (ref 7–20)
CALCIUM SERPL-MCNC: 9.2 MG/DL (ref 8.3–10.6)
CHLORIDE SERPL-SCNC: 99 MMOL/L (ref 99–110)
CO2 SERPL-SCNC: 25 MMOL/L (ref 21–32)
CREAT SERPL-MCNC: 2 MG/DL (ref 0.8–1.3)
DEPRECATED RDW RBC AUTO: 19 % (ref 12.4–15.4)
EKG ATRIAL RATE: 147 BPM
EKG DIAGNOSIS: NORMAL
EKG DIAGNOSIS: NORMAL
EKG Q-T INTERVAL: 390 MS
EKG Q-T INTERVAL: 400 MS
EKG QRS DURATION: 90 MS
EKG QRS DURATION: 90 MS
EKG QTC CALCULATION (BAZETT): 484 MS
EKG QTC CALCULATION (BAZETT): 512 MS
EKG R AXIS: -11 DEGREES
EKG R AXIS: -15 DEGREES
EKG T AXIS: 57 DEGREES
EKG T AXIS: 64 DEGREES
EKG VENTRICULAR RATE: 104 BPM
EKG VENTRICULAR RATE: 88 BPM
GFR SERPLBLD CREATININE-BSD FMLA CKD-EPI: 31 ML/MIN/{1.73_M2}
GLUCOSE SERPL-MCNC: 119 MG/DL (ref 70–99)
HCT VFR BLD AUTO: 40.2 % (ref 40.5–52.5)
HGB BLD-MCNC: 13.1 G/DL (ref 13.5–17.5)
MAGNESIUM SERPL-MCNC: 2.1 MG/DL (ref 1.8–2.4)
MCH RBC QN AUTO: 26.9 PG (ref 26–34)
MCHC RBC AUTO-ENTMCNC: 32.6 G/DL (ref 31–36)
MCV RBC AUTO: 82.5 FL (ref 80–100)
PLATELET # BLD AUTO: 153 K/UL (ref 135–450)
PMV BLD AUTO: 8.3 FL (ref 5–10.5)
POTASSIUM SERPL-SCNC: 3.5 MMOL/L (ref 3.5–5.1)
RBC # BLD AUTO: 4.87 M/UL (ref 4.2–5.9)
SODIUM SERPL-SCNC: 137 MMOL/L (ref 136–145)
WBC # BLD AUTO: 7.5 K/UL (ref 4–11)

## 2024-08-14 PROCEDURE — 6360000002 HC RX W HCPCS: Performed by: INTERNAL MEDICINE

## 2024-08-14 PROCEDURE — 99232 SBSQ HOSP IP/OBS MODERATE 35: CPT | Performed by: NURSE PRACTITIONER

## 2024-08-14 PROCEDURE — 85520 HEPARIN ASSAY: CPT

## 2024-08-14 PROCEDURE — 2580000003 HC RX 258: Performed by: INTERNAL MEDICINE

## 2024-08-14 PROCEDURE — 36415 COLL VENOUS BLD VENIPUNCTURE: CPT

## 2024-08-14 PROCEDURE — 93010 ELECTROCARDIOGRAM REPORT: CPT | Performed by: INTERNAL MEDICINE

## 2024-08-14 PROCEDURE — 80048 BASIC METABOLIC PNL TOTAL CA: CPT

## 2024-08-14 PROCEDURE — 2060000000 HC ICU INTERMEDIATE R&B

## 2024-08-14 PROCEDURE — 6370000000 HC RX 637 (ALT 250 FOR IP): Performed by: NURSE PRACTITIONER

## 2024-08-14 PROCEDURE — 99233 SBSQ HOSP IP/OBS HIGH 50: CPT | Performed by: NURSE PRACTITIONER

## 2024-08-14 PROCEDURE — 6370000000 HC RX 637 (ALT 250 FOR IP): Performed by: INTERNAL MEDICINE

## 2024-08-14 PROCEDURE — 85027 COMPLETE CBC AUTOMATED: CPT

## 2024-08-14 PROCEDURE — 83735 ASSAY OF MAGNESIUM: CPT

## 2024-08-14 RX ORDER — METOPROLOL SUCCINATE 25 MG/1
25 TABLET, EXTENDED RELEASE ORAL DAILY
Status: DISCONTINUED | OUTPATIENT
Start: 2024-08-14 | End: 2024-08-17 | Stop reason: HOSPADM

## 2024-08-14 RX ADMIN — FUROSEMIDE 5 MG/HR: 10 INJECTION, SOLUTION INTRAMUSCULAR; INTRAVENOUS at 10:51

## 2024-08-14 RX ADMIN — PANTOPRAZOLE SODIUM 40 MG: 40 TABLET, DELAYED RELEASE ORAL at 07:50

## 2024-08-14 RX ADMIN — Medication 1 TABLET: at 07:50

## 2024-08-14 RX ADMIN — ATORVASTATIN CALCIUM 20 MG: 10 TABLET, FILM COATED ORAL at 01:15

## 2024-08-14 RX ADMIN — CEFEPIME 1000 MG: 1 INJECTION, POWDER, FOR SOLUTION INTRAMUSCULAR; INTRAVENOUS at 03:18

## 2024-08-14 RX ADMIN — ATORVASTATIN CALCIUM 20 MG: 10 TABLET, FILM COATED ORAL at 22:19

## 2024-08-14 RX ADMIN — METOPROLOL SUCCINATE 25 MG: 25 TABLET, EXTENDED RELEASE ORAL at 15:41

## 2024-08-14 RX ADMIN — CEFEPIME 1000 MG: 1 INJECTION, POWDER, FOR SOLUTION INTRAMUSCULAR; INTRAVENOUS at 15:41

## 2024-08-14 RX ADMIN — HEPARIN SODIUM 1230 UNITS/HR: 10000 INJECTION, SOLUTION INTRAVENOUS at 19:43

## 2024-08-14 RX ADMIN — SODIUM CHLORIDE, PRESERVATIVE FREE 10 ML: 5 INJECTION INTRAVENOUS at 07:50

## 2024-08-14 ASSESSMENT — PAIN SCALES - GENERAL: PAINLEVEL_OUTOF10: 0

## 2024-08-14 NOTE — CONSULTS
daily.    ALLERGIES:  INCLUDE LISINOPRIL, DOXYCYCLINE, IBUPROFEN, AND PENICILLIN.      SOCIAL HISTORY:  The patient does not smoke cigarettes.  He does not consume alcohol at this time.    FAMILY HISTORY:  Remarkable for hypertension and diabetes in the mother, history of heart disease in the father.    REVIEW OF SYSTEMS:  Demonstrate no recent change in appetite or change in weight.  The patient has not described fever or chills.  He does describe a progressive shortness of breath and cough over the past several days.  He has not had chest pain, near-syncope, or syncope.  All other components of the 14-system review are negative.    PHYSICAL EXAMINATION:  VITAL SIGNS:  Blood pressure is 121/81, heart rate is 66 beats per minute and irregular.  The patient is afebrile.  GENERAL:  He is awake and responsive and able to respond appropriately to most questions.  HEENT:  Demonstrates bilateral blindness.  Head is atraumatic, normocephalic.  NECK:  Supple without thyromegaly.  LUNGS:  Demonstrates diminished breath sounds at the right base.  CARDIOVASCULAR:  Reveals a regular rhythm with frequent extrasystoles.  S1 and S2 are normal.  There is a 2/6 ejection-type systolic murmur to the left upper sternal border.  The jugular venous pressure is difficult to assess.  ABDOMEN:  Lean, soft, nontender.  EXTREMITIES:  Demonstrate no pitting pretibial dependent edema.  There is no cyanosis.  There is no evidence for chronic venous stasis.  SKIN:  Otherwise warm and dry without skin rash.    DIAGNOSTIC DATA:  12-lead ECG from 08/13/2024 demonstrates sinus rhythm at 88 beats per minute.  There are frequent PVCs and some PACs with a right bundle-branch block aberration.    IMPRESSIONS:    1. Frequent premature ventricular contractions.  2. Paroxysmal atrial fibrillation.  3. Unspecified cardiomyopathy.  4. Aortic stenosis.  5. Acute on chronic renal insufficiency.  6. Acute on chronic systolic congestive heart failure.    The  patient presents with progressive shortness of breath and cough.  Radiographic evaluation demonstrates right lower lobe pneumonia.  He is known to have abnormal left ventricular function from echo about 1 month ago.  At the time of admission, he has elevated proBNP level, paroxysms of atrial fibrillation, and frequent PVCs.  He is currently undergoing diuresis.  He has not been initiated on guideline-directed medical therapy.  The ambulatory ECG monitor from July 2024 does demonstrate a high PVC burden with numerous PVCs as well.  I would be inclined to concentrate on treatment of the pneumonia followed by initiation of guideline-directed medical therapy including Coreg or metoprolol succinate.  If this does not provide adequate suppression of atrial and ventricular ectopy, I would have a low threshold for initiating amiodarone as this is likely to stabilize both the upper and lower chamber arrhythmias.  An ischemic evaluation is likely necessary as well.    RECOMMENDATIONS:    1. Continuous ECG telemetry monitoring.  2. Effective treatment of pneumonia.  3. Initiation of guideline-directed medical therapy including metoprolol succinate or Coreg.  4. Ischemic evaluation.  5. If initiation of beta blocker does not provide adequate suppression of atrial and ventricular arrhythmias, would have a low threshold for initiation of amiodarone.    Thanks for the opportunity to assist in the care of the patient.  Please contact me if you have any questions regarding his evaluation.          SIRI TRIPATHI MD      D:  08/13/2024 17:12:11     T:  08/13/2024 21:47:22     LEONORA/CORWIN  Job #:  [id number]     Doc#:  [id number]

## 2024-08-14 NOTE — PROGRESS NOTES
[facility]     Electrophysiology                                     Progress Note    Admission date:  2024    Reason for follow up visit: AF and PVC's     HPI/CC: Mani Chirinos was admitted on 2024 with acute on chronic CHF. EP consulted for AF and PVC's. Has also been treated for pneumonia. Rhythm has been sinus with PAC's and PVC's.     Subjective: He is feeling okay. Denies chest pain, palpitations, shortness of breath, and dizziness.       Vitals:  Blood pressure 117/67, pulse 57, temperature 97.8 °F (36.6 °C), temperature source Oral, resp. rate 16, height 1.778 m (5' 10\"), weight 71.8 kg (158 lb 3.2 oz), SpO2 95%.  Temp  Av.9 °F (36.6 °C)  Min: 97.6 °F (36.4 °C)  Max: 98.2 °F (36.8 °C)  Pulse  Av.2  Min: 56  Max: 100  BP  Min: 117/67  Max: 122/70  SpO2  Av.2 %  Min: 93 %  Max: 97 %    24 hour I/O    Intake/Output Summary (Last 24 hours) at 2024 1302  Last data filed at 2024 1256  Gross per 24 hour   Intake 600 ml   Output 2425 ml   Net -1825 ml     Current Facility-Administered Medications   Medication Dose Route Frequency Provider Last Rate Last Admin    calcium carb-cholecalciferol 250-3.125 MG-MCG per tab 1 tablet  1 tablet Oral Daily Checo Auguste MD   1 tablet at 24 0750    pantoprazole (PROTONIX) tablet 40 mg  40 mg Oral QAM AC Checo Auguste MD   40 mg at 24 0750    atorvastatin (LIPITOR) tablet 20 mg  20 mg Oral Nightly Checo Auguste MD   20 mg at 24 0115    sodium chloride flush 0.9 % injection 5-40 mL  5-40 mL IntraVENous 2 times per day Checo Auguste MD   10 mL at 24 0750    sodium chloride flush 0.9 % injection 5-40 mL  5-40 mL IntraVENous PRN Checo Auguste MD        0.9 % sodium chloride infusion   IntraVENous PRN Checo Auguste MD        ondansetron (ZOFRAN-ODT) disintegrating tablet 4 mg  4 mg Oral Q8H PRN Setser, Checo Ramos MD        polyethylene glycol (GLYCOLAX)

## 2024-08-14 NOTE — PLAN OF CARE
CHF Care Plan      Patient's EF (Ejection Fraction) is less than 40%    Heart Failure Medications:  Diuretics:: Furosemide    (One of the following REQUIRED for EF </= 40%/SYSTOLIC FAILURE but MAY be used in EF% >40%/DIASTOLIC FAILURE)        ACE:: None        ARB:: None         ARNI:: None    (Beta Blockers)  NON- Evidenced Based Beta Blocker (for EF% >40%/DIASTOLIC FAILURE): None    Evidenced Based Beta Blocker::(REQUIRED for EF% <40%/SYSTOLIC FAILURE) None  ...................................................................................................................................................    Failed to redirect to the Timeline version of the [employer] SmartLink.      Patient's weights and intake/output reviewed    Daily Weight log at bedside, patient/family participation in use of log: \"yes    Patient's current weight today shows a difference of 4 lbs less than last documented weight.      Intake/Output Summary (Last 24 hours) at 8/14/2024 1057  Last data filed at 8/14/2024 0744  Gross per 24 hour   Intake 360 ml   Output 2425 ml   Net -2065 ml       Education Booklet Provided: yes    Comorbidities Reviewed Yes    Patient has a past medical history of Acid reflux, Asthenia pigmentosa (HCC), BPH (benign prostatic hyperplasia), Cancer (HCC), CHF (congestive heart failure) (HCC), History of blood transfusion, Hyperlipidemia, Hypertension, Irregular heart beat, Kidney stones, Legal blindness, PONV (postoperative nausea and vomiting), and Retinitis.     >>For CHF and Comorbidity documentation on Education Time and Topics, please see Education Tab      Pt resting in bed at this time on room air. Pt denies shortness of breath. Pt without lower extremity edema.     Patient and/or Family's stated Goal of Care this Admission: increase activity tolerance, better understand heart failure and disease management, and be more comfortable prior to discharge        :

## 2024-08-14 NOTE — PROGRESS NOTES
[facility]   Heart Failure Daily Progress Note    Admit Date:  8/12/2024  HPI:    Chief Complaint   Patient presents with    Bradycardia     Recently admitted for pneumonia, CHF, a-fib. Prescribed steroids, lasix and abx not improving.   Had follow up appointment with cardiology and was sent to ED for dyspnea, bradycardia and overall not improving symptoms since last admission.             Mani Chirinos is being followed for shortness of breath.   Sent in from OV with me on 8/12/2024 for worsening shortness of breath despite 6lbs diuresis with PO lasix in the last 1 week and ongoing cough.    Started on lasix infusion on admission for CHF  Being treated for PNA.     Interval history: remains on lasix infusion  Net neg 3.6L this admission     Subjective:  Mr. Chirinos reports breathing is a lot better today.   No chest pain. He had some nausea this afternoon.     Objective:   /63   Pulse 56   Temp 98.2 °F (36.8 °C) (Oral)   Resp 16   Ht 1.778 m (5' 10\")   Wt 71.8 kg (158 lb 3.2 oz)   SpO2 97%   BMI 22.70 kg/m²     Intake/Output Summary (Last 24 hours) at 8/14/2024 1242  Last data filed at 8/14/2024 0744  Gross per 24 hour   Intake 360 ml   Output 2425 ml   Net -2065 ml       NYHA: III    Physical Exam:  General:  Awake, alert, NAD  Skin:  Warm and dry  Neck:  JVD~ 10-12   Chest:  Clear to auscultation, no wheezes/rhonchi/rales  Cardiovascular:  irreg reg  S1S2, no m/r/g   Abdomen:  Soft, nontender, +bowel sounds  Extremities:  no bilateral lower extremity edema    Medications:    calcium carb-cholecalciferol  1 tablet Oral Daily    pantoprazole  40 mg Oral QAM AC    atorvastatin  20 mg Oral Nightly    sodium chloride flush  5-40 mL IntraVENous 2 times per day    cefepime  1,000 mg IntraVENous Q12H      sodium chloride      furosemide 5 mg/hr (08/14/24 1051)    heparin (PORCINE) Infusion 1,230 Units/hr (08/13/24 2248)       Lab Data:  CBC:   Recent Labs     08/12/24  1912

## 2024-08-14 NOTE — FLOWSHEET NOTE
08/14/24 0744   Assessment   Charting Type Shift assessment   Psychosocial   Psychosocial (WDL) WDL   Neurological   Neuro (WDL) WDL   Level of Consciousness 0   [place] Coma Scale   Eye Opening 4   Best Verbal Response 5   Best Motor Response 6   Los Coma Scale Score 15   NIHSS Stroke Scale   NIHSS Stroke Scale Assessed No   HEENT (Head, Ears, Eyes, Nose, & Throat)   HEENT (WDL) X   Right Eye Blind   Left Eye Blind   Respiratory   Respiratory (WDL) WDL   Respiratory Pattern Regular   Respiratory Depth Normal   Respiratory Quality/Effort Dyspnea with exertion   Chest Assessment Chest expansion symmetrical;Trachea midline   L Breath Sounds Diminished   R Breath Sounds Diminished   Cardiac   Cardiac (WDL) X   Cardiac Regularity Irregular   Heart Sounds S1, S2   Cardiac Rhythm Atrial fib   Rhythm Interpretation   Pulse 56   Cardiac Monitor   Telemetry Box Number 2   Gastrointestinal   Abdominal (WDL) WDL   Genitourinary   Genitourinary (WDL) X  (chronic friedman)   Flank Tenderness NOREEN   Suprapubic Tenderness No   Dysuria (Pain/Burning w/Urination) No   Urine Assessment   Urinary Status Frequency   Urine Color Yellow/straw   Peripheral Vascular   Peripheral Vascular (WDL) WDL   Edema None   RLE Neurovascular Assessment   Capillary Refill Less than/Equal to 3 seconds   Color Appropriate for Ethnicity   Temperature Cool   RLE Sensation  Full sensation   R Pedal Pulse +2   LLE Neurovascular Assessment   Capillary Refill Less than/Equal to 3 seconds   Color Appropriate for Ethnicity   Temperature Cool   LLE Sensation  Full sensation   L Pedal Pulse +2   Skin Integumentary    Skin Integumentary (WDL) X  (ABD surgical incision)   Skin Integrity Site 2   Skin Integrity Location 2 Wound (see LDA)   Location 2 abd incision  (healing)   Musculoskeletal   Musculoskeletal (WDL) WDL   Urinary Catheter 08/12/24   Placement Date: 08/12/24   Present on Admission/Arrival: Yes   Catheter Indications Perioperative use for selected

## 2024-08-14 NOTE — PROGRESS NOTES
Ehsan Shabbir, MD    Hospitalist Progress Note      Name:  Mani Chirinos /Age/Sex: 1935  (89 y.o. male)   MRN & CSN:  [id number] & [id number] Admission Date/Time: 2024 12:11 PM   Location:  0423/0423-01 PCP: Zander Ko III, MD       I saw and examined the patient on 2024 at 10:02 AM.    Hospital Day: 3  Barriers to discharge: Stress test in a.m., Lasix gtt., IV antibiotics  Assessment and Plan:     89 y.o. male who presented to [facility] with complaint of worsening shortness of breath particularly with exertion.  Patient was seen in the cardiologist office by Jennifer Anguiano.  Patient was sent to the emergency department for CHF exacerbation. Patient status post recent nephrectomy of the right side due to urothelial carcinoma of the bladder.  Patient not on anticoagulation due to hematuria PTA.  Last echo from  showed EF of 30 to 35%.  Patient recently had been on Levaquin until .  Patient has also been on Keflex. ER work up suggestive of Pneumonia      Acute exacerbation of CHF EF 30 to 35%.  Cardiology following, on Lasix gtt.  Diuretics per cardiology.  For stress test in a.m.      Possible pneumonia. Continue on cefepime..  Clinically improving, will transition to oral antibiotics upon discharge     Hyperlipidemia.   atorvastatin.     TIERA on CKD.  Creatinine elevated at 2.0.  Will consult nephrology.  Previous creatinine 1.4.  Noted nephrology input    History of A-fib.  Currently on heparin gtt. probable plan to start DOAC upon discharge.  Heart rate well-controlled     S/p recent nephrectomy of the right side due to urothelial carcinoma of the bladder.  Now plan to start therapeutic anticoagulation monitor for hematuria  Blind    DVT prophylaxis heparin GTT  Full code       Subjective:   Patient seen and examined at bedside.  Laying comfortably in bed not in acute distress.  No acute events overnight.  Denies any chest pain or palpitations      Objective:

## 2024-08-14 NOTE — CARE COORDINATION
Planning stress test in AM. IPTA. Following for any barriers or needs pertaining to discharge that may arise.

## 2024-08-14 NOTE — PROGRESS NOTES
[facility]  Nephrology follow up Note           Reason for Consult: TIERA on chronic kidney disease stage III  Requesting Physician:  Dr. Connor    Sub/interval history  Feels better than yesterday    Last 24 h uop 2.4l    ROS: No chest pain/shortness of breath/fever/nausea/vomiting  PSFH: No visitor    Scheduled Meds:   calcium carb-cholecalciferol  1 tablet Oral Daily    pantoprazole  40 mg Oral QAM AC    atorvastatin  20 mg Oral Nightly    sodium chloride flush  5-40 mL IntraVENous 2 times per day    cefepime  1,000 mg IntraVENous Q12H     Continuous Infusions:   sodium chloride      furosemide 5 mg/hr (08/13/24 7497)    heparin (PORCINE) Infusion 1,230 Units/hr (08/13/24 1186)     PRN Meds:.sodium chloride flush, sodium chloride, ondansetron, polyethylene glycol, acetaminophen **OR** acetaminophen, perflutren lipid microspheres, heparin (porcine), heparin (porcine)      History of Present Illness on 8/13/24:    89 y.o. 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  He also has had anemia and needed blood transfusions in July along with iron transfusions as well.  Since admission he was started on Lasix drip at 5 mg/h as outpatient management for heart failure did not improve his symptoms despite diuresing.    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 Bourgeois     -Anemia    Plan  -Will defer to cardiology to consider stopping diuretics   -Serial renal panel  -daily wts and strict i/o  -renal dose medications   -avoid nephrotoxins      Thank you for the consultation.  Please do not hesitate to call with questions.    Arina Robles MD  Office: [phone]  Fax:    [phone]  [employer]

## 2024-08-15 ENCOUNTER — TELEPHONE (OUTPATIENT)
Dept: CARDIOLOGY CLINIC | Age: 89
End: 2024-08-15

## 2024-08-15 ENCOUNTER — APPOINTMENT (OUTPATIENT)
Age: 89
DRG: 291 | End: 2024-08-15
Attending: INTERNAL MEDICINE
Payer: MEDICARE

## 2024-08-15 ENCOUNTER — APPOINTMENT (OUTPATIENT)
Dept: NUCLEAR MEDICINE | Age: 89
DRG: 291 | End: 2024-08-15
Payer: MEDICARE

## 2024-08-15 ENCOUNTER — HOSPITAL ENCOUNTER (INPATIENT)
Age: 89
Discharge: HOME | DRG: 291 | End: 2024-08-17
Payer: MEDICARE

## 2024-08-15 LAB
ANION GAP SERPL CALCULATED.3IONS-SCNC: 15 MMOL/L (ref 3–16)
ANTI-XA UNFRAC HEPARIN: 0.37 IU/ML (ref 0.3–0.7)
BUN SERPL-MCNC: 29 MG/DL (ref 7–20)
CALCIUM SERPL-MCNC: 9.1 MG/DL (ref 8.3–10.6)
CHLORIDE SERPL-SCNC: 95 MMOL/L (ref 99–110)
CO2 SERPL-SCNC: 25 MMOL/L (ref 21–32)
CREAT SERPL-MCNC: 2.1 MG/DL (ref 0.8–1.3)
ECHO AO ROOT DIAM: 3.5 CM
ECHO AO ROOT INDEX: 1.84 CM/M2
ECHO AV AREA PEAK VELOCITY: 3.2 CM2
ECHO AV AREA/BSA PEAK VELOCITY: 1.7 CM2/M2
ECHO AV PEAK GRADIENT: 9 MMHG
ECHO AV PEAK VELOCITY: 1.5 M/S
ECHO AV VELOCITY RATIO: 0.73
ECHO BSA: 1.95 M2
ECHO BSA: 1.95 M2
ECHO EST RA PRESSURE: 3 MMHG
ECHO IVC PROX: 1.8 CM
ECHO LA AREA 2C: 20 CM2
ECHO LA AREA 4C: 17.5 CM2
ECHO LA DIAMETER INDEX: 1.79 CM/M2
ECHO LA DIAMETER: 3.4 CM
ECHO LA MAJOR AXIS: 5.5 CM
ECHO LA MINOR AXIS: 5.8 CM
ECHO LA TO AORTIC ROOT RATIO: 0.97
ECHO LA VOL BP: 50 ML (ref 18–58)
ECHO LA VOL MOD A2C: 56 ML (ref 18–58)
ECHO LA VOL MOD A4C: 43 ML (ref 18–58)
ECHO LA VOL/BSA BIPLANE: 26 ML/M2 (ref 16–34)
ECHO LA VOLUME INDEX MOD A2C: 29 ML/M2 (ref 16–34)
ECHO LA VOLUME INDEX MOD A4C: 23 ML/M2 (ref 16–34)
ECHO LV E' SEPTAL VELOCITY: 4 CM/S
ECHO LV EDV A2C: 131 ML
ECHO LV EDV A4C: 183 ML
ECHO LV EDV INDEX A4C: 96 ML/M2
ECHO LV EDV NDEX A2C: 69 ML/M2
ECHO LV EJECTION FRACTION A2C: 44 %
ECHO LV EJECTION FRACTION A4C: 32 %
ECHO LV EJECTION FRACTION BIPLANE: 35 % (ref 55–100)
ECHO LV ESV A2C: 73 ML
ECHO LV ESV A4C: 125 ML
ECHO LV ESV INDEX A2C: 38 ML/M2
ECHO LV ESV INDEX A4C: 66 ML/M2
ECHO LV FRACTIONAL SHORTENING: 18 % (ref 28–44)
ECHO LV INTERNAL DIMENSION DIASTOLE INDEX: 3.26 CM/M2
ECHO LV INTERNAL DIMENSION DIASTOLIC: 6.2 CM (ref 4.2–5.9)
ECHO LV INTERNAL DIMENSION SYSTOLIC INDEX: 2.68 CM/M2
ECHO LV INTERNAL DIMENSION SYSTOLIC: 5.1 CM
ECHO LV IVSD: 0.8 CM (ref 0.6–1)
ECHO LV MASS 2D: 212.5 G (ref 88–224)
ECHO LV MASS INDEX 2D: 111.9 G/M2 (ref 49–115)
ECHO LV POSTERIOR WALL DIASTOLIC: 0.9 CM (ref 0.6–1)
ECHO LV RELATIVE WALL THICKNESS RATIO: 0.29
ECHO LVOT AREA: 4.2 CM2
ECHO LVOT DIAM: 2.3 CM
ECHO LVOT MEAN GRADIENT: 3 MMHG
ECHO LVOT PEAK GRADIENT: 5 MMHG
ECHO LVOT PEAK VELOCITY: 1.1 M/S
ECHO LVOT STROKE VOLUME INDEX: 40.2 ML/M2
ECHO LVOT SV: 76.4 ML
ECHO LVOT VTI: 18.4 CM
ECHO MV E VELOCITY: 0.73 M/S
ECHO MV E/E' SEPTAL: 18.25
ECHO RA AREA 4C: 18.5 CM2
ECHO RA END SYSTOLIC VOLUME APICAL 4 CHAMBER INDEX BSA: 28 ML/M2
ECHO RA VOLUME: 53 ML
ECHO RV FREE WALL PEAK S': 20 CM/S
ECHO RV INTERNAL DIMENSION: 4 CM
ECHO RV TAPSE: 2 CM (ref 1.7–?)
EKG ATRIAL RATE: 79 BPM
EKG DIAGNOSIS: NORMAL
EKG P AXIS: 115 DEGREES
EKG Q-T INTERVAL: 436 MS
EKG QRS DURATION: 96 MS
EKG QTC CALCULATION (BAZETT): 499 MS
EKG R AXIS: -14 DEGREES
EKG T AXIS: 58 DEGREES
EKG VENTRICULAR RATE: 79 BPM
GFR SERPLBLD CREATININE-BSD FMLA CKD-EPI: 30 ML/MIN/{1.73_M2}
GLUCOSE SERPL-MCNC: 111 MG/DL (ref 70–99)
MAGNESIUM SERPL-MCNC: 2 MG/DL (ref 1.8–2.4)
NT-PROBNP SERPL-MCNC: 3737 PG/ML (ref 0–449)
NUC STRESS EJECTION FRACTION: 32 %
NUC STRESS LV EDV: 153 ML (ref 67–155)
NUC STRESS LV ESV: 104 ML (ref 22–58)
NUC STRESS LV MASS: 173 G
POTASSIUM SERPL-SCNC: 3.5 MMOL/L (ref 3.5–5.1)
SODIUM SERPL-SCNC: 135 MMOL/L (ref 136–145)
STRESS BASELINE DIAS BP: 77 MMHG
STRESS BASELINE HR: 94 BPM
STRESS BASELINE SYS BP: 123 MMHG
STRESS ESTIMATED WORKLOAD: 1 METS
STRESS PEAK DIAS BP: 72 MMHG
STRESS PEAK SYS BP: 138 MMHG
STRESS PERCENT HR ACHIEVED: 93 %
STRESS POST PEAK HR: 123 BPM
STRESS RATE PRESSURE PRODUCT: ABNORMAL BPM*MMHG
STRESS STAGE 1 BP: ABNORMAL MMHG
STRESS STAGE 1 DURATION: 1 MIN:SEC
STRESS STAGE 1 HR: 96 BPM
STRESS STAGE RECOVERY 1 BP: ABNORMAL MMHG
STRESS STAGE RECOVERY 1 DURATION: 1 MIN:SEC
STRESS STAGE RECOVERY 1 HR: 93 BPM
STRESS STAGE RECOVERY 2 BP: ABNORMAL MMHG
STRESS STAGE RECOVERY 2 DURATION: 4 MIN:SEC
STRESS STAGE RECOVERY 2 HR: 98 BPM
STRESS STAGE RECOVERY 3 BP: ABNORMAL MMHG
STRESS STAGE RECOVERY 3 DURATION: 6 MIN:SEC
STRESS STAGE RECOVERY 3 HR: 113 BPM
STRESS STAGE RECOVERY 4 BP: ABNORMAL MMHG
STRESS STAGE RECOVERY 4 DURATION: 8 MIN:SEC
STRESS STAGE RECOVERY 4 HR: 99 BPM
STRESS TARGET HR: 132 BPM
TID: 0.86

## 2024-08-15 PROCEDURE — A9502 TC99M TETROFOSMIN: HCPCS | Performed by: NURSE PRACTITIONER

## 2024-08-15 PROCEDURE — 78452 HT MUSCLE IMAGE SPECT MULT: CPT

## 2024-08-15 PROCEDURE — 93005 ELECTROCARDIOGRAM TRACING: CPT | Performed by: INTERNAL MEDICINE

## 2024-08-15 PROCEDURE — 97116 GAIT TRAINING THERAPY: CPT

## 2024-08-15 PROCEDURE — 99232 SBSQ HOSP IP/OBS MODERATE 35: CPT | Performed by: NURSE PRACTITIONER

## 2024-08-15 PROCEDURE — 93010 ELECTROCARDIOGRAM REPORT: CPT | Performed by: INTERNAL MEDICINE

## 2024-08-15 PROCEDURE — 6360000002 HC RX W HCPCS: Performed by: NURSE PRACTITIONER

## 2024-08-15 PROCEDURE — 93018 CV STRESS TEST I&R ONLY: CPT | Performed by: INTERNAL MEDICINE

## 2024-08-15 PROCEDURE — 93016 CV STRESS TEST SUPVJ ONLY: CPT | Performed by: INTERNAL MEDICINE

## 2024-08-15 PROCEDURE — 2060000000 HC ICU INTERMEDIATE R&B

## 2024-08-15 PROCEDURE — 80048 BASIC METABOLIC PNL TOTAL CA: CPT

## 2024-08-15 PROCEDURE — 6360000004 HC RX CONTRAST MEDICATION: Performed by: INTERNAL MEDICINE

## 2024-08-15 PROCEDURE — 2580000003 HC RX 258: Performed by: NURSE PRACTITIONER

## 2024-08-15 PROCEDURE — 97166 OT EVAL MOD COMPLEX 45 MIN: CPT

## 2024-08-15 PROCEDURE — 3430000000 HC RX DIAGNOSTIC RADIOPHARMACEUTICAL: Performed by: NURSE PRACTITIONER

## 2024-08-15 PROCEDURE — 6370000000 HC RX 637 (ALT 250 FOR IP): Performed by: NURSE PRACTITIONER

## 2024-08-15 PROCEDURE — 93017 CV STRESS TEST TRACING ONLY: CPT

## 2024-08-15 PROCEDURE — 85520 HEPARIN ASSAY: CPT

## 2024-08-15 PROCEDURE — 93306 TTE W/DOPPLER COMPLETE: CPT | Performed by: INTERNAL MEDICINE

## 2024-08-15 PROCEDURE — 83735 ASSAY OF MAGNESIUM: CPT

## 2024-08-15 PROCEDURE — 83880 ASSAY OF NATRIURETIC PEPTIDE: CPT

## 2024-08-15 PROCEDURE — 97530 THERAPEUTIC ACTIVITIES: CPT

## 2024-08-15 PROCEDURE — 2580000003 HC RX 258: Performed by: INTERNAL MEDICINE

## 2024-08-15 PROCEDURE — 78452 HT MUSCLE IMAGE SPECT MULT: CPT | Performed by: INTERNAL MEDICINE

## 2024-08-15 PROCEDURE — 6360000002 HC RX W HCPCS: Performed by: INTERNAL MEDICINE

## 2024-08-15 PROCEDURE — 6370000000 HC RX 637 (ALT 250 FOR IP): Performed by: INTERNAL MEDICINE

## 2024-08-15 PROCEDURE — 97162 PT EVAL MOD COMPLEX 30 MIN: CPT

## 2024-08-15 PROCEDURE — 36415 COLL VENOUS BLD VENIPUNCTURE: CPT

## 2024-08-15 PROCEDURE — C8929 TTE W OR WO FOL WCON,DOPPLER: HCPCS

## 2024-08-15 RX ORDER — AMINOPHYLLINE 25 MG/ML
100 INJECTION, SOLUTION INTRAVENOUS ONCE
Status: COMPLETED | OUTPATIENT
Start: 2024-08-15 | End: 2024-08-15

## 2024-08-15 RX ORDER — REGADENOSON 0.08 MG/ML
0.4 INJECTION, SOLUTION INTRAVENOUS
Status: COMPLETED | OUTPATIENT
Start: 2024-08-15 | End: 2024-08-15

## 2024-08-15 RX ADMIN — SODIUM CHLORIDE, PRESERVATIVE FREE 10 ML: 5 INJECTION INTRAVENOUS at 14:25

## 2024-08-15 RX ADMIN — FUROSEMIDE 5 MG/HR: 10 INJECTION, SOLUTION INTRAMUSCULAR; INTRAVENOUS at 05:39

## 2024-08-15 RX ADMIN — CEFEPIME 1000 MG: 1 INJECTION, POWDER, FOR SOLUTION INTRAMUSCULAR; INTRAVENOUS at 04:39

## 2024-08-15 RX ADMIN — CEFEPIME 1000 MG: 1 INJECTION, POWDER, FOR SOLUTION INTRAMUSCULAR; INTRAVENOUS at 14:32

## 2024-08-15 RX ADMIN — METOPROLOL SUCCINATE 25 MG: 25 TABLET, EXTENDED RELEASE ORAL at 08:21

## 2024-08-15 RX ADMIN — TETROFOSMIN 10.9 MILLICURIE: 1.38 INJECTION, POWDER, LYOPHILIZED, FOR SOLUTION INTRAVENOUS at 08:37

## 2024-08-15 RX ADMIN — HEPARIN SODIUM 1230 UNITS/HR: 10000 INJECTION, SOLUTION INTRAVENOUS at 18:51

## 2024-08-15 RX ADMIN — PANTOPRAZOLE SODIUM 40 MG: 40 TABLET, DELAYED RELEASE ORAL at 14:25

## 2024-08-15 RX ADMIN — TETROFOSMIN 31 MILLICURIE: 1.38 INJECTION, POWDER, LYOPHILIZED, FOR SOLUTION INTRAVENOUS at 10:30

## 2024-08-15 RX ADMIN — ATORVASTATIN CALCIUM 20 MG: 10 TABLET, FILM COATED ORAL at 20:13

## 2024-08-15 RX ADMIN — Medication 1 TABLET: at 14:25

## 2024-08-15 RX ADMIN — AMINOPHYLLINE 100 MG: 25 INJECTION, SOLUTION INTRAVENOUS at 10:36

## 2024-08-15 RX ADMIN — SODIUM CHLORIDE, PRESERVATIVE FREE 10 ML: 5 INJECTION INTRAVENOUS at 20:14

## 2024-08-15 RX ADMIN — REGADENOSON 0.4 MG: 0.08 INJECTION, SOLUTION INTRAVENOUS at 10:30

## 2024-08-15 RX ADMIN — PERFLUTREN 1.5 ML: 6.52 INJECTION, SUSPENSION INTRAVENOUS at 11:08

## 2024-08-15 ASSESSMENT — PAIN SCALES - GENERAL
PAINLEVEL_OUTOF10: 0

## 2024-08-15 NOTE — TELEPHONE ENCOUNTER
Mani Chirinos [birth year] , Rm- 423, Rn- Gail P- [phone], Reason- pt had stress test and rn would like to know if pt can eat now

## 2024-08-15 NOTE — PLAN OF CARE
CHF Care Plan      Patient's EF (Ejection Fraction) is less than 40%    Heart Failure Medications:  Diuretics:: Furosemide    (One of the following REQUIRED for EF </= 40%/SYSTOLIC FAILURE but MAY be used in EF% >40%/DIASTOLIC FAILURE)        ACE:: None        ARB:: None         ARNI:: None    (Beta Blockers)  NON- Evidenced Based Beta Blocker (for EF% >40%/DIASTOLIC FAILURE): None    Evidenced Based Beta Blocker::(REQUIRED for EF% <40%/SYSTOLIC FAILURE) None  ...................................................................................................................................................    Failed to redirect to the Timeline version of the [employer] SmartLink.      Patient's weights and intake/output reviewed    Daily Weight log at bedside, patient/family participation in use of log: \"yes    Patient's current weight today shows a difference of 2 lbs more than last documented weight.      Intake/Output Summary (Last 24 hours) at 8/15/2024 0738  Last data filed at 8/15/2024 0433  Gross per 24 hour   Intake 360 ml   Output 1970 ml   Net -1610 ml       Education Booklet Provided: yes    Comorbidities Reviewed Yes    Patient has a past medical history of Acid reflux, Asthenia pigmentosa (HCC), BPH (benign prostatic hyperplasia), Cancer (HCC), CHF (congestive heart failure) (HCC), History of blood transfusion, Hyperlipidemia, Hypertension, Irregular heart beat, Kidney stones, Legal blindness, PONV (postoperative nausea and vomiting), and Retinitis.     >>For CHF and Comorbidity documentation on Education Time and Topics, please see Education Tab      Pt resting in bed at this time on room air. Pt denies shortness of breath. Pt without lower extremity edema.     Patient and/or Family's stated Goal of Care this Admission: reduce shortness of breath, increase activity tolerance, better understand heart failure and disease management, be more comfortable, and reduce lower extremity edema prior to

## 2024-08-15 NOTE — PROGRESS NOTES
[facility]     Electrophysiology                                     Progress Note    Admission date:  2024    Reason for follow up visit: AF and PVC's     HPI/CC: Mani Chirinos was admitted on 2024 with acute on chronic CHF. EP consulted for AF and PVC's. Has also been treated for pneumonia. Rhythm has been sinus with PAC's and PVC's and brief NSVT.     Subjective: Denies chest pain, palpitations, shortness of breath, and dizziness. He is unaware of extra beats.       Vitals:  Blood pressure (!) 156/53, pulse 62, temperature 97.9 °F (36.6 °C), temperature source Oral, resp. rate 18, height 1.778 m (5' 10\"), weight 72.6 kg (160 lb), SpO2 95%.  Temp  Av °F (36.7 °C)  Min: 97.5 °F (36.4 °C)  Max: 98.6 °F (37 °C)  Pulse  Av  Min: 53  Max: 68  BP  Min: 113/69  Max: 156/53  SpO2  Av.7 %  Min: 93 %  Max: 96 %    24 hour I/O    Intake/Output Summary (Last 24 hours) at 8/15/2024 1255  Last data filed at 8/15/2024 0433  Gross per 24 hour   Intake 240 ml   Output 1970 ml   Net -1730 ml     Current Facility-Administered Medications   Medication Dose Route Frequency Provider Last Rate Last Admin    metoprolol succinate (TOPROL XL) extended release tablet 25 mg  25 mg Oral Daily Alyson Suárez, AVINASH - CNP   25 mg at 08/15/24 0821    calcium carb-cholecalciferol 250-3.125 MG-MCG per tab 1 tablet  1 tablet Oral Daily Checo Auguste MD   1 tablet at 24 0750    pantoprazole (PROTONIX) tablet 40 mg  40 mg Oral QAM AC Checo Auguste MD   40 mg at 24 0750    atorvastatin (LIPITOR) tablet 20 mg  20 mg Oral Nightly Checo Auguste MD   20 mg at 24 2219    sodium chloride flush 0.9 % injection 5-40 mL  5-40 mL IntraVENous 2 times per day Checo Auguste MD   10 mL at 24 0750    sodium chloride flush 0.9 % injection 5-40 mL  5-40 mL IntraVENous PRN SetsCheco chris MD        0.9 % sodium chloride infusion   IntraVENous PRN Checo Auguste

## 2024-08-15 NOTE — PROGRESS NOTES
Noticed patient had run of VT on the tele monitor just before 0700. Went to check on patient who appeared asymptomatic. Obtained EKG and messaged on-call Cardiology office with results and whether this would impact patient's ability to begin his stress test today. Message was read but no response afterwards.

## 2024-08-15 NOTE — PROGRESS NOTES
[facility]  Nephrology follow up Note           Reason for Consult: TIERA on chronic kidney disease stage III  Requesting Physician:  Dr. Connor    Sub/interval history  Resting  Lasix drip was stopped on 8/15 a.m.    Last 24 h uop 1.9 l    ROS: No chest pain/shortness of breath/fever/nausea/vomiting  PSFH: No visitor    Scheduled Meds:   metoprolol succinate  25 mg Oral Daily    calcium carb-cholecalciferol  1 tablet Oral Daily    pantoprazole  40 mg Oral QAM AC    atorvastatin  20 mg Oral Nightly    sodium chloride flush  5-40 mL IntraVENous 2 times per day    cefepime  1,000 mg IntraVENous Q12H     Continuous Infusions:   sodium chloride      heparin (PORCINE) Infusion 1,230 Units/hr (08/14/24 1943)     PRN Meds:.sodium chloride flush, sodium chloride, ondansetron, polyethylene glycol, acetaminophen **OR** acetaminophen, perflutren lipid microspheres, heparin (porcine), heparin (porcine)      History of Present Illness on 8/13/24:    89 y.o. 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  He also has had anemia and needed blood transfusions in July along with iron transfusions as well.  Since admission he was started on Lasix drip at 5 mg/h as outpatient management for heart failure did not improve his symptoms despite diuresing.    Physical exam:   Constitutional:  VITALS:  BP (!) 156/53   Pulse 62   Temp 97.9 °F (36.6 °C) (Oral)   Resp 18   Ht 1.778 m (5' 10\")   Wt 72.6 kg (160 lb)   SpO2 95%   BMI 22.96 kg/m²   Gen: alert, awake  Neck: No JVD  Skin:

## 2024-08-15 NOTE — PROGRESS NOTES
Ehsan Shabbir, MD    Hospitalist Progress Note      Name:  Mani Chirinos /Age/Sex: 1935  (89 y.o. male)   MRN & CSN:  [id number] & [id number] Admission Date/Time: 2024 12:11 PM   Location:  0423/0423-01 PCP: Zander Ko III, MD       I saw and examined the patient on 8/15/2024 at 5:37 PM.    Hospital Day: 4  Barriers to discharge: pending testing   Assessment and Plan:     89 y.o. male who presented to [facility] with complaint of worsening shortness of breath particularly with exertion.  Patient was seen in the cardiologist office by Jennifer Anguiano.  Patient was sent to the emergency department for CHF exacerbation. Patient status post recent nephrectomy of the right side due to urothelial carcinoma of the bladder.  Patient not on anticoagulation due to hematuria PTA.  Last echo from  showed EF of 30 to 35%.  Patient recently had been on Levaquin until .  Patient has also been on Keflex. ER work up suggestive of Pneumonia      Acute exacerbation of CHF EF 30 to 35%.  Cardiology following, on Lasix gtt now DC.    Diuretics per cardiology.  For stress test today and pending       Possible pneumonia. Continue on cefepime..  Clinically improving, will transition to oral antibiotics upon discharge     Hyperlipidemia.   atorvastatin.     TIERA on CKD.  Creatinine elevated at 2.0.  Will consult nephrology.  Previous creatinine 1.4.  Noted nephrology input    History of A-fib.  Currently on heparin gtt. probable plan to start DOAC upon discharge.  Heart rate well-controlled. Appreciate EP input      S/p recent nephrectomy of the right side due to urothelial carcinoma of the bladder.  Now plan to start therapeutic anticoagulation monitor for hematuria  Blind    DVT prophylaxis heparin GTT  Full code    PT/OT with recs for Home Health        Subjective:   Patient seen and examined at bedside.  Laying comfortably in bed not in acute distress.  No acute events overnight.  Denies any

## 2024-08-15 NOTE — PROGRESS NOTES
[facility]   Heart Failure Daily Progress Note    Admit Date:  8/12/2024  HPI:    Chief Complaint   Patient presents with    Bradycardia     Recently admitted for pneumonia, CHF, a-fib. Prescribed steroids, lasix and abx not improving.   Had follow up appointment with cardiology and was sent to ED for dyspnea, bradycardia and overall not improving symptoms since last admission.             Mani Chirinos is being followed for shortness of breath.   Sent in from [facility] with me on 8/12/2024 for worsening shortness of breath despite 6lbs diuresis with PO lasix in the last 1 week and ongoing cough.    Started on lasix infusion on admission for CHF  Being treated for PNA.     Interval history: lasix infusion stopped this am   Net neg 5.2L this admission     Subjective:  Mr. Chirinos seen in the stress lab. Breathing is improving each day ; cough is improving too.   No chest pain    Objective:   /77   Pulse 53   Temp 97.5 °F (36.4 °C) (Oral)   Resp 18   Ht 1.778 m (5' 10\")   Wt 72.6 kg (160 lb)   SpO2 96%   BMI 22.96 kg/m²     Intake/Output Summary (Last 24 hours) at 8/15/2024 0710  Last data filed at 8/15/2024 0433  Gross per 24 hour   Intake 360 ml   Output 1970 ml   Net -1610 ml       NYHA: III    Physical Exam:  General:  Awake, alert, NAD  Skin:  Warm and dry  Neck:  JVD~ 8-10  Chest:  Clear to auscultation, no wheezes/rhonchi/rales  Cardiovascular:  irreg reg  S1S2, no m/r/g   Abdomen:  Soft, nontender, +bowel sounds  Extremities:  no bilateral lower extremity edema    Medications:    metoprolol succinate  25 mg Oral Daily    calcium carb-cholecalciferol  1 tablet Oral Daily    pantoprazole  40 mg Oral QAM AC    atorvastatin  20 mg Oral Nightly    sodium chloride flush  5-40 mL IntraVENous 2 times per day    cefepime  1,000 mg IntraVENous Q12H      sodium chloride      heparin (PORCINE) Infusion 1,230 Units/hr (08/14/24 1943)       Lab Data:  CBC:   Recent Labs     08/12/24 1912  08/13/24  0203 08/14/24  0502   WBC 8.4 8.2 7.5   HGB 13.4* 12.6* 13.1*   * 139 153     BMP:    Recent Labs     08/13/24  0203 08/14/24  0502 08/15/24  0508    137 135*   K 3.8 3.5 3.5   CO2 24 25 25   BUN 26* 27* 29*   CREATININE 2.1* 2.0* 2.1*     INR:    Recent Labs     08/12/24  1912   INR 1.18*     BNP:    Recent Labs     08/12/24  1247 08/15/24  0508   PROBNP 11,668* 3,737*     No results found for: \"LVEF\", \"LVEFMODE\"    Testing:    Echo 7/8/24    Image quality is technically difficult. Technically difficult study.    Left Ventricle: Moderately reduced left ventricular systolic function with a visually estimated EF of 30 - 35%. Left ventricle is dilated. Mildly increased wall thickness. Findings consistent with mild concentric hypertrophy. Global hypokinesis present with regional variation.    Aortic Valve: Mild regurgitation. Mild stenosis of the aortic valve. AV mean gradient is 10 mmHg. AV peak gradient is 16 mmHg. AV peak velocity is 2.0 m/s. AV area by continuity VTI is 2.6 cm2.    Mitral Valve: Mild regurgitation.    Tricuspid Valve: Moderately elevated RVSP, consistent with moderate pulmonary hypertension.    Right Atrium: Right atrium is mildly dilated.      08/14/24 0500 71.8 kg (158 lb 3.2 oz) Bed scale      08/13/24 1630 73.8 kg (162 lb 11.2 oz) Bed scale     08/13/24 0543 -- Standing scale     08/12/24 1628 77.1 kg (170 lb) --     08/12/24 1219 73 kg (161 lb) Stated       Weight on 8/1/2024 167lbs      Principal Problem:    CHF (congestive heart failure), NYHA class I, acute on chronic, combined (HCC)  Active Problems:    PAF (paroxysmal atrial fibrillation) ([facility])    NSVT (nonsustained ventricular tachycardia) ([facility])    Acute HFrEF (heart failure with reduced ejection fraction) ([facility])    Acute decompensated heart failure (HCC)    TIERA (acute kidney injury) ([facility])    Cardiomyopathy ([facility])    PVC (premature ventricular contraction)  Resolved Problems:    * No resolved hospital problems.

## 2024-08-15 NOTE — PROGRESS NOTES
Physical Therapy  Facility/Department: [facility]  Physical Therapy Initial Assessment/treatment    Name: Mani Chirinos  : 1935  MRN: [id number]  Date of Service: 8/15/2024    Discharge Recommendations:  24 hour supervision or assist, Home with Home health PT   PT Equipment Recommendations  Equipment Needed: No      Patient Diagnosis(es): The primary encounter diagnosis was Acute on chronic congestive heart failure, unspecified heart failure type (HCC). Diagnoses of Atrial fibrillation, unspecified type (HCC), Pneumonia due to infectious organism, unspecified laterality, unspecified part of lung, Shortness of breath, and Cardiomyopathy, unspecified type (HCC) were also pertinent to this visit.  Past Medical History:  has a past medical history of Acid reflux, Asthenia pigmentosa (HCC), BPH (benign prostatic hyperplasia), Cancer (HCC), CHF (congestive heart failure) (HCC), History of blood transfusion, Hyperlipidemia, Hypertension, Irregular heart beat, Kidney stones, Legal blindness, PONV (postoperative nausea and vomiting), and Retinitis.  Past Surgical History:  has a past surgical history that includes hernia repair (Right); Cholecystectomy; back surgery; TURP; eye surgery (Bilateral); other surgical history (N/A, 10/14/2016); Cystoscopy (N/A, 2024); and Kidney removal (Right, 2024).    Assessment   Body Structures, Functions, Activity Limitations Requiring Skilled Therapeutic Intervention: Decreased functional mobility ;Decreased endurance;Decreased balance;Decreased strength  Assessment: Pt presents to [facility] due to shortness of breath. PTA, pt lives with spouse and performs functional mobility IND with no AD within home. Pt is blind at baseline and wife assist with mobility in community or unfamiliar environments. Currently, pt functioning below baseline level and requires SBA for bed mobility and min(A)x2 for ambulation due to line management and vision and balance. Pt demonstrates  Constant support;Fair  Transfer Training  Transfer Training: Yes  Interventions: Safety awareness training;Tactile cues;Verbal cues  Sit to Stand: Contact-guard assistance  Stand to Sit: Contact-guard assistance  Gait  Gait Training: Yes  Overall Level of Assistance: Minimum assistance;Assist X2  Distance (ft): 15 Feet  Assistive Device: Gait belt (B HHA)  Interventions: Safety awareness training;Verbal cues;Manual cues  Base of Support: Narrowed  Speed/Mya: Slow  Step Length: Right shortened;Left shortened  Gait Abnormalities:  (tactile and VC for pathfinding; Ax2 for line management and balance)    -PAC - Mobility    AM-PAC Basic Mobility - Inpatient   How much help is needed turning from your back to your side while in a flat bed without using bedrails?: A Little  How much help is needed moving from lying on your back to sitting on the side of a flat bed without using bedrails?: A Little  How much help is needed moving to and from a bed to a chair?: A Little  How much help is needed standing up from a chair using your arms?: A Little  How much help is needed walking in hospital room?: A Little  How much help is needed climbing 3-5 steps with a railing?: A Little  [facility] Inpatient Mobility Raw Score : 18  AM-PAC Inpatient T-Scale Score : 43.63  Mobility Inpatient CMS 0-100% Score: 46.58  Mobility Inpatient CMS G-Code Modifier : CK    Goals  Short Term Goals  Time Frame for Short Term Goals: 8/22/24 (7 days) unless otherwise stated  Short Term Goal 1: Pt will perform supine <> sit with supervision  Short Term Goal 2: Pt will perform all transfers with LRAD and SBA  Short Term Goal 3: Pt will ambulate 50ft with LRAD and SBA  Short Term Goal 4: Pt will perform 10 reps of BLE exercises by 8/18  Short Term Goal 5: Pt and/or family will meet 3/5 PT CHF goals  Patient Goals   Patient Goals : \"to go home\"       Education  Patient Education  Education Given To: Patient  Education Provided: Role of Therapy;Plan of

## 2024-08-15 NOTE — PLAN OF CARE
CHF Care Plan      Patient's EF (Ejection Fraction) is less than 40%    Heart Failure Medications:  Diuretics:: None    (One of the following REQUIRED for EF </= 40%/SYSTOLIC FAILURE but MAY be used in EF% >40%/DIASTOLIC FAILURE)        ACE:: None        ARB:: None         ARNI:: Sacubitril/Valsartan-Entresto    (Beta Blockers)  NON- Evidenced Based Beta Blocker (for EF% >40%/DIASTOLIC FAILURE): None    Evidenced Based Beta Blocker::(REQUIRED for EF% <40%/SYSTOLIC FAILURE) None  ...................................................................................................................................................    Failed to redirect to the Timeline version of the [employer] SmartLink.      Patient's weights and intake/output reviewed    Daily Weight log at bedside, patient/family participation in use of log:  blind    Patient's current weight today shows a difference of 2 lbs more than last documented weight.      Intake/Output Summary (Last 24 hours) at 8/15/2024 1444  Last data filed at 8/15/2024 0800  Gross per 24 hour   Intake 0 ml   Output 1970 ml   Net -1970 ml       Education Booklet Provided: no    Comorbidities Reviewed No    Patient has a past medical history of Acid reflux, Asthenia pigmentosa (HCC), BPH (benign prostatic hyperplasia), Cancer (HCC), CHF (congestive heart failure) (HCC), History of blood transfusion, Hyperlipidemia, Hypertension, Irregular heart beat, Kidney stones, Legal blindness, PONV (postoperative nausea and vomiting), and Retinitis.     >>For CHF and Comorbidity documentation on Education Time and Topics, please see Education Tab      Pt sitting in bed at this time on room air. Pt denies shortness of breath. Pt with nonpitting lower extremity edema.     Patient and/or Family's stated Goal of Care this Admission: better understand heart failure and disease management prior to discharge        :

## 2024-08-15 NOTE — PROGRESS NOTES
Occupational Therapy  Facility/Department: [facility]  Occupational Therapy Initial Assessment and Treatment Note    Name: Mani Chirinos  : 1935  MRN: [id number]  Date of Service: 8/15/2024    Discharge Recommendations:  24 hour supervision or assist, Home with Home health OT  OT Equipment Recommendations  Equipment Needed: No     If pt is unable to be seen after this session, please let this note serve as discharge summary.  Please see case management note for discharge disposition.  Thank you.    Patient Diagnosis(es): The primary encounter diagnosis was Acute on chronic congestive heart failure, unspecified heart failure type (HCC). Diagnoses of Atrial fibrillation, unspecified type (HCC), Pneumonia due to infectious organism, unspecified laterality, unspecified part of lung, Shortness of breath, and Cardiomyopathy, unspecified type (HCC) were also pertinent to this visit.  Past Medical History:  has a past medical history of Acid reflux, Asthenia pigmentosa (HCC), BPH (benign prostatic hyperplasia), Cancer (HCC), CHF (congestive heart failure) (HCC), History of blood transfusion, Hyperlipidemia, Hypertension, Irregular heart beat, Kidney stones, Legal blindness, PONV (postoperative nausea and vomiting), and Retinitis.  Past Surgical History:  has a past surgical history that includes hernia repair (Right); Cholecystectomy; back surgery; TURP; eye surgery (Bilateral); other surgical history (N/A, 10/14/2016); Cystoscopy (N/A, 2024); and Kidney removal (Right, 2024).       Assessment   Performance deficits / Impairments: Decreased functional mobility ;Decreased ADL status;Decreased strength;Decreased endurance;Decreased balance;Decreased posture  Assessment: Pt was admitted to [facility] for CHF exacerbation. At baseline, pt lives with his wife and reports IND with ADLs (set-up for dressing), funcitonal transfers, and functional mobility with rollator PRN. He is legally blind (wife assists with

## 2024-08-16 LAB
ANION GAP SERPL CALCULATED.3IONS-SCNC: 11 MMOL/L (ref 3–16)
ANTI-XA UNFRAC HEPARIN: 0.62 IU/ML (ref 0.3–0.7)
BACTERIA BLD CULT ORG #2: NORMAL
BACTERIA BLD CULT: NORMAL
BUN SERPL-MCNC: 32 MG/DL (ref 7–20)
CALCIUM SERPL-MCNC: 9.2 MG/DL (ref 8.3–10.6)
CHLORIDE SERPL-SCNC: 96 MMOL/L (ref 99–110)
CO2 SERPL-SCNC: 28 MMOL/L (ref 21–32)
CREAT SERPL-MCNC: 1.9 MG/DL (ref 0.8–1.3)
DEPRECATED RDW RBC AUTO: 18.5 % (ref 12.4–15.4)
GFR SERPLBLD CREATININE-BSD FMLA CKD-EPI: 33 ML/MIN/{1.73_M2}
GLUCOSE SERPL-MCNC: 109 MG/DL (ref 70–99)
HCT VFR BLD AUTO: 39 % (ref 40.5–52.5)
HGB BLD-MCNC: 12.5 G/DL (ref 13.5–17.5)
MAGNESIUM SERPL-MCNC: 2.1 MG/DL (ref 1.8–2.4)
MCH RBC QN AUTO: 26.2 PG (ref 26–34)
MCHC RBC AUTO-ENTMCNC: 31.9 G/DL (ref 31–36)
MCV RBC AUTO: 82 FL (ref 80–100)
PLATELET # BLD AUTO: 182 K/UL (ref 135–450)
PMV BLD AUTO: 8.4 FL (ref 5–10.5)
POTASSIUM SERPL-SCNC: 3.3 MMOL/L (ref 3.5–5.1)
RBC # BLD AUTO: 4.76 M/UL (ref 4.2–5.9)
SODIUM SERPL-SCNC: 135 MMOL/L (ref 136–145)
WBC # BLD AUTO: 6.6 K/UL (ref 4–11)

## 2024-08-16 PROCEDURE — 36415 COLL VENOUS BLD VENIPUNCTURE: CPT

## 2024-08-16 PROCEDURE — 6370000000 HC RX 637 (ALT 250 FOR IP): Performed by: NURSE PRACTITIONER

## 2024-08-16 PROCEDURE — 6370000000 HC RX 637 (ALT 250 FOR IP): Performed by: INTERNAL MEDICINE

## 2024-08-16 PROCEDURE — 99232 SBSQ HOSP IP/OBS MODERATE 35: CPT | Performed by: NURSE PRACTITIONER

## 2024-08-16 PROCEDURE — 80048 BASIC METABOLIC PNL TOTAL CA: CPT

## 2024-08-16 PROCEDURE — 85027 COMPLETE CBC AUTOMATED: CPT

## 2024-08-16 PROCEDURE — 6360000002 HC RX W HCPCS: Performed by: INTERNAL MEDICINE

## 2024-08-16 PROCEDURE — 83735 ASSAY OF MAGNESIUM: CPT

## 2024-08-16 PROCEDURE — 2580000003 HC RX 258: Performed by: INTERNAL MEDICINE

## 2024-08-16 PROCEDURE — 85520 HEPARIN ASSAY: CPT

## 2024-08-16 PROCEDURE — 1200000000 HC SEMI PRIVATE

## 2024-08-16 RX ORDER — CEFDINIR 300 MG/1
300 CAPSULE ORAL EVERY 12 HOURS SCHEDULED
Status: DISCONTINUED | OUTPATIENT
Start: 2024-08-16 | End: 2024-08-16 | Stop reason: DRUGHIGH

## 2024-08-16 RX ORDER — CEFDINIR 300 MG/1
300 CAPSULE ORAL DAILY
Status: DISCONTINUED | OUTPATIENT
Start: 2024-08-16 | End: 2024-08-17 | Stop reason: HOSPADM

## 2024-08-16 RX ORDER — POTASSIUM CHLORIDE 20 MEQ/1
40 TABLET, EXTENDED RELEASE ORAL ONCE
Status: COMPLETED | OUTPATIENT
Start: 2024-08-16 | End: 2024-08-16

## 2024-08-16 RX ORDER — POTASSIUM CHLORIDE 20 MEQ/1
20 TABLET, EXTENDED RELEASE ORAL ONCE
Status: DISCONTINUED | OUTPATIENT
Start: 2024-08-16 | End: 2024-08-16

## 2024-08-16 RX ORDER — AMIODARONE HYDROCHLORIDE 200 MG/1
200 TABLET ORAL 2 TIMES DAILY
Status: DISCONTINUED | OUTPATIENT
Start: 2024-08-16 | End: 2024-08-17 | Stop reason: HOSPADM

## 2024-08-16 RX ADMIN — SODIUM CHLORIDE, PRESERVATIVE FREE 10 ML: 5 INJECTION INTRAVENOUS at 11:40

## 2024-08-16 RX ADMIN — ATORVASTATIN CALCIUM 20 MG: 10 TABLET, FILM COATED ORAL at 20:01

## 2024-08-16 RX ADMIN — Medication 1 TABLET: at 09:02

## 2024-08-16 RX ADMIN — METOPROLOL SUCCINATE 25 MG: 25 TABLET, EXTENDED RELEASE ORAL at 09:02

## 2024-08-16 RX ADMIN — POLYETHYLENE GLYCOL 3350 17 G: 17 POWDER, FOR SOLUTION ORAL at 14:16

## 2024-08-16 RX ADMIN — POTASSIUM CHLORIDE 40 MEQ: 1500 TABLET, EXTENDED RELEASE ORAL at 11:40

## 2024-08-16 RX ADMIN — CEFEPIME 1000 MG: 1 INJECTION, POWDER, FOR SOLUTION INTRAMUSCULAR; INTRAVENOUS at 03:07

## 2024-08-16 RX ADMIN — PANTOPRAZOLE SODIUM 40 MG: 40 TABLET, DELAYED RELEASE ORAL at 06:37

## 2024-08-16 RX ADMIN — CEFDINIR 300 MG: 300 CAPSULE ORAL at 14:16

## 2024-08-16 RX ADMIN — APIXABAN 2.5 MG: 2.5 TABLET, FILM COATED ORAL at 14:16

## 2024-08-16 RX ADMIN — APIXABAN 2.5 MG: 2.5 TABLET, FILM COATED ORAL at 20:01

## 2024-08-16 RX ADMIN — AMIODARONE HYDROCHLORIDE 200 MG: 200 TABLET ORAL at 13:11

## 2024-08-16 RX ADMIN — AMIODARONE HYDROCHLORIDE 200 MG: 200 TABLET ORAL at 20:01

## 2024-08-16 ASSESSMENT — PAIN SCALES - GENERAL
PAINLEVEL_OUTOF10: 0
PAINLEVEL_OUTOF10: 0

## 2024-08-16 NOTE — PROGRESS NOTES
[facility]     Electrophysiology                                     Progress Note    Admission date:  2024    Reason for follow up visit: AF and PVC's     HPI/CC: Mani Chirinos was admitted on 2024 with acute on chronic CHF. EP consulted for AF and PVC's. Has also been treated for pneumonia. Stress test  results most consistent with artifact but small degree of ischemia not ruled out. Rhythm has been sinus with PAC's and PVC's and brief NSVT.     Subjective: Denies chest pain, palpitations, shortness of breath, and dizziness. He is unaware of extra beats.       Vitals:  Blood pressure 121/63, pulse 70, temperature 97.7 °F (36.5 °C), temperature source Oral, resp. rate 16, height 1.778 m (5' 10\"), weight 72.1 kg (158 lb 14.4 oz), SpO2 95%.  Temp  Av.8 °F (36.6 °C)  Min: 97.5 °F (36.4 °C)  Max: 98 °F (36.7 °C)  Pulse  Av.4  Min: 60  Max: 96  BP  Min: 92/57  Max: 136/70  SpO2  Av.3 %  Min: 94 %  Max: 97 %    24 hour I/O    Intake/Output Summary (Last 24 hours) at 2024 1259  Last data filed at 2024 0623  Gross per 24 hour   Intake 1784.46 ml   Output 1000 ml   Net 784.46 ml     Current Facility-Administered Medications   Medication Dose Route Frequency Provider Last Rate Last Admin    amiodarone (CORDARONE) tablet 200 mg  200 mg Oral BID Alyson Suárez APRN - CNP        cefdinir (OMNICEF) capsule 300 mg  300 mg Oral Daily Cristina Serna APRN - CNP        metoprolol succinate (TOPROL XL) extended release tablet 25 mg  25 mg Oral Daily Alyson Suárez APRN - CNP   25 mg at 24 0902    calcium carb-cholecalciferol 250-3.125 MG-MCG per tab 1 tablet  1 tablet Oral Daily Checo Auguste MD   1 tablet at 24 0902    pantoprazole (PROTONIX) tablet 40 mg  40 mg Oral QAM AC Checo Auguste MD   40 mg at 24 0637    atorvastatin (LIPITOR) tablet 20 mg  20 mg Oral Nightly Checo Auguste MD   20 mg at 08/15/24 2013    sodium chloride  flush 0.9 % injection 5-40 mL  5-40 mL IntraVENous 2 times per day Checo Auguste MD   10 mL at 08/16/24 1140    sodium chloride flush 0.9 % injection 5-40 mL  5-40 mL IntraVENous PRN Checo Auguste MD        0.9 % sodium chloride infusion   IntraVENous PRN Checo Auguste MD        ondansetron (ZOFRAN-ODT) disintegrating tablet 4 mg  4 mg Oral Q8H PRN Checo Auguste MD        polyethylene glycol (GLYCOLAX) packet 17 g  17 g Oral Daily PRN Checo Auguste MD        acetaminophen (TYLENOL) tablet 650 mg  650 mg Oral Q6H PRN Checo Auguste MD        Or    acetaminophen (TYLENOL) suppository 650 mg  650 mg Rectal Q6H PRN Checo Auguste MD        heparin (porcine) injection 4,000 Units  4,000 Units IntraVENous PRN Checo Auguste MD        heparin (porcine) injection 2,000 Units  2,000 Units IntraVENous PRN Checo Auguste MD        heparin 25,000 units in dextrose 5% 250 mL (premix) infusion  1,230 Units/hr IntraVENous Continuous Barak Connor MD 12.3 mL/hr at 08/16/24 0623 1,230 Units/hr at 08/16/24 0623       Objective:     Telemetry monitor: SR with PVC's and PAC's     Physical Exam:  Constitutional and general appearance: alert, cooperative, no distress, and appears stated age  HEENT: PERRL, no cervical lymphadenopathy. No masses palpable. Normal oral mucosa  Respiratory:  Normal excursion and expansion without use of accessory muscles  Resp auscultation: Normal breath sounds without wheezing, rhonchi, and rales  Cardiovascular:  The apical impulse is not displaced  Heart tones are crisp and normal. irregular S1 and S2.  Jugular venous pulsation Normal  The carotid upstroke is normal in amplitude and contour without delay or bruit  Peripheral pulses are symmetrical and full   Abdomen:  No masses or tenderness  Bowel sounds present  Extremities:   No cyanosis or clubbing   No lower extremity edema   Skin: warm and dry  Neurological:  Alert and

## 2024-08-16 NOTE — TRANSITION OF CARE
Pt being transferred to A1 room 113. Report given over phone to receiving RN. Pt taken via wheelchair down to A1 with all belongings. PIV disconnected. Family aware of patient's move off per conversation in room. Pt has already informed family of which room he is being moved to.

## 2024-08-16 NOTE — PROGRESS NOTES
Hospitalist Progress Note      Name:  Mani Chirinos /Age/Sex: 1935  (89 y.o. male)   MRN & CSN:  [id number] & [id number] Admission Date/Time: 2024 12:11 PM   Location:  0423/0423-01 PCP: Zander Ko III, MD       I saw and examined the patient on 2024 at 12:44 PM.    Hospital Day: 5  Barriers to discharge: pending testing   Assessment and Plan:     89 y.o. male who presented to [facility] with complaint of worsening shortness of breath particularly with exertion.  Patient was seen in the cardiologist office by Jennifer Anguiano.  Patient was sent to the emergency department for CHF exacerbation. Patient status post recent nephrectomy of the right side due to urothelial carcinoma of the bladder.  Patient not on anticoagulation due to hematuria PTA.  Last echo from  showed EF of 30 to 35%.  Patient recently had been on Levaquin until .  Patient has also been on Keflex. ER work up suggestive of Pneumonia      Acute exacerbation of CHF EF 30 to 35%.  Cardiology following, on Lasix gtt now DC.    Diuretics per cardiology.    Appears euvolemic   Mgt per Cards   S/P stress - cardiology notes reviewed - demonstrated ischemia but wanting to hold off on angio due to kidneys appears medical mgt for now       Possible pneumonia. Continue on cefepime..  Clinically improving, will transition to oral antibiotics omnicef      Hyperlipidemia.   atorvastatin.     TIERA on CKD.  Creatinine elevated at 2.0.  Will consult nephrology.  Previous creatinine 1.4.  Noted nephrology input, stable     History of A-fib.  Currently on heparin gtt. probable plan to start DOAC upon discharge.  Heart rate well-controlled. Appreciate EP input -      S/p recent nephrectomy of the right side due to urothelial carcinoma of the bladder.  Now plan to start therapeutic anticoagulation monitor for hematuria  Blind    DVT prophylaxis heparin GTT  Full code    PT/OT with recs for Home Health        Subjective:    Patient seen and examined at bedside.  Laying comfortably in bed not in acute distress.  No acute events overnight.  Denies any chest pain or palpitations      Objective:     Intake/Output Summary (Last 24 hours) at 8/16/2024 1244  Last data filed at 8/16/2024 0623  Gross per 24 hour   Intake 1784.46 ml   Output 1000 ml   Net 784.46 ml      Vitals:   Vitals:    08/16/24 1143   BP: 121/63   Pulse: 70   Resp: 16   Temp: 97.7 °F (36.5 °C)   SpO2: 95%       Ten point ROS reviewed negative, unless as noted above    Physical Exam:     GENERAL APPEARANCE: not in any acute distress,  appears comfortable.   NECK: Supple, no JVD.  CARDIOVASCULAR: Regular rate and rhythm, no murmurs, rubs or gallops  LUNGS: clear to auscultation bilaterally   ABDOMEN: normoactive bowel sounds, soft non-tender and non distended  EXTREMITIES: No edema  MUSCULOSKELETAL S: normal movement and normal bulk in all ext  NEUROLOGIC: Alert and oriented x 3- grossly non focal exam, moving all extremities   SKIN: No Rashes       Electronically signed by AVINASH Mahoney CNP on 8/16/2024 at 12:44 PM    Minimum 35 Minutes spent in preparation of following this patient including face to face encounter, discussions with the patient and/or family, medication reconciliation, and transition of care preparation.            Medications:   Medications:    amiodarone  200 mg Oral BID    metoprolol succinate  25 mg Oral Daily    calcium carb-cholecalciferol  1 tablet Oral Daily    pantoprazole  40 mg Oral QAM AC    atorvastatin  20 mg Oral Nightly    sodium chloride flush  5-40 mL IntraVENous 2 times per day    cefepime  1,000 mg IntraVENous Q12H      Infusions:    sodium chloride      heparin (PORCINE) Infusion 1,230 Units/hr (08/16/24 0623)     PRN Meds: sodium chloride flush, 5-40 mL, PRN  sodium chloride, , PRN  ondansetron, 4 mg, Q8H PRN  polyethylene glycol, 17 g, Daily PRN  acetaminophen, 650 mg, Q6H PRN   Or  acetaminophen, 650 mg, Q6H PRN  heparin

## 2024-08-16 NOTE — PROGRESS NOTES
Cross-cover MD made aware patient had 6 beats of VTACH. Patient was asymptomatic. VSS. No new orders at this time.Will continue to monitor patient. Electronically signed by Tasia Sosa RN on 8/16/2024 at 5:21 PM

## 2024-08-16 NOTE — PROGRESS NOTES
[facility]  Nephrology follow up Note           Reason for Consult: TIERA on chronic kidney disease stage III  Requesting Physician:  Dr. Connor    Sub/interval history  Resting  Lasix drip was stopped on 8/15 a.m.    Last 24 h uop 1.9 l    ROS: No chest pain/shortness of breath/fever/nausea/vomiting  PSFH: + visitor    Scheduled Meds:   metoprolol succinate  25 mg Oral Daily    calcium carb-cholecalciferol  1 tablet Oral Daily    pantoprazole  40 mg Oral QAM AC    atorvastatin  20 mg Oral Nightly    sodium chloride flush  5-40 mL IntraVENous 2 times per day    cefepime  1,000 mg IntraVENous Q12H     Continuous Infusions:   sodium chloride      heparin (PORCINE) Infusion 1,230 Units/hr (08/16/24 0623)     PRN Meds:.sodium chloride flush, sodium chloride, ondansetron, polyethylene glycol, acetaminophen **OR** acetaminophen, heparin (porcine), heparin (porcine)      History of Present Illness on 8/13/24:    89 y.o. 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  He also has had anemia and needed blood transfusions in July along with iron transfusions as well.  Since admission he was started on Lasix drip at 5 mg/h as outpatient management for heart failure did not improve his symptoms despite diuresing.    Physical exam:   Constitutional:  VITALS:  /63   Pulse 70   Temp 97.7 °F (36.5 °C) (Oral)   Resp 16   Ht 1.778 m (5' 10\")   Wt 72.1 kg (158 lb 14.4 oz)   SpO2 95%   BMI 22.80 kg/m²   Gen: alert, awake  Neck: No JVD  Skin: Unremarkable  Cardiovascular:  S1,  S2 without m/r/g   Respiratory: CTA B without w/r/r; respiratory effort normal  Abdomen:  soft, nt, nd,   Extremities: no lower extremity edema  Neuro/Psy: AAoriented times 3 ; moves all 4 ext    Data/  Recent Labs     08/14/24  0502 08/16/24  0523   WBC 7.5 6.6   HGB 13.1* 12.5*   HCT 40.2* 39.0*   MCV 82.5 82.0    182     Recent Labs     08/14/24  0502 08/15/24  0508 08/16/24  0523    135* 135*   K 3.5 3.5 3.3*   CL 99 95* 96*   CO2 25 25 28   GLUCOSE 119* 111* 109*   MG 2.10 2.00 2.10   BUN 27* 29* 32*   CREATININE 2.0* 2.1* 1.9*   LABGLOM 31* 30* 33*     CT chest without contrast  IMPRESSION:  1. Asymmetric pattern of dense airspace opacification in the right lower lobe.  Pneumonia or atelectasis may be considered.  2. Emphysema with evidence of old granulomatous disease.    Echocardiogram from 7/8/2024    Image quality is technically difficult. Technically difficult study.    Left Ventricle: Moderately reduced left ventricular systolic function with a visually estimated EF of 30 - 35%. Left ventricle is dilated. Mildly increased wall thickness. Findings consistent with mild concentric hypertrophy. Global hypokinesis present with regional variation.    Aortic Valve: Mild regurgitation. Mild stenosis of the aortic valve. AV mean gradient is 10 mmHg. AV peak gradient is 16 mmHg. AV peak velocity is 2.0 m/s. AV area by continuity VTI is 2.6 cm2.    Mitral Valve: Mild regurgitation.    Tricuspid Valve: Moderately elevated RVSP, consistent with moderate pulmonary hypertension.    Right Atrium: Right atrium is mildly dilated.    Echocardiogram from 8/15/2024    Left Ventricle: Moderately reduced left ventricular systolic function   with a visually estimated EF of 30 - 35%. EF by 2D Simpsons Biplane is   35%. Left ventricle size is normal. Normal wall thickness. Moderate global   hypokinesis with regional variations. E/e' ratio >11, suggesting increased   LAP.    Aortic Valve: Mild regurgitation.     Image

## 2024-08-16 NOTE — CARE COORDINATION
Lasix gtt stopped. Stress imaging completed. From home with spouse. Home when cleared by EP. Legally blind and usually independent with adl's, has cane. If needs hhc has used [facility] in past.

## 2024-08-16 NOTE — PLAN OF CARE
CHF Care Plan      Patient's EF (Ejection Fraction) is less than 40%    Heart Failure Medications:  Diuretics:: Furosemide    (One of the following REQUIRED for EF </= 40%/SYSTOLIC FAILURE but MAY be used in EF% >40%/DIASTOLIC FAILURE)        ACE:: None        ARB:: None         ARNI:: None    (Beta Blockers)  NON- Evidenced Based Beta Blocker (for EF% >40%/DIASTOLIC FAILURE): None    Evidenced Based Beta Blocker::(REQUIRED for EF% <40%/SYSTOLIC FAILURE) None  ...................................................................................................................................................    Failed to redirect to the Timeline version of the [employer] SmartLink.      Patient's weights and intake/output reviewed    Daily Weight log at bedside, patient/family participation in use of log: \"yes    Patient's current weight today shows a difference of  1.1 lbs  less than last documented weight.      Intake/Output Summary (Last 24 hours) at 8/16/2024 0259  Last data filed at 8/15/2024 2137  Gross per 24 hour   Intake 360 ml   Output 845 ml   Net -485 ml       Education Booklet Provided: yes    Comorbidities Reviewed Yes    Patient has a past medical history of Acid reflux, Asthenia pigmentosa (HCC), BPH (benign prostatic hyperplasia), Cancer (HCC), CHF (congestive heart failure) (HCC), History of blood transfusion, Hyperlipidemia, Hypertension, Irregular heart beat, Kidney stones, Legal blindness, PONV (postoperative nausea and vomiting), and Retinitis.     >>For CHF and Comorbidity documentation on Education Time and Topics, please see Education Tab      Pt resting in bed at this time on room air. Pt denies shortness of breath. Pt without lower extremity edema.     Patient and/or Family's stated Goal of Care this Admission: reduce shortness of breath, increase activity tolerance, better understand heart failure and disease management, be more comfortable, and reduce lower extremity edema prior to

## 2024-08-16 NOTE — PLAN OF CARE
Problem: Discharge Planning  Goal: Discharge to home or other facility with appropriate resources  8/16/2024 0247 by Joselyn Dodge RN  Outcome: Progressing  8/15/2024 2114 by Jenna Guerra RN  Outcome: Progressing  Flowsheets  Taken 8/15/2024 2015 by Jenna Guerra RN  Discharge to home or other facility with appropriate resources:   Arrange for needed discharge resources and transportation as appropriate   Identify barriers to discharge with patient and caregiver   Identify discharge learning needs (meds, wound care, etc)  Taken 8/15/2024 1456 by Alexandria Lopez RN  Discharge to home or other facility with appropriate resources: Identify barriers to discharge with patient and caregiver     Problem: Pain  Goal: Verbalizes/displays adequate comfort level or baseline comfort level  8/16/2024 0247 by Joselyn Dodge RN  Outcome: Progressing  8/15/2024 2114 by Jenna Guerra RN  Outcome: Progressing  Flowsheets  Taken 8/15/2024 2020  Verbalizes/displays adequate comfort level or baseline comfort level:   Encourage patient to monitor pain and request assistance   Assess pain using appropriate pain scale   Administer analgesics based on type and severity of pain and evaluate response   Implement non-pharmacological measures as appropriate and evaluate response  Taken 8/15/2024 2011  Verbalizes/displays adequate comfort level or baseline comfort level:   Encourage patient to monitor pain and request assistance   Assess pain using appropriate pain scale   Administer analgesics based on type and severity of pain and evaluate response   Implement non-pharmacological measures as appropriate and evaluate response     Problem: ABCDS Injury Assessment  Goal: Absence of physical injury  8/16/2024 0247 by Joselyn Dodge RN  Outcome: Progressing  8/15/2024 2114 by Jenna Guerra RN  Outcome: Progressing     Problem: Safety - Adult  Goal: Free from fall injury  8/16/2024 0247 by Joselyn Dodge

## 2024-08-16 NOTE — PLAN OF CARE
Problem: Discharge Planning  Goal: Discharge to home or other facility with appropriate resources  Outcome: Progressing  Flowsheets  Taken 8/15/2024 2015 by Jenna Guerra RN  Discharge to home or other facility with appropriate resources:   Arrange for needed discharge resources and transportation as appropriate   Identify barriers to discharge with patient and caregiver   Identify discharge learning needs (meds, wound care, etc)  Taken 8/15/2024 1456 by Alexandria Lopez RN  Discharge to home or other facility with appropriate resources: Identify barriers to discharge with patient and caregiver     Problem: Pain  Goal: Verbalizes/displays adequate comfort level or baseline comfort level  Outcome: Progressing  Flowsheets  Taken 8/15/2024 2020  Verbalizes/displays adequate comfort level or baseline comfort level:   Encourage patient to monitor pain and request assistance   Assess pain using appropriate pain scale   Administer analgesics based on type and severity of pain and evaluate response   Implement non-pharmacological measures as appropriate and evaluate response  Taken 8/15/2024 2011  Verbalizes/displays adequate comfort level or baseline comfort level:   Encourage patient to monitor pain and request assistance   Assess pain using appropriate pain scale   Administer analgesics based on type and severity of pain and evaluate response   Implement non-pharmacological measures as appropriate and evaluate response     Problem: ABCDS Injury Assessment  Goal: Absence of physical injury  Outcome: Progressing     Problem: Safety - Adult  Goal: Free from fall injury  Outcome: Progressing     Problem: Chronic Conditions and Co-morbidities  Goal: Patient's chronic conditions and co-morbidity symptoms are monitored and maintained or improved  Outcome: Progressing  Flowsheets  Taken 8/15/2024 2015 by Jenna Guerra, RN  Care Plan - Patient's Chronic Conditions and Co-Morbidity Symptoms are Monitored and Maintained

## 2024-08-16 NOTE — PROGRESS NOTES
[facility]   Daily Progress Note      Admit Date:  8/12/2024    Reason for follow up visit: SOB    CC: \" I think I am feeling some better. My breathing seems to be doing better.\"    88 y/o male with PMH notable for bladder cancar, Kidney cancer and S/P laparoscopic nephroureterectomy 6/2024, HTN, blindness PSVT, PAF, CHF who was admitted with acute on chronic CHF, atrial fib, PVC's. Being treated for CHF and PNA. Had been diuresed with a lasix infusion and stopped on 8/15/24.     Interval History:  Pt. seen and examined; records reviewed  Net diuresis -4.4 L since admit; wt today 158#  BP stable.   K+ 3.3 today   Cr 1.9  Cough continues to improve  SOB improving gradually  Denies chest pain, palpitations or dizziness  Echo and stress results discussed with Mani and his wife  Amiodarone started today    Subjective:  Pt with no acute overnight cardiac events.   + blindness  A 12 point review of systems was completed. Pertinent positives were identified in the HPI/interval history. All other review of symptoms negative unless otherwise noted below.    Objective:   /64   Pulse 62   Temp 97.7 °F (36.5 °C) (Oral)   Resp 15   Ht 1.778 m (5' 10\")   Wt 72.1 kg (158 lb 14.4 oz)   SpO2 94%   BMI 22.80 kg/m²     Intake/Output Summary (Last 24 hours) at 8/16/2024 0902  Last data filed at 8/16/2024 0623  Gross per 24 hour   Intake 1784.46 ml   Output 1000 ml   Net 784.46 ml     Wt Readings from Last 3 Encounters:   08/16/24 72.1 kg (158 lb 14.4 oz)   08/12/24 73.4 kg (161 lb 12.8 oz)   07/08/24 87.1 kg (192 lb)       Physical Exam:  General: In no acute distress. Awake, alert, and oriented X4. Resting in bed  Skin:  Warm and dry.    Neck:  Supple. No JVD  Chest: Lungs clear to auscultation. No wheezes/rhonchi/rales  Cardiovascular:  irreg, irreg; normal S1 and S2; soft systolic murmur   Abdomen:  soft, nontender, nondistended, +bowel sounds.   Extremities:  No LE edema. 2+ bilateral radial/DP pulses. Cap

## 2024-08-17 VITALS
BODY MASS INDEX: 22.72 KG/M2 | DIASTOLIC BLOOD PRESSURE: 52 MMHG | HEIGHT: 70 IN | TEMPERATURE: 98 F | HEART RATE: 53 BPM | SYSTOLIC BLOOD PRESSURE: 109 MMHG | OXYGEN SATURATION: 96 % | WEIGHT: 158.73 LBS | RESPIRATION RATE: 16 BRPM

## 2024-08-17 LAB
ANION GAP SERPL CALCULATED.3IONS-SCNC: 10 MMOL/L (ref 3–16)
BUN SERPL-MCNC: 29 MG/DL (ref 7–20)
CALCIUM SERPL-MCNC: 9.2 MG/DL (ref 8.3–10.6)
CHLORIDE SERPL-SCNC: 99 MMOL/L (ref 99–110)
CO2 SERPL-SCNC: 24 MMOL/L (ref 21–32)
CREAT SERPL-MCNC: 1.7 MG/DL (ref 0.8–1.3)
GFR SERPLBLD CREATININE-BSD FMLA CKD-EPI: 38 ML/MIN/{1.73_M2}
GLUCOSE SERPL-MCNC: 98 MG/DL (ref 70–99)
MAGNESIUM SERPL-MCNC: 2.1 MG/DL (ref 1.8–2.4)
POTASSIUM SERPL-SCNC: 4 MMOL/L (ref 3.5–5.1)
SODIUM SERPL-SCNC: 133 MMOL/L (ref 136–145)

## 2024-08-17 PROCEDURE — 6370000000 HC RX 637 (ALT 250 FOR IP): Performed by: NURSE PRACTITIONER

## 2024-08-17 PROCEDURE — 83735 ASSAY OF MAGNESIUM: CPT

## 2024-08-17 PROCEDURE — 80048 BASIC METABOLIC PNL TOTAL CA: CPT

## 2024-08-17 PROCEDURE — 6370000000 HC RX 637 (ALT 250 FOR IP): Performed by: INTERNAL MEDICINE

## 2024-08-17 PROCEDURE — 99232 SBSQ HOSP IP/OBS MODERATE 35: CPT | Performed by: NURSE PRACTITIONER

## 2024-08-17 PROCEDURE — 2580000003 HC RX 258: Performed by: INTERNAL MEDICINE

## 2024-08-17 PROCEDURE — 36415 COLL VENOUS BLD VENIPUNCTURE: CPT

## 2024-08-17 RX ORDER — FUROSEMIDE 20 MG/1
20 TABLET ORAL DAILY
Qty: 60 TABLET | Refills: 3 | Status: SHIPPED | OUTPATIENT
Start: 2024-08-17

## 2024-08-17 RX ORDER — METOPROLOL SUCCINATE 25 MG/1
25 TABLET, EXTENDED RELEASE ORAL DAILY
Qty: 30 TABLET | Refills: 3 | Status: SHIPPED | OUTPATIENT
Start: 2024-08-18

## 2024-08-17 RX ORDER — AMIODARONE HYDROCHLORIDE 200 MG/1
200 TABLET ORAL 2 TIMES DAILY
Qty: 60 TABLET | Refills: 1 | Status: SHIPPED | OUTPATIENT
Start: 2024-08-17 | End: 2024-10-16

## 2024-08-17 RX ORDER — CEFDINIR 300 MG/1
300 CAPSULE ORAL DAILY
Qty: 3 CAPSULE | Refills: 0 | Status: SHIPPED | OUTPATIENT
Start: 2024-08-18 | End: 2024-08-21

## 2024-08-17 RX ADMIN — AMIODARONE HYDROCHLORIDE 200 MG: 200 TABLET ORAL at 08:09

## 2024-08-17 RX ADMIN — APIXABAN 2.5 MG: 2.5 TABLET, FILM COATED ORAL at 08:09

## 2024-08-17 RX ADMIN — CEFDINIR 300 MG: 300 CAPSULE ORAL at 08:09

## 2024-08-17 RX ADMIN — PANTOPRAZOLE SODIUM 40 MG: 40 TABLET, DELAYED RELEASE ORAL at 04:39

## 2024-08-17 RX ADMIN — SODIUM CHLORIDE, PRESERVATIVE FREE 10 ML: 5 INJECTION INTRAVENOUS at 08:10

## 2024-08-17 RX ADMIN — Medication 1 TABLET: at 08:09

## 2024-08-17 RX ADMIN — METOPROLOL SUCCINATE 25 MG: 25 TABLET, EXTENDED RELEASE ORAL at 08:08

## 2024-08-17 NOTE — PROGRESS NOTES
[facility]   Heart Failure Daily Progress Note    Admit Date:  8/12/2024  HPI:    Chief Complaint   Patient presents with    Bradycardia     Recently admitted for pneumonia, CHF, a-fib. Prescribed steroids, lasix and abx not improving.   Had follow up appointment with cardiology and was sent to ED for dyspnea, bradycardia and overall not improving symptoms since last admission.             Mani Chirinos is being followed for shortness of breath.   Sent in from [facility] with me on 8/12/2024 for worsening shortness of breath despite 6lbs diuresis with PO lasix in the last 1 week and ongoing cough.    Started on lasix infusion on admission for CHF  Being treated for PNA.     Interval history: lasix infusion stopped 8/15/2024  Started on amiodarone PO 8/16/2024    Subjective:  Mr. Chirinos feels good this am. No chest pain.   Coughing a little but still much better since admission.     Objective:   /65   Pulse 65   Temp 98 °F (36.7 °C) (Oral)   Resp 18   Ht 1.778 m (5' 10\")   Wt 72 kg (158 lb 11.7 oz)   SpO2 96%   BMI 22.78 kg/m²     Intake/Output Summary (Last 24 hours) at 8/17/2024 0754  Last data filed at 8/17/2024 0300  Gross per 24 hour   Intake 360 ml   Output 850 ml   Net -490 ml       NYHA: III    Physical Exam:  General:  Awake, alert, NAD  Skin:  Warm and dry  Neck:  JVD~ 8-10  Chest:  Clear to auscultation, no wheezes/rhonchi/rales  Cardiovascular:  irreg reg  S1S2, no m/r/g   Abdomen:  Soft, nontender, +bowel sounds  Extremities:  no bilateral lower extremity edema    Medications:    amiodarone  200 mg Oral BID    cefdinir  300 mg Oral Daily    apixaban  2.5 mg Oral BID    metoprolol succinate  25 mg Oral Daily    calcium carb-cholecalciferol  1 tablet Oral Daily    pantoprazole  40 mg Oral QAM AC    atorvastatin  20 mg Oral Nightly    sodium chloride flush  5-40 mL IntraVENous 2 times per day      sodium chloride         Lab Data:  CBC:   Recent Labs     08/16/24  0523   WBC

## 2024-08-17 NOTE — PROGRESS NOTES
[facility]  Nephrology follow up Note           Reason for Consult: TIERA on chronic kidney disease stage III  Requesting Physician:  Dr. Connor    Sub/interval history  Resting  Getting discharged   Lasix drip was stopped on 8/15 a.m.    Weight on 8/17/2024 was 158 pounds    ROS: No chest pain/shortness of breath/fever/nausea/vomiting  PSFH: + visitor    Scheduled Meds:   amiodarone  200 mg Oral BID    cefdinir  300 mg Oral Daily    apixaban  2.5 mg Oral BID    metoprolol succinate  25 mg Oral Daily    calcium carb-cholecalciferol  1 tablet Oral Daily    pantoprazole  40 mg Oral QAM AC    atorvastatin  20 mg Oral Nightly    sodium chloride flush  5-40 mL IntraVENous 2 times per day     Continuous Infusions:   sodium chloride       PRN Meds:.sodium chloride flush, sodium chloride, ondansetron, polyethylene glycol, acetaminophen **OR** acetaminophen      History of Present Illness on 8/13/24:    89 y.o. 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  He also has had anemia and needed blood transfusions in July along with iron transfusions as well.  Since admission he was started on Lasix drip at 5 mg/h as outpatient management for heart failure did not improve his symptoms despite diuresing.    Physical exam:   Constitutional:  VITALS:  BP (!) 109/52   Pulse 53   Temp 98 °F (36.7 °C) (Oral)   Resp 16   Ht 1.778 m (5' 10\")   Wt 72 kg (158 lb 11.7 oz)   SpO2 96%   BMI 22.78 kg/m²   Gen: alert, awake  Neck: No JVD  Skin: Unremarkable  Cardiovascular:  S1, S2 without

## 2024-08-17 NOTE — FLOWSHEET NOTE
08/16/24 2112   Assessment   Charting Type Shift assessment   Psychosocial   Psychosocial (WDL) WDL   Neurological   Neuro (WDL) WDL   Level of Consciousness 0   Orientation Level Oriented X4   Cognition Follows commands   Speech Clear   RUE Motor Response Responds to command;Normal flexion;Normal extension   RUE Sensation  Full sensation;No numbness;No pain;No tingling   LUE Motor Response Responds to command;Normal flexion;Normal extension   LUE Sensation  Full sensation;No numbness;No pain;No tingling   RLE Motor Response Responds to command;Normal flexion;Normal extension   RLE Sensation  Full sensation;No numbness;No tingling;No pain   LLE Motor Response Responds to command;Normal flexion;Normal extension   LLE Sensation  Full sensation;No numbness;No pain;No tingling   Los Coma Scale   Eye Opening 4   Best Verbal Response 5   Best Motor Response 6   Los Coma Scale Score 15   HEENT (Head, Ears, Eyes, Nose, & Throat)   HEENT (WDL) X   Right Eye Blind   Left Eye Blind   Respiratory   Respiratory (WDL) WDL   Respiratory Interventions H.O.B. elevated   Respiratory Pattern Regular   Respiratory Depth Normal   Respiratory Quality/Effort Unlabored   Chest Assessment Chest expansion symmetrical;Trachea midline   L Breath Sounds Diminished   R Breath Sounds Diminished   Level of Activity/Mobility 1   Subcutaneous Air/Crepitus None   Cardiac   Cardiac (WDL) X   Cardiac Regularity Irregular   Heart Sounds S1, S2   Cardiac Rhythm Pair PVCs   Cardiac Monitor   Cardiac/Telemetry Monitor On Portable telemetry pack applied   Gastrointestinal   Abdominal (WDL) WDL   Last BM (including prior to admit) 08/16/24   Genitourinary   Genitourinary (WDL) X  (chronic friedman)   Urine Assessment   Urinary Status Friedman   Peripheral Vascular   Peripheral Vascular (WDL) WDL   Edema None   Skin Integrity Site 2   Skin Integrity Location 2 Wound (see LDA)   Location 2 abd   Musculoskeletal   Musculoskeletal (WDL) WDL

## 2024-08-17 NOTE — PROGRESS NOTES
Pt assessment completed and charted. VSS. Pt a/ox4. Pt denies pain. Pt tolerating diet. Pt has f/c in place and patent. Bermudez care and CHG completed. Pt denies any other needs at this time.  Pt calls out appropriately.       Pt is a fall risk;  -Bed in lowest position and wheels locked.  -Call light within reach.   -Bedside table within reach.   -Non-skid footwear in place.  -bed check in place.

## 2024-08-17 NOTE — DISCHARGE INSTR - COC
Continuity of Care Form    Patient Name: Mani Chirinos   :  1935  MRN:  [id number]    Admit date:  2024  Discharge date:  2024      Code Status Order: Full Code   Advance Directives:   Advance Care Flowsheet Documentation             Admitting Physician:  Checo Auguste MD  PCP: Zander Ko III, MD    Discharging Nurse: HILARIO villareal  Discharging Hospital Unit/Room#: 0113/0113-02  Discharging Unit Phone Number: [phone]      Emergency Contact:   Extended Emergency Contact Information  Primary Emergency Contact: Coral ChirinosOA  Address: [address] [place] [address] [facility]  Home Phone: [phone]  Relation: Domestic Partner  Secondary Emergency Contact: Yesenia Ferrell  Address: [place], [place] [id number] [place] [address]  Home Phone: [phone]  Relation: Child    Past Surgical History:  Past Surgical History:   Procedure Laterality Date    BACK SURGERY      CHOLECYSTECTOMY      CYSTOSCOPY N/A 2024    CYSTOSCOPY, CLOT EVACUATION, TRANSURETHRAL RESECTION OF BLADDER TUMOR performed by Jag Bourgeois MD at [facility] OR    EYE SURGERY Bilateral     cataract    HERNIA REPAIR Right     INGUINAL    KIDNEY REMOVAL Right 2024    OTHER SURGICAL HISTORY N/A 10/14/2016    LUMBAR LAMINECTOMY AND INSTRUMENTED FUSION L4/5    TURP         Immunization History:   Immunization History   Administered Date(s) Administered    COVID-19, MODERNA BLUE border, Primary or Immunocompromised, (age 12y+), IM, 100 mcg/0.5mL 2021    COVID-19, MODERNA Bivalent, (age 12y+), IM, 50 mcg/0.5 mL 10/03/2022    COVID-19, PFIZER GRAY top, DO NOT Dilute, (age 12 y+), IM, 30 mcg/0.3 mL 10/18/2023    Influenza, FLUARIX, FLULAVAL, FLUZONE (age 6 mo+) and AFLURIA, (age 3 y+), Quadv PF, 0.5mL 10/15/2016    Pneumococcal, PCV-13, PREVNAR 13, (age 6w+), IM, 0.5mL 10/16/2016       Active Problems:  Patient Active Problem List    Diagnosis Code    Hx of decompressive lumbar laminectomy Z98.890    Lumbar stenosis with neurogenic claudication M48.062    Spondylolisthesis, lumbar region  L45 M43.16    Chronic bilateral low back pain with sciatica M54.40, G89.29    Lumbar spondylosis with myelopathy M47.16    Angioedema T78.3XXA    Allergic reaction T78.40XA    PAF (paroxysmal atrial fibrillation) ([facility]) I48.0    Gross hematuria R31.0    CHF (congestive heart failure), NYHA class I, acute on chronic, combined ([facility]) I50.43    NSVT (nonsustained ventricular tachycardia) ([facility]) I47.29    Acute HFrEF (heart failure with reduced ejection fraction) ([facility]) I50.21    Acute decompensated heart failure ([facility]) I50.9    TIERA (acute kidney injury) ([facility]) N17.9    Cardiomyopathy ([facility]) I42.9    PVC (premature ventricular contraction) I49.3       Isolation/Infection:   Isolation            No Isolation          Patient Infection Status       None to display            Nurse Assessment:  Last Vital Signs: BP (!) 119/59   Pulse 56   Temp 97.5 °F (36.4 °C) (Oral)   Resp 16   Ht 1.778 m (5' 10\")   Wt 72 kg (158 lb 11.7 oz)   SpO2 96%   BMI 22.78 kg/m²     Last documented pain score (0-10 scale): Pain Level: 0  Last Weight:   Wt Readings from Last 1 Encounters:   08/17/24 72 kg (158 lb 11.7 oz)     Mental Status:  oriented and alert    IV Access:  - None    Nursing Mobility/ADLs:  Walking   Assisted  Transfer  Assisted  Bathing  Assisted  Dressing  Assisted  Toileting  Independent  Feeding  Assisted  Med Admin  Independent  Med Delivery   whole    Wound Care Documentation and Therapy:  Incision 10/14/16 Back Lower (Active)   Number of days: 2863       Wound 07/09/24 Abdomen Lateral;Lower;Right (Active)   Number of days: 38       Wound 07/09/24 Abdomen Lateral;Left;Lower (Active)   Number of days: 38       Incision 07/06/24 Abdomen Lower;Medial (Active)   Number of days: 42        Elimination:  Continence:   Bowel: Yes  Bladder: friedman    Urinary Catheter: Last

## 2024-08-17 NOTE — PLAN OF CARE
Problem: Discharge Planning  Goal: Discharge to home or other facility with appropriate resources  8/17/2024 1013 by Aline Jensen RN  Outcome: Progressing  Flowsheets (Taken 8/17/2024 1013)  Discharge to home or other facility with appropriate resources:   Identify barriers to discharge with patient and caregiver   Arrange for needed discharge resources and transportation as appropriate     Problem: Pain  Goal: Verbalizes/displays adequate comfort level or baseline comfort level  8/17/2024 1013 by Aline Jensen RN  Outcome: Progressing  Flowsheets (Taken 8/17/2024 1013)  Verbalizes/displays adequate comfort level or baseline comfort level:   Encourage patient to monitor pain and request assistance   Assess pain using appropriate pain scale   Administer analgesics based on type and severity of pain and evaluate response     Problem: ABCDS Injury Assessment  Goal: Absence of physical injury  8/17/2024 1013 by Aline Jensen RN  Outcome: Progressing  Flowsheets (Taken 8/17/2024 1013)  Absence of Physical Injury: Implement safety measures based on patient assessment     Problem: Safety - Adult  Goal: Free from fall injury  8/17/2024 1013 by Aline Jensen RN  Outcome: Progressing  Flowsheets (Taken 8/17/2024 1013)  Free From Fall Injury:   Instruct family/caregiver on patient safety   Based on caregiver fall risk screen, instruct family/caregiver to ask for assistance with transferring infant if caregiver noted to have fall risk factors

## 2024-08-17 NOTE — PLAN OF CARE
CHF Care Plan      Patient's EF (Ejection Fraction) is less than 40%    Heart Failure Medications:  Diuretics:: Furosemide    (One of the following REQUIRED for EF </= 40%/SYSTOLIC FAILURE but MAY be used in EF% >40%/DIASTOLIC FAILURE)        ACE:: None        ARB:: None         ARNI:: None    (Beta Blockers)  NON- Evidenced Based Beta Blocker (for EF% >40%/DIASTOLIC FAILURE): None    Evidenced Based Beta Blocker::(REQUIRED for EF% <40%/SYSTOLIC FAILURE) None  ...................................................................................................................................................    Failed to redirect to the Timeline version of the [employer] Smart[employer].      Patient's weights and intake/output reviewed    Daily Weight log at bedside, patient/family participation in use of log: \"yes    Patient's current weight today shows a difference of 0 lbs and 2.7 ounces less than last documented weight.      Intake/Output Summary (Last 24 hours) at 8/16/2024 2104  Last data filed at 8/16/2024 1818  Gross per 24 hour   Intake 1664.46 ml   Output 1000 ml   Net 664.46 ml       Education Booklet Provided: yes    Comorbidities Reviewed Yes    Patient has a past medical history of Acid reflux, Asthenia pigmentosa (HCC), BPH (benign prostatic hyperplasia), Cancer (HCC), CHF (congestive heart failure) (HCC), History of blood transfusion, Hyperlipidemia, Hypertension, Irregular heart beat, Kidney stones, Legal blindness, PONV (postoperative nausea and vomiting), and Retinitis.     >>For CHF and Comorbidity documentation on Education Time and Topics, please see Education Tab      Pt resting in bed at this time on room air. Pt denies shortness of breath. Pt without lower extremity edema.     Patient and/or Family's stated Goal of Care this Admission: reduce shortness of breath, increase activity tolerance, better understand heart failure and disease management, be more comfortable, and reduce lower extremity edema  prior to discharge        :

## 2024-08-17 NOTE — DISCHARGE SUMMARY
Hospital Medicine Discharge Summary    Patient: Mani Chirinos   : 1935     Admit Date: 2024   Discharge Date: 2024  Disposition:  [x]Home   []HHC  []SNF  []ECF  []Acute Rehab  []LTAC  []Hospice  Code status:  [x]Full  []DNR/CCA  []Limited (DNR/CCA with Do Not Intubate)  []DNRCC  Condition at Discharge: Stable  Primary Care Provider: Zander Ko III, MD    Admitting Provider: Checo Auguste MD  Discharge Provider: Elvie Li, APRN - CNP     Discharge Diagnoses:      Active Hospital Problems    Diagnosis     Acute HFrEF (heart failure with reduced ejection fraction) ([facility]) [I50.21]     Acute decompensated heart failure ([facility]) [I50.9]     TIERA (acute kidney injury) ([facility]) [N17.9]     Cardiomyopathy ([facility]) [I42.9]     PVC (premature ventricular contraction) [I49.3]     NSVT (nonsustained ventricular tachycardia) ([facility]) [I47.29]     CHF (congestive heart failure), NYHA class I, acute on chronic, combined ([facility]) [I50.43]     PAF (paroxysmal atrial fibrillation) ([facility]) [I48.0]        Presenting Admission History:      This is an 89 y.o. male who presented to [facility] with complaint of worsening shortness of breath particularly with exertion. Patient was seen in the cardiologist office by Jennifer Anguiano. Patient was sent to the emergency department for CHF exacerbation. Patient status post recent nephrectomy of the right side due to urothelial carcinoma of the bladder. Patient not on anticoagulation due to hematuria PTA. Last echo from  showed EF of 30 to 35%. Patient recently had been on Levaquin until . Patient has also been on Keflex. ER work up suggestive of Pneumonia        Assessment/Plan:      CHF - acute systolic/diastolic failure w/ reduced EF 30-35% by Echo dated 8/15/24.  Likely due to hypertensive heart disease.   Continue diuresis as currently ordered w/ close monitoring of Renal function and electrolytes for ADR.    Continue current  superior thorax:  Adequate         Left lateral / inferior thorax:  Adequate     Findings:          Lung sliding:  Present         Lung point sign:  Absent         Interstitium: a-lines:  Present         Lung consolidation:  Indeterminate     Interpretation:          Pneumothorax:  Not present         No sonographic evidence of acute pulmonary disease     Confirmatory study:          What confirmatory study was done?:  CT  Electronically signed by Jose Winter on Monday, August 12, 2024 at 2:27 PM : Attending:     CT CHEST WO CONTRAST    Result Date: 8/12/2024  EXAMINATION: CT OF THE CHEST WITHOUT CONTRAST 8/12/2024 1:36 pm TECHNIQUE: CT of the chest was performed without the administration of intravenous contrast. Multiplanar reformatted images are provided for review. Automated exposure control, iterative reconstruction, and/or weight based adjustment of the mA/kV was utilized to reduce the radiation dose to as low as reasonably achievable. COMPARISON: None. HISTORY: ORDERING SYSTEM PROVIDED HISTORY: arrhythmia, sob, tachy TECHNOLOGIST PROVIDED HISTORY: Reason for exam:->arrhythmia, sob, tachy Decision Support Exception - unselect if not a suspected or confirmed emergency medical condition->Emergency Medical Condition (MA) Reason for Exam: sob, tacky, cough Additional signs and symptoms: had kidney removed 4-5 weeks ago FINDINGS: Mediastinum: Heart size normal.  Trace pericardial effusion with no complex features.  Partially calcified mediastinal nodes representing granulomatous change.  Thyroid and esophagus are normal.  Aorta and pulmonary arteries appear normal in caliber. Lungs/pleura: The airways are patent.  No pleural fluid.  Centrilobular emphysema with scattered calcified granulomata.  Asymmetric pattern of dense peripheral airspace opacification in the right lower lobe. Upper Abdomen: Adrenal glands are normal.  No significant findings in the visualized upper abdomen. Soft Tissues/Bones: No

## 2024-08-17 NOTE — PROGRESS NOTES
Pt a/o x4. VSS. All discharge and follow up instructions given to the patient and wife. IV's removed The patient and wife verbalizes understanding and denies questions.  All belongings collected and sent with the patient and wife. The patient was discharged off the unit by wheelchair and PCA in stable condition.

## 2024-08-17 NOTE — CARE COORDINATION
CASE MANAGEMENT DISCHARGE SUMMARY      Discharge to: Home with referral to [facility]    Transportation:    Family/car: yes    Confirmed discharge plan with:     Patient: yes      Family: no per pt     RN, name: Aline RN    Will provide pt eliquis card    Note: Discharging nurse to complete ABHI, reconcile AVS, and place final copy with patient's discharge packet. RN to ensure that written prescriptions for  Level II medications are sent with patient to the facility as per protocol.

## 2024-08-19 NOTE — CARE COORDINATION
CMA received a call from [facility] and that they can not SOC until this coming Saturday.   [facility] is going to reach out to patient to see if they are okay will starting then or they will find a different agency if not.   JOSE Moser

## 2024-08-20 ENCOUNTER — OFFICE VISIT (OUTPATIENT)
Dept: CARDIOLOGY CLINIC | Age: 89
End: 2024-08-20

## 2024-08-20 VITALS
SYSTOLIC BLOOD PRESSURE: 132 MMHG | OXYGEN SATURATION: 96 % | WEIGHT: 164.6 LBS | HEART RATE: 51 BPM | HEIGHT: 70 IN | DIASTOLIC BLOOD PRESSURE: 64 MMHG | BODY MASS INDEX: 23.56 KG/M2

## 2024-08-20 DIAGNOSIS — I48.0 PAF (PAROXYSMAL ATRIAL FIBRILLATION) (HCC): Primary | ICD-10-CM

## 2024-08-20 DIAGNOSIS — I49.3 PVC (PREMATURE VENTRICULAR CONTRACTION): ICD-10-CM

## 2024-08-20 DIAGNOSIS — Z09 HOSPITAL DISCHARGE FOLLOW-UP: ICD-10-CM

## 2024-08-20 DIAGNOSIS — I42.9 CARDIOMYOPATHY, UNSPECIFIED TYPE (HCC): ICD-10-CM

## 2024-08-20 NOTE — PATIENT INSTRUCTIONS
RECOMMENDATIONS:  Will speak with Jennifer NP regarding plan with abnormal stress test.   Hopeful to be able to give clearance for prostate surgery after speaking with interventional team.   Plans to discuss AF in detail at next visit.   If LVEF remains depressed, you would qualify for defibrillator.  We will reach out to you with further plan.   Follow up in 3 months.

## 2024-08-20 NOTE — TELEPHONE ENCOUNTER
Care Transitions Initial Follow Up Call    Call within 2 business days of discharge: Yes     Patient: Mani Chirinos Patient : 1935 MRN: [id number]    [unfilled]    RARS: Readmission Risk Score: 16.8       Spoke with: patient     Discharge department/facility: home    Non-face-to-face services provided:  Scheduled appointment with PCP-24  cardiology    Spoke to patient for hospital follow up.  Pt denies any questions or concerns about medications and discharge instructions.       Follow Up  Future Appointments   Date Time Provider Department Center   2024  3:30 PM CHON Singh Jr., MD [facility]   2024 11:30 AM Jazlyn Anguiano, APRN - CNP [facility]IE Lugo RN

## 2024-08-20 NOTE — PROGRESS NOTES
[facility]   Electrophysiology Consult Note            Date: 8/20/24  Patient Name: Mani Chirinos  YOB: 1935    Primary Care Physician: Zander Ko III, MD    CHIEF COMPLAINT:   Chief Complaint   Patient presents with    Follow-Up from Hospital    Atrial Fibrillation    Fatigue    Shortness of Breath     HISTORY OF PRESENT ILLNESS: Mani Chirinos is a 89 y.o. male who presents for evaluation for AF and PVC's. Patient was originally admitted in 7/2024 with SOB. Event monitor at discharge showed 25% PVC burden and 13% PAC burden, and PAF. Patient was readmitted in 8/2024 with acute CHF. EP was consulted for AF and PVC's. Patient was started on amiodarone. Stress test was not completely normal and LVEF was 30-35%.    Today, 8/20/2024, EKG demonstrates SR 63 bpm. He reports that he is doing ok. He is dealing with prostate issues and has had a catheter for about 5 weeks now. He is taking his medications as prescribed. Denies recent issues with bleeding or bruising. Patient denies current edema, chest pain, shortness of breath, palpitations, dizziness or syncope.     Past Medical History:   has a past medical history of Acid reflux, Asthenia pigmentosa (HCC), BPH (benign prostatic hyperplasia), Cancer (HCC), CHF (congestive heart failure) (HCC), History of blood transfusion, Hyperlipidemia, Hypertension, Irregular heart beat, Kidney stones, Legal blindness, PONV (postoperative nausea and vomiting), and Retinitis.    Past Surgical History:   has a past surgical history that includes hernia repair (Right); Cholecystectomy; back surgery; TURP; eye surgery (Bilateral); other surgical history (N/A, 10/14/2016); Cystoscopy (N/A, 07/07/2024); and Kidney removal (Right, 06/2024).     Allergies:  Lisinopril, Doxycycline, Ibuprofen, and Pcn [penicillins]    Social History:   reports that he has never smoked. He has never used smokeless tobacco. He reports that he does not drink

## 2024-08-26 ENCOUNTER — TELEPHONE (OUTPATIENT)
Dept: CARDIOLOGY CLINIC | Age: 89
End: 2024-08-26

## 2024-08-26 NOTE — TELEPHONE ENCOUNTER
KURTIS ABEL 8/20/2024  RECOMMENDATIONS:  Will speak with Jennifer MAN regarding plan with abnormal stress test.   Hopeful to be able to give clearance for prostate surgery after speaking with interventional team.   Plans to discuss AF in detail at next visit.   If LVEF remains depressed, you would qualify for defibrillator.  We will reach out to you with further plan.   Follow up in 3 months with me.        MATTK-please advise on clearance for prostate surgery

## 2024-08-26 NOTE — TELEPHONE ENCOUNTER
Good afternoon Pino, I saw this gentleman in office having never met before.  He is really hampered by urologic issues.  I would like to clear him for surgery.  If you disagree please help me come up with plan to get him cleared.  Thanks for help.

## 2024-08-26 NOTE — TELEPHONE ENCOUNTER
Pt called and stated he seen agk on 08/20 and stated agk told him he needed to talk to the rest of the group at [facility] to be sure that pt is safe for prostate procedure. Pt is scheduled to have this done on Friday 08/30 but hasn't heard from our office, please advise

## 2024-08-27 NOTE — TELEPHONE ENCOUNTER
Please help me generate a letter.  Patient at moderate risk for MACE during a low risk procedure.  No further cardiac testing indicated.  Should patient require assistance from cardiology team while/post procedure, we will be ready and willing to assist in any way.  Please make sure patient aware.

## 2024-08-27 NOTE — TELEPHONE ENCOUNTER
Pt called for a status update on clearance. Pt stated that he needs to know by tomorrow if he is able to have his procedure. If not they will have to reschedule it.

## 2024-08-27 NOTE — TELEPHONE ENCOUNTER
Pts daughter called requesting returned call, stated pt and herself are confused why they need to see all specialties w/ in cardiology for clx if [facility] cleared. Please advise

## 2024-08-27 NOTE — TELEPHONE ENCOUNTER
I spoke to Dr. Singh who said he will do clearance/risk assessment letter to help patient today. Thanks.

## 2024-08-28 NOTE — PROGRESS NOTES
Surgery Date and Time: 8/30/24 @ 01:45 pm   Arrival Time:  11:45 am    The instructions given when and if a patient needs to stop oral intake prior to surgery varies. Follow the instructions you were given by your    Surgeon or RN during the Pre-op call.       __X__Nothing to eat or to drink after Midnight the night before the surgery. NO gum, mints, candy or ice chips day of surgery.                  Only take the following medications with a small sip of water the morning of surgery:  metoprolol, amiodarone      Aspirin, Ibuprofen, Advil, Naproxen, Vitamin E and other Anti-inflammatory products and supplements should be stopped for 5 -7days before surgery      or as directed by your physician.      Check with your Surgeon/PCP/Cardiologist regarding stopping Plavix, Coumadin, Eliquis, Lovenox, Effient, Pradaxa, Xarelto, Fragmin or other blood thinners     and follow their instructions.  Medication:   eliquis          Last dose: patient is checking with cardiology, stated Dr. Bourgeois wanted him off 2 days, pt states took 8/28/24, notified      Surgeon's office 8/28/24.       - Do not smoke or vape, and do not drink any alcoholic beverages 24 hours prior to surgery, this includes NA Beer. Refrain from using any recreational drugs,     including non-prescribed prescription drugs.     -You may brush your teeth and gargle the morning of surgery.  DO NOT SWALLOW WATER.    -You MUST plan for a responsible adult to stay on site while you are here and take you home after your surgery. You will not be allowed to leave alone or drive               yourself home. It is requested someone stay with you the first 24 hrs. Your surgery will be cancelled if you do not have a ride home with a responsible adult.    -A parent/legal guardian must accompany a child scheduled for surgery and plan to stay at the hospital until the child is discharged.  Please do not bring other                children with you.    -Please wear simple,

## 2024-08-28 NOTE — PROGRESS NOTES
Notified surgeon's office pt last dose eliquis 8/28/24. Received call from surgeon's office, ok for surgery 8/30/24.

## 2024-08-28 NOTE — PROGRESS NOTES
Mani VARGAS [place]    Age 89 y.o.    male    1935    MRN [id number]    8/30/2024  Arrival Time_____________  OR Time____________59 Min     Procedure(s):  CYSTOSCOPY, TRANSURETHRAL RESECTION  OF THE PROSTATE                      General   Surgeon(s):  Jag Bourgeois, MD      DAY ADMIT ___  SDS/OP ___  OUTPT IN BED ___        Phone [phone] (home)                  PCP _____________________ Phone_________________ Epic ( ) Epic CE ( ) Appt ________    NOTES: _________________________________________ Consult/Cardio _______________    ____________________________________________________________________________    ____________________________________________________________________________  PAT APPT DATE:________ TIME: ________  FAXED QAD: _______  (__) H&P w/ Hospitalist    (__) PAT orders in EPIC    (__) Meet with PAT nurse  __________________________________________________________________________  Preop Nurse phone screen complete: _____________  (__) CBC     (__) W/ DIFF ___________  (__) CT CHEST  __________   (__) Hgb A1C    ___________  (__) CHEST X RAY   __________  (__) LIPID PROFILE  ___________  (__) EKG   __________  (__) PT-INR / APTT  ___________  (__) PFT's   __________  (__) BMP   ___________  (__) CAROTIDS  __________  (__) CMP   ___________  (__) VEIN MAPPING  __________  (__) U/A   ___________  ( X ) HISTORY & PHYSICAL __________  (__) URINE C & S  ___________  (__) CARDIAC CLEARANCE __________  (__) U/A W/ FLEX  ___________  (__) PULM. CLEARANCE __________  (__) SERUM PREGNANCY ___________  (__) Preop Orders in EPIC __________  (__) TYPE & SCREEN __________repeat ( ) (__)  __________________ __________  (__) Albumin   ___________  (__)  __________________ __________  (__) TRANSFERRIN  ___________  (__)  __________________ __________  (__) LIVER PROFILE  ___________  (__) URINE PREG DOS __________  (__) MRSA NASAL SWAB ___________  (__) BLOOD SUGAR DOS __________  (__) SED

## 2024-08-29 ENCOUNTER — ANESTHESIA EVENT (OUTPATIENT)
Dept: OPERATING ROOM | Age: 89
End: 2024-08-29
Payer: MEDICARE

## 2024-08-30 ENCOUNTER — ANESTHESIA (OUTPATIENT)
Dept: OPERATING ROOM | Age: 89
End: 2024-08-30
Payer: MEDICARE

## 2024-08-30 ENCOUNTER — HOSPITAL ENCOUNTER (OUTPATIENT)
Age: 89
Setting detail: OUTPATIENT SURGERY
Discharge: HOME | End: 2024-08-30
Attending: UROLOGY | Admitting: UROLOGY
Payer: MEDICARE

## 2024-08-30 VITALS
DIASTOLIC BLOOD PRESSURE: 69 MMHG | RESPIRATION RATE: 14 BRPM | HEART RATE: 36 BPM | OXYGEN SATURATION: 98 % | WEIGHT: 160.3 LBS | TEMPERATURE: 97.8 F | HEIGHT: 70 IN | SYSTOLIC BLOOD PRESSURE: 135 MMHG | BODY MASS INDEX: 22.95 KG/M2

## 2024-08-30 LAB
EKG ATRIAL RATE: 48 BPM
EKG DIAGNOSIS: NORMAL
EKG P AXIS: 2 DEGREES
EKG P-R INTERVAL: 154 MS
EKG Q-T INTERVAL: 506 MS
EKG QRS DURATION: 100 MS
EKG QTC CALCULATION (BAZETT): 484 MS
EKG R AXIS: -8 DEGREES
EKG T AXIS: 53 DEGREES
EKG VENTRICULAR RATE: 55 BPM
GLUCOSE BLD-MCNC: 103 MG/DL (ref 70–99)
PERFORMED ON: ABNORMAL

## 2024-08-30 PROCEDURE — 3600000013 HC SURGERY LEVEL 3 ADDTL 15MIN: Performed by: UROLOGY

## 2024-08-30 PROCEDURE — 2709999900 HC NON-CHARGEABLE SUPPLY: Performed by: UROLOGY

## 2024-08-30 PROCEDURE — 93005 ELECTROCARDIOGRAM TRACING: CPT | Performed by: UROLOGY

## 2024-08-30 PROCEDURE — 7100000011 HC PHASE II RECOVERY - ADDTL 15 MIN: Performed by: UROLOGY

## 2024-08-30 PROCEDURE — 7100000010 HC PHASE II RECOVERY - FIRST 15 MIN: Performed by: UROLOGY

## 2024-08-30 PROCEDURE — 6360000002 HC RX W HCPCS

## 2024-08-30 PROCEDURE — 3600000003 HC SURGERY LEVEL 3 BASE: Performed by: UROLOGY

## 2024-08-30 RX ORDER — OXYCODONE HYDROCHLORIDE 5 MG/1
5 TABLET ORAL PRN
Status: CANCELLED | OUTPATIENT
Start: 2024-08-30 | End: 2024-08-30

## 2024-08-30 RX ORDER — MIDAZOLAM HYDROCHLORIDE 1 MG/ML
2 INJECTION INTRAMUSCULAR; INTRAVENOUS
Status: DISCONTINUED | OUTPATIENT
Start: 2024-08-30 | End: 2024-08-30 | Stop reason: HOSPADM

## 2024-08-30 RX ORDER — SODIUM CHLORIDE 0.9 % (FLUSH) 0.9 %
5-40 SYRINGE (ML) INJECTION PRN
Status: CANCELLED | OUTPATIENT
Start: 2024-08-30

## 2024-08-30 RX ORDER — LABETALOL HYDROCHLORIDE 5 MG/ML
5 INJECTION, SOLUTION INTRAVENOUS EVERY 10 MIN PRN
Status: CANCELLED | OUTPATIENT
Start: 2024-08-30

## 2024-08-30 RX ORDER — SODIUM CHLORIDE 0.9 % (FLUSH) 0.9 %
5-40 SYRINGE (ML) INJECTION EVERY 12 HOURS SCHEDULED
Status: DISCONTINUED | OUTPATIENT
Start: 2024-08-30 | End: 2024-08-30 | Stop reason: HOSPADM

## 2024-08-30 RX ORDER — APREPITANT 40 MG/1
40 CAPSULE ORAL ONCE
Status: CANCELLED | OUTPATIENT
Start: 2024-08-30 | End: 2024-08-30

## 2024-08-30 RX ORDER — LEVOFLOXACIN 5 MG/ML
500 INJECTION, SOLUTION INTRAVENOUS
Status: DISCONTINUED | OUTPATIENT
Start: 2024-08-30 | End: 2024-08-30 | Stop reason: HOSPADM

## 2024-08-30 RX ORDER — SODIUM CHLORIDE 0.9 % (FLUSH) 0.9 %
5-40 SYRINGE (ML) INJECTION EVERY 12 HOURS SCHEDULED
Status: CANCELLED | OUTPATIENT
Start: 2024-08-30

## 2024-08-30 RX ORDER — OXYCODONE HYDROCHLORIDE 5 MG/1
10 TABLET ORAL PRN
Status: CANCELLED | OUTPATIENT
Start: 2024-08-30 | End: 2024-08-30

## 2024-08-30 RX ORDER — NALOXONE HYDROCHLORIDE 0.4 MG/ML
INJECTION, SOLUTION INTRAMUSCULAR; INTRAVENOUS; SUBCUTANEOUS PRN
Status: CANCELLED | OUTPATIENT
Start: 2024-08-30

## 2024-08-30 RX ORDER — ONDANSETRON 2 MG/ML
4 INJECTION INTRAMUSCULAR; INTRAVENOUS
Status: CANCELLED | OUTPATIENT
Start: 2024-08-30

## 2024-08-30 RX ORDER — SODIUM CHLORIDE, SODIUM LACTATE, POTASSIUM CHLORIDE, CALCIUM CHLORIDE 600; 310; 30; 20 MG/100ML; MG/100ML; MG/100ML; MG/100ML
INJECTION, SOLUTION INTRAVENOUS CONTINUOUS
Status: DISCONTINUED | OUTPATIENT
Start: 2024-08-30 | End: 2024-08-30 | Stop reason: HOSPADM

## 2024-08-30 RX ORDER — SODIUM CHLORIDE 9 MG/ML
INJECTION, SOLUTION INTRAVENOUS PRN
Status: CANCELLED | OUTPATIENT
Start: 2024-08-30

## 2024-08-30 RX ORDER — SODIUM CHLORIDE 9 MG/ML
INJECTION, SOLUTION INTRAVENOUS PRN
Status: DISCONTINUED | OUTPATIENT
Start: 2024-08-30 | End: 2024-08-30 | Stop reason: HOSPADM

## 2024-08-30 RX ORDER — DIPHENHYDRAMINE HYDROCHLORIDE 50 MG/ML
12.5 INJECTION INTRAMUSCULAR; INTRAVENOUS
Status: CANCELLED | OUTPATIENT
Start: 2024-08-30 | End: 2024-08-31

## 2024-08-30 RX ORDER — SODIUM CHLORIDE 0.9 % (FLUSH) 0.9 %
5-40 SYRINGE (ML) INJECTION PRN
Status: DISCONTINUED | OUTPATIENT
Start: 2024-08-30 | End: 2024-08-30 | Stop reason: HOSPADM

## 2024-08-30 RX ORDER — LIDOCAINE HYDROCHLORIDE 10 MG/ML
1 INJECTION, SOLUTION EPIDURAL; INFILTRATION; INTRACAUDAL; PERINEURAL
Status: DISCONTINUED | OUTPATIENT
Start: 2024-08-30 | End: 2024-08-30 | Stop reason: HOSPADM

## 2024-08-30 RX ORDER — APREPITANT 40 MG/1
CAPSULE ORAL
Status: COMPLETED
Start: 2024-08-30 | End: 2024-08-30

## 2024-08-30 RX ADMIN — APREPITANT 40 MG: 40 CAPSULE ORAL at 13:25

## 2024-08-30 NOTE — PROGRESS NOTES
In OR prior to induction, patient exhibited bradycardia to low 30s with frequent PVCs. Due to risk of further cardiac depression with induction in the setting of chronic heart failure w/ EF 35% , case was cancelled after discussion with Dr. Bourgeois. Recommend following up with Dr. Singh for optimization prior to procedure.

## 2024-08-30 NOTE — ANESTHESIA PRE PROCEDURE
Department of Anesthesiology  Preprocedure Note       Name:  Mani Chirinos   Age:  89 y.o.  :  1935                                          MRN:  [id number]         Date:  2024      Surgeon: Surgeon(s):  Jag Bourgeois MD    Procedure: Procedure(s):  CYSTOSCOPY, TRANSURETHRAL RESECTION  OF THE PROSTATE    Medications prior to admission:   Prior to Admission medications    Medication Sig Start Date End Date Taking? Authorizing Provider   amiodarone (CORDARONE) 200 MG tablet Take 1 tablet by mouth 2 times daily Then starting 2024 take 1 tablet by mouth daily  Patient taking differently: Take 1 tablet by mouth daily Then starting 2024 take 1 tablet by mouth daily 8/17/24 10/16/24  Elvie Li APRN - CNP   apixaban (ELIQUIS) 2.5 MG TABS tablet Take 1 tablet by mouth 2 times daily 24   Elvie Li APRN - CNP   metoprolol succinate (TOPROL XL) 25 MG extended release tablet Take 1 tablet by mouth daily 24   Elvie Li APRN - CNP   furosemide (LASIX) 20 MG tablet Take 1 tablet by mouth daily For weight > 161 pounds  Patient taking differently: Take 1 tablet by mouth daily as needed (weight gain) Indications: weight gain For weight > 161 pounds 24   Elvie Li APRN - CNP   Calcium Carbonate-Vitamin D (CALCIUM-VITAMIN D) 500-200 MG-UNIT per tablet Take 1 tablet by mouth daily    Ann Galaviz MD   Multiple Vitamins-Minerals (THERAPEUTIC MULTIVITAMIN-MINERALS) tablet Take 1 tablet by mouth daily    Ann Galaviz MD   omeprazole (PRILOSEC) 20 MG delayed release capsule Take 1 capsule by mouth daily In am    Ann Galaviz MD   simvastatin (ZOCOR) 20 MG tablet Take 1 tablet by mouth nightly    Ann Galaviz MD       Current medications:    Current Facility-Administered Medications   Medication Dose Route Frequency Provider Last Rate Last Admin   • levoFLOXacin (LEVAQUIN)

## 2024-08-30 NOTE — OP NOTE
Operative Note      Patient: Mani Chirinos  YOB: 1935  MRN: [id number]    Date of Procedure: 8/30/2024    Pre-Op Diagnosis Codes:      * Benign prostatic hyperplasia with lower urinary tract symptoms, symptom details unspecified [N40.1]    Post-Op Diagnosis: Same       Procedure(s):  CYSTOSCOPY, TRANSURETHRAL RESECTION  OF THE PROSTATE -- CANCELED DUE TO BRADYCARDIA (HR IN 30's)    Surgeon(s):  Jag Bourgeois MD    Assistant:   Surgical Assistant: Juma Turner    Anesthesia: None    Estimated Blood Loss (mL): Minimal    Complications: Other: Bradycardia leading to cancellation of the procedure    Specimens:   * No specimens in log *    Implants:  * No implants in log *      Drains:   [REMOVED] Urinary Catheter 08/12/24 (Removed)   $ Urethral catheter insertion Inserted for procedure 08/13/24 0408   Catheter Indications Perioperative use for selected surgical procedures 08/17/24 0813   Site Assessment Pink 08/17/24 0813   Urine Color Yellow 08/17/24 0827   Urine Appearance Clear 08/17/24 0827   Urine Odor Malodorous 08/16/24 0623   Collection Container Standard 08/17/24 0813   Securement Method Securing device (Describe) 08/17/24 0813   Catheter Care  Perineal wipes 08/17/24 0827   Catheter Best Practices  Drainage tube clipped to bed;Catheter secured to thigh;Tamper seal intact;Bag below bladder;Bag not on floor;Lack of dependent loop in tubing;Drainage bag less than half full 08/17/24 0813   Status Draining 08/17/24 0813   Output (mL) 150 mL 08/17/24 0827       Findings:  Infection Present At Time Of Surgery (PATOS) (choose all levels that have infection present):  No infection present  Other Findings: Bradycardia leading to cancellation of the procedure    Detailed Description of Procedure:     Procedure canceled due to brachycardia      Electronically signed by JAG BOURGEOIS MD on 8/30/2024 at 1:59 PM

## 2024-08-30 NOTE — H&P
Urology Attending Admission Note      Reason for Admission: BPH, urinary retention    History: 89 year old male with BPH and bladder cancer in urinary retention with friedman catheter in place here for cystoscopy and TURP    Meds: see med rec  Family History, Social History, Review of Systems:  Reviewed and agreed to as per chart    Exam:    Vitals:  /69   Pulse (!) 36   Temp 97.8 °F (36.6 °C) (Oral)   Resp 14   Ht 1.778 m (5' 10\")   Wt 72.7 kg (160 lb 4.8 oz)   SpO2 98%   BMI 23.00 kg/m²   Temp  Av.8 °F (36.6 °C)  Min: 97.8 °F (36.6 °C)  Max: 97.8 °F (36.6 °C)  No intake or output data in the 24 hours ending 24 1302    Physical:   Well developed, well nourished in no acute distress  Mood indicates no abnormalities. Pt doesn’t appear depressed  Orientated to time and place  Neck is supple, trachea is midline  Respiratory effort is normal  Cardiovascular show no extremity swelling  Abdomen no masses or hernias are palpated, there is no tenderness. Liver and Spleen appear normal.  Skin show no abnormal lesions  Lymph nodes are not palpated in the inguinal, neck, or axillary area.      Labs:  WBC:    Lab Results   Component Value Date/Time    WBC 6.6 2024 05:23 AM     Hemoglobin/Hematocrit:    Lab Results   Component Value Date/Time    HGB 12.5 2024 05:23 AM    HCT 39.0 2024 05:23 AM     BMP:    Lab Results   Component Value Date/Time     2024 06:50 AM    K 4.0 2024 06:50 AM    K 4.3 2024 04:56 AM    CL 99 2024 06:50 AM    CO2 24 2024 06:50 AM    BUN 29 2024 06:50 AM    CREATININE 1.7 2024 06:50 AM    CALCIUM 9.2 2024 06:50 AM    GFRAA >60 10/25/2016 06:31 AM    LABGLOM 38 2024 06:50 AM    LABGLOM 32 2023 08:39 AM     PT/INR:    Lab Results   Component Value Date/Time    PROTIME 15.2 2024 07:12 PM    INR 1.18 2024 07:12 PM     PTT:    Lab Results   Component Value Date/Time    APTT 29.9 2024

## 2024-09-02 NOTE — ANESTHESIA POSTPROCEDURE EVALUATION
Department of Anesthesiology  Postprocedure Note    Patient: Mani Chirinos  MRN: [id number]  YOB: 1935  Date of evaluation: 9/2/2024    Procedure Summary       Date: 08/30/24 Room / Location: [address]    Anesthesia Start: 1331 Anesthesia Stop: 1347    Procedure: CYSTOSCOPY, TRANSURETHRAL RESECTION  OF THE PROSTATE (Urethra) Diagnosis:       Benign prostatic hyperplasia with lower urinary tract symptoms, symptom details unspecified      (Benign prostatic hyperplasia with lower urinary tract symptoms, symptom details unspecified [N40.1])    Surgeons: Jag Bourgeois MD Responsible Provider: Yair Mcdonnell MD    Anesthesia Type: general ASA Status: 3            Anesthesia Type: No value filed.    Héctor Phase I: Héctor Score: 10    Héctor Phase II: Héctor Score: 10    Anesthesia Post Evaluation    Comments: Case was cancelled in the OR. See progress note for details. Discussed case with Dr. Singh. Dr. Bourgeois, Dr. Singh, and anesthesia to coordinate for Cardiac Consult in pre-op on day of surgery to ensure optimization.       No notable events documented.

## 2024-09-03 ENCOUNTER — OFFICE VISIT (OUTPATIENT)
Dept: CARDIOLOGY CLINIC | Age: 89
End: 2024-09-03
Payer: MEDICARE

## 2024-09-03 VITALS
SYSTOLIC BLOOD PRESSURE: 142 MMHG | HEART RATE: 52 BPM | WEIGHT: 169.5 LBS | BODY MASS INDEX: 24.26 KG/M2 | OXYGEN SATURATION: 97 % | DIASTOLIC BLOOD PRESSURE: 66 MMHG | HEIGHT: 70 IN

## 2024-09-03 DIAGNOSIS — I48.0 PAF (PAROXYSMAL ATRIAL FIBRILLATION) (HCC): ICD-10-CM

## 2024-09-03 DIAGNOSIS — I42.9 CARDIOMYOPATHY, UNSPECIFIED TYPE (HCC): ICD-10-CM

## 2024-09-03 DIAGNOSIS — N18.30 STAGE 3 CHRONIC KIDNEY DISEASE, UNSPECIFIED WHETHER STAGE 3A OR 3B CKD (HCC): ICD-10-CM

## 2024-09-03 DIAGNOSIS — I47.29 NSVT (NONSUSTAINED VENTRICULAR TACHYCARDIA) (HCC): ICD-10-CM

## 2024-09-03 DIAGNOSIS — I50.23 ACUTE ON CHRONIC SYSTOLIC HF (HEART FAILURE) (HCC): ICD-10-CM

## 2024-09-03 DIAGNOSIS — I27.20 PULMONARY HTN (HCC): ICD-10-CM

## 2024-09-03 DIAGNOSIS — I49.3 PVC (PREMATURE VENTRICULAR CONTRACTION): Primary | ICD-10-CM

## 2024-09-03 DIAGNOSIS — I35.0 AORTIC VALVE STENOSIS, ETIOLOGY OF CARDIAC VALVE DISEASE UNSPECIFIED: ICD-10-CM

## 2024-09-03 DIAGNOSIS — R00.1 BRADYCARDIA: ICD-10-CM

## 2024-09-03 PROCEDURE — G8420 CALC BMI NORM PARAMETERS: HCPCS | Performed by: NURSE PRACTITIONER

## 2024-09-03 PROCEDURE — 93000 ELECTROCARDIOGRAM COMPLETE: CPT | Performed by: NURSE PRACTITIONER

## 2024-09-03 PROCEDURE — 1111F DSCHRG MED/CURRENT MED MERGE: CPT | Performed by: NURSE PRACTITIONER

## 2024-09-03 PROCEDURE — G8427 DOCREV CUR MEDS BY ELIG CLIN: HCPCS | Performed by: NURSE PRACTITIONER

## 2024-09-03 PROCEDURE — 99214 OFFICE O/P EST MOD 30 MIN: CPT | Performed by: NURSE PRACTITIONER

## 2024-09-03 PROCEDURE — 1123F ACP DISCUSS/DSCN MKR DOCD: CPT | Performed by: NURSE PRACTITIONER

## 2024-09-03 PROCEDURE — 1036F TOBACCO NON-USER: CPT | Performed by: NURSE PRACTITIONER

## 2024-09-03 NOTE — PATIENT INSTRUCTIONS
Kidney doctor: [facility]:   Dr. Robles    Continue same medications    Advise anesthesia to call and have cardiac EP on arrival to hospital to see prior to surgery on Friday

## 2024-09-03 NOTE — PROGRESS NOTES
[facility]  Office Visit    Mani Chirinos  1935    September 3, 2024    CC:   Chief Complaint   Patient presents with    Follow-up    Cardiomyopathy    Congestive Heart Failure     HPI:  The patient is 89 y.o. male with a past medical history notable for bladder cancer, Kidney cancer and S/P laparoscopic nephroureterectomy 6/2024, HTN, blindness,  PSVT, PAF, CHF  who is here for hospital follow up. He was hospitalized at [facility] from 8/12/24-8/17/24 after presenting with  SOB. He was noted to have acute on chronic CHF and atrial fib and PVC's. Treated for CHF and PNA. Diuresed with IV lasix. Echo suggested EF 30-35% and lexiscan-Myoview with small area of ischemia vs artifact.  He had issues with elevated Cr and recent kidney removal and therefore GDMT was difficult (on low dose Metoprolol at discharge). Noted to have atrial fib and placed on amiodarone. Also treated for NSVT, TIERA, PVC's, cardiomyopathy. Seen by Jennifer Anguiano CNP and scheduled to follow with her in HF clinic. He was seen by Dr. Singh since discharge. He was to have urological surgery on 8/30/24, however it was cancelled d/t bradycardia. Here for CHF follow up today.    Scheduled for Friday to undergo TURP. His procedure was aborted d/t bradycardia. Procedure was cancelled by anesthesiologist. Needs clearance from Dr. Singh for Friday, Sept 6th. His PCP thinks his medication is to high a dose.     Overall feeling well from a CHF standpoint. Denies chest pain/discomfort, SOB, orthopnea/PND, cough, palpitations, dizziness, syncope, edema , weight change or claudication.  \" I feel the best I have in a long time.\"  Home nurses have been checking BP and pulse: pulse 31-70.  BP at home: 107/61- 132/35. No regular exercise.    Review of Systems:  Constitutional: Denies  fatigue, weakness, night sweats or fever.   HEENT: Denies new visual changes, ringing in ears, nosebleeds,nasal congestion  Respiratory: Denies new or change

## 2024-09-05 ENCOUNTER — ANESTHESIA EVENT (OUTPATIENT)
Dept: OPERATING ROOM | Age: 89
End: 2024-09-05
Payer: MEDICARE

## 2024-09-05 NOTE — PROGRESS NOTES
Mani Chirinos    Age 89 y.o.    male    1935    MRN [id number]    9/6/2024  Arrival Time_____________  OR Time____________59 Min     Procedure(s):  CYSTOSCOPY TRANSURETHRAL RESECTION PROSTATE                      General   Surgeon(s):  Jag Bourgeois, MD      DAY ADMIT ___  SDS/OP ___  OUTPT IN BED ___        Phone [phone] (home)                  PCP _____________________ Phone_________________ Epic ( ) Epic CE ( ) Appt ________    NOTES: _________________________________________ Consult/Cardio _______________    ____________________________________________________________________________    ____________________________________________________________________________  PAT APPT DATE:________ TIME: ________  FAXED QAD: _______  (__) H&P w/ Hospitalist    (__) PAT orders in EPIC    (__) Meet with PAT nurse  __________________________________________________________________________  Preop Nurse phone screen complete: _____________  (__) CBC     (__) W/ DIFF ___________  (__) CT CHEST  __________   (__) Hgb A1C    ___________  (__) CHEST X RAY   __________  (__) LIPID PROFILE  ___________  (__) EKG   __________  (__) PT-INR / APTT  ___________  (__) PFT's   __________  (__) BMP   ___________  (__) CAROTIDS  __________  (__) CMP   ___________  (__) VEIN MAPPING  __________  (__) U/A   ___________  ( X ) HISTORY & PHYSICAL __________  (__) URINE C & S  ___________  (__) CARDIAC CLEARANCE __________  (__) U/A W/ FLEX  ___________  (__) PULM. CLEARANCE __________  (__) SERUM PREGNANCY ___________  (__) Preop Orders in EPIC __________  (__) TYPE & SCREEN __________repeat ( ) (__)  __________________ __________  (__) Albumin   ___________  (__)  __________________ __________  (__) TRANSFERRIN  ___________  (__)  __________________ __________  (__) LIVER PROFILE  ___________  (__) URINE PREG DOS __________  (__) MRSA NASAL SWAB ___________  (__) BLOOD SUGAR DOS __________  (__) SED

## 2024-09-05 NOTE — PROGRESS NOTES
Surgery Date and Time: 9/6/24 3:00 pm    Arrival Time: 1:00 pm    The instructions given when and if a patient needs to stop oral intake prior to surgery varies. Follow the instructions you were given by your    Surgeon or RN during the Pre-op call.       __X__Nothing to eat or to drink after Midnight the night before the surgery. NO gum, mints, candy or ice chips day of surgery.                              Only take the following medications with a small sip of water the morning of surgery: metoprolol and omeprazole                 Hold the following medications (per anesthesia or surgeon request):        Last Dose:      Aspirin, Ibuprofen, Advil, Naproxen, Vitamin E and other Anti-inflammatory products and supplements should be stopped for 5 -7days before surgery      or as directed by your physician.      Check with your Surgeon/PCP/Cardiologist regarding stopping Plavix, Coumadin, Eliquis, Lovenox, Effient, Pradaxa, Xarelto, Fragmin or other blood thinners     and follow their instructions.  Medication: eliquis             Last dose: 9/3/24 as directed by cardiology                - If you take a long acting-insulin, take your normal dose the night before surgery.      - Do not smoke or vape, and do not drink any alcoholic beverages 24 hours prior to surgery, this includes NA Beer. Refrain from using any recreational drugs,     including non-prescribed prescription drugs.     -You may brush your teeth and gargle the morning of surgery.  DO NOT SWALLOW WATER.    -You MUST plan for a responsible adult to stay on site while you are here and take you home after your surgery. You will not be allowed to leave alone or drive               yourself home. It is requested someone stay with you the first 24 hrs. Your surgery will be cancelled if you do not have a ride home with a responsible adult.    -A parent/legal guardian must accompany a child scheduled for surgery and plan to stay at the hospital until the child

## 2024-09-06 ENCOUNTER — HOSPITAL ENCOUNTER (OUTPATIENT)
Age: 89
Setting detail: OBSERVATION
Discharge: HOME | End: 2024-09-07
Attending: UROLOGY | Admitting: UROLOGY
Payer: MEDICARE

## 2024-09-06 ENCOUNTER — ANESTHESIA (OUTPATIENT)
Dept: OPERATING ROOM | Age: 89
End: 2024-09-06
Payer: MEDICARE

## 2024-09-06 DIAGNOSIS — N40.1 BENIGN PROSTATIC HYPERPLASIA WITH LOWER URINARY TRACT SYMPTOMS, SYMPTOM DETAILS UNSPECIFIED: ICD-10-CM

## 2024-09-06 PROBLEM — N13.8 ENLARGED PROSTATE WITH URINARY OBSTRUCTION: Status: ACTIVE | Noted: 2024-09-06

## 2024-09-06 LAB
ABO + RH BLD: NORMAL
BLD GP AB SCN SERPL QL: NORMAL
REASON FOR REJECTION: NORMAL
REJECTED TEST: NORMAL

## 2024-09-06 PROCEDURE — 86900 BLOOD TYPING SEROLOGIC ABO: CPT

## 2024-09-06 PROCEDURE — 2580000003 HC RX 258: Performed by: UROLOGY

## 2024-09-06 PROCEDURE — 3600000004 HC SURGERY LEVEL 4 BASE: Performed by: UROLOGY

## 2024-09-06 PROCEDURE — 6370000000 HC RX 637 (ALT 250 FOR IP): Performed by: UROLOGY

## 2024-09-06 PROCEDURE — 3700000000 HC ANESTHESIA ATTENDED CARE: Performed by: UROLOGY

## 2024-09-06 PROCEDURE — G0378 HOSPITAL OBSERVATION PER HR: HCPCS

## 2024-09-06 PROCEDURE — 6360000002 HC RX W HCPCS: Performed by: NURSE ANESTHETIST, CERTIFIED REGISTERED

## 2024-09-06 PROCEDURE — 51700 IRRIGATION OF BLADDER: CPT

## 2024-09-06 PROCEDURE — 3600000014 HC SURGERY LEVEL 4 ADDTL 15MIN: Performed by: UROLOGY

## 2024-09-06 PROCEDURE — 2580000003 HC RX 258: Performed by: NURSE ANESTHETIST, CERTIFIED REGISTERED

## 2024-09-06 PROCEDURE — 86850 RBC ANTIBODY SCREEN: CPT

## 2024-09-06 PROCEDURE — 86901 BLOOD TYPING SEROLOGIC RH(D): CPT

## 2024-09-06 PROCEDURE — 6360000002 HC RX W HCPCS: Performed by: UROLOGY

## 2024-09-06 PROCEDURE — 2500000003 HC RX 250 WO HCPCS: Performed by: NURSE ANESTHETIST, CERTIFIED REGISTERED

## 2024-09-06 PROCEDURE — 2720000010 HC SURG SUPPLY STERILE: Performed by: UROLOGY

## 2024-09-06 PROCEDURE — 99223 1ST HOSP IP/OBS HIGH 75: CPT | Performed by: INTERNAL MEDICINE

## 2024-09-06 PROCEDURE — 7100000001 HC PACU RECOVERY - ADDTL 15 MIN: Performed by: UROLOGY

## 2024-09-06 PROCEDURE — 7100000000 HC PACU RECOVERY - FIRST 15 MIN: Performed by: UROLOGY

## 2024-09-06 PROCEDURE — 3700000001 HC ADD 15 MINUTES (ANESTHESIA): Performed by: UROLOGY

## 2024-09-06 PROCEDURE — 2709999900 HC NON-CHARGEABLE SUPPLY: Performed by: UROLOGY

## 2024-09-06 PROCEDURE — 88305 TISSUE EXAM BY PATHOLOGIST: CPT

## 2024-09-06 RX ORDER — ATORVASTATIN CALCIUM 10 MG/1
10 TABLET, FILM COATED ORAL NIGHTLY
Status: DISCONTINUED | OUTPATIENT
Start: 2024-09-06 | End: 2024-09-07 | Stop reason: HOSPADM

## 2024-09-06 RX ORDER — FUROSEMIDE 20 MG
20 TABLET ORAL DAILY PRN
Status: DISCONTINUED | OUTPATIENT
Start: 2024-09-06 | End: 2024-09-07 | Stop reason: HOSPADM

## 2024-09-06 RX ORDER — PROPOFOL 10 MG/ML
INJECTION, EMULSION INTRAVENOUS PRN
Status: DISCONTINUED | OUTPATIENT
Start: 2024-09-06 | End: 2024-09-06 | Stop reason: SDUPTHER

## 2024-09-06 RX ORDER — TRAMADOL HYDROCHLORIDE 50 MG/1
50 TABLET ORAL EVERY 6 HOURS PRN
Status: DISCONTINUED | OUTPATIENT
Start: 2024-09-06 | End: 2024-09-07 | Stop reason: HOSPADM

## 2024-09-06 RX ORDER — ONDANSETRON 2 MG/ML
4 INJECTION INTRAMUSCULAR; INTRAVENOUS
Status: DISCONTINUED | OUTPATIENT
Start: 2024-09-06 | End: 2024-09-06 | Stop reason: HOSPADM

## 2024-09-06 RX ORDER — MAGNESIUM SULFATE IN WATER 40 MG/ML
2000 INJECTION, SOLUTION INTRAVENOUS PRN
Status: DISCONTINUED | OUTPATIENT
Start: 2024-09-06 | End: 2024-09-07 | Stop reason: HOSPADM

## 2024-09-06 RX ORDER — POLYETHYLENE GLYCOL 3350 17 G/17G
17 POWDER, FOR SOLUTION ORAL DAILY PRN
Status: DISCONTINUED | OUTPATIENT
Start: 2024-09-06 | End: 2024-09-07 | Stop reason: HOSPADM

## 2024-09-06 RX ORDER — EPHEDRINE SULFATE 50 MG/ML
INJECTION INTRAVENOUS PRN
Status: DISCONTINUED | OUTPATIENT
Start: 2024-09-06 | End: 2024-09-06 | Stop reason: SDUPTHER

## 2024-09-06 RX ORDER — LABETALOL HYDROCHLORIDE 5 MG/ML
10 INJECTION, SOLUTION INTRAVENOUS
Status: DISCONTINUED | OUTPATIENT
Start: 2024-09-06 | End: 2024-09-06 | Stop reason: HOSPADM

## 2024-09-06 RX ORDER — SODIUM CHLORIDE 0.9 % (FLUSH) 0.9 %
5-40 SYRINGE (ML) INJECTION PRN
Status: DISCONTINUED | OUTPATIENT
Start: 2024-09-06 | End: 2024-09-06 | Stop reason: HOSPADM

## 2024-09-06 RX ORDER — SODIUM CHLORIDE, SODIUM LACTATE, POTASSIUM CHLORIDE, CALCIUM CHLORIDE 600; 310; 30; 20 MG/100ML; MG/100ML; MG/100ML; MG/100ML
INJECTION, SOLUTION INTRAVENOUS CONTINUOUS PRN
Status: DISCONTINUED | OUTPATIENT
Start: 2024-09-06 | End: 2024-09-06 | Stop reason: SDUPTHER

## 2024-09-06 RX ORDER — CEFAZOLIN SODIUM IN 0.9 % NACL 2 G/100 ML
2000 PLASTIC BAG, INJECTION (ML) INTRAVENOUS EVERY 8 HOURS
Status: DISCONTINUED | OUTPATIENT
Start: 2024-09-06 | End: 2024-09-07 | Stop reason: HOSPADM

## 2024-09-06 RX ORDER — MIDAZOLAM HYDROCHLORIDE 1 MG/ML
2 INJECTION INTRAMUSCULAR; INTRAVENOUS
Status: DISCONTINUED | OUTPATIENT
Start: 2024-09-06 | End: 2024-09-06 | Stop reason: HOSPADM

## 2024-09-06 RX ORDER — ROCURONIUM BROMIDE 10 MG/ML
INJECTION, SOLUTION INTRAVENOUS PRN
Status: DISCONTINUED | OUTPATIENT
Start: 2024-09-06 | End: 2024-09-06 | Stop reason: SDUPTHER

## 2024-09-06 RX ORDER — LIDOCAINE HYDROCHLORIDE 20 MG/ML
INJECTION, SOLUTION EPIDURAL; INFILTRATION; INTRACAUDAL; PERINEURAL PRN
Status: DISCONTINUED | OUTPATIENT
Start: 2024-09-06 | End: 2024-09-06 | Stop reason: SDUPTHER

## 2024-09-06 RX ORDER — PROCHLORPERAZINE EDISYLATE 5 MG/ML
5 INJECTION INTRAMUSCULAR; INTRAVENOUS
Status: DISCONTINUED | OUTPATIENT
Start: 2024-09-06 | End: 2024-09-06 | Stop reason: HOSPADM

## 2024-09-06 RX ORDER — POTASSIUM CHLORIDE 1500 MG/1
40 TABLET, EXTENDED RELEASE ORAL PRN
Status: DISCONTINUED | OUTPATIENT
Start: 2024-09-06 | End: 2024-09-07 | Stop reason: HOSPADM

## 2024-09-06 RX ORDER — SODIUM CHLORIDE 9 MG/ML
INJECTION, SOLUTION INTRAVENOUS PRN
Status: DISCONTINUED | OUTPATIENT
Start: 2024-09-06 | End: 2024-09-06 | Stop reason: HOSPADM

## 2024-09-06 RX ORDER — PANTOPRAZOLE SODIUM 40 MG/1
40 TABLET, DELAYED RELEASE ORAL
Status: DISCONTINUED | OUTPATIENT
Start: 2024-09-07 | End: 2024-09-07 | Stop reason: HOSPADM

## 2024-09-06 RX ORDER — SODIUM CHLORIDE 9 MG/ML
INJECTION, SOLUTION INTRAVENOUS CONTINUOUS
Status: DISCONTINUED | OUTPATIENT
Start: 2024-09-06 | End: 2024-09-07 | Stop reason: HOSPADM

## 2024-09-06 RX ORDER — OXYCODONE HYDROCHLORIDE 5 MG/1
5 TABLET ORAL
Status: DISCONTINUED | OUTPATIENT
Start: 2024-09-06 | End: 2024-09-06 | Stop reason: HOSPADM

## 2024-09-06 RX ORDER — FENTANYL CITRATE 50 UG/ML
INJECTION, SOLUTION INTRAMUSCULAR; INTRAVENOUS PRN
Status: DISCONTINUED | OUTPATIENT
Start: 2024-09-06 | End: 2024-09-06 | Stop reason: SDUPTHER

## 2024-09-06 RX ORDER — SODIUM CHLORIDE 0.9 % (FLUSH) 0.9 %
5-40 SYRINGE (ML) INJECTION EVERY 12 HOURS SCHEDULED
Status: DISCONTINUED | OUTPATIENT
Start: 2024-09-06 | End: 2024-09-06 | Stop reason: HOSPADM

## 2024-09-06 RX ORDER — LEVOFLOXACIN 5 MG/ML
500 INJECTION, SOLUTION INTRAVENOUS
Status: COMPLETED | OUTPATIENT
Start: 2024-09-06 | End: 2024-09-07

## 2024-09-06 RX ORDER — DIPHENHYDRAMINE HYDROCHLORIDE 50 MG/ML
12.5 INJECTION INTRAMUSCULAR; INTRAVENOUS
Status: DISCONTINUED | OUTPATIENT
Start: 2024-09-06 | End: 2024-09-06 | Stop reason: HOSPADM

## 2024-09-06 RX ORDER — ONDANSETRON 4 MG/1
4 TABLET, ORALLY DISINTEGRATING ORAL EVERY 8 HOURS PRN
Status: DISCONTINUED | OUTPATIENT
Start: 2024-09-06 | End: 2024-09-07 | Stop reason: HOSPADM

## 2024-09-06 RX ORDER — SODIUM CHLORIDE, SODIUM LACTATE, POTASSIUM CHLORIDE, CALCIUM CHLORIDE 600; 310; 30; 20 MG/100ML; MG/100ML; MG/100ML; MG/100ML
INJECTION, SOLUTION INTRAVENOUS CONTINUOUS
Status: DISCONTINUED | OUTPATIENT
Start: 2024-09-06 | End: 2024-09-06 | Stop reason: HOSPADM

## 2024-09-06 RX ORDER — SODIUM CHLORIDE 9 MG/ML
INJECTION, SOLUTION INTRAVENOUS PRN
Status: DISCONTINUED | OUTPATIENT
Start: 2024-09-06 | End: 2024-09-07 | Stop reason: HOSPADM

## 2024-09-06 RX ORDER — LORAZEPAM 2 MG/ML
0.5 INJECTION INTRAMUSCULAR
Status: DISCONTINUED | OUTPATIENT
Start: 2024-09-06 | End: 2024-09-06 | Stop reason: HOSPADM

## 2024-09-06 RX ORDER — NALOXONE HYDROCHLORIDE 0.4 MG/ML
INJECTION, SOLUTION INTRAMUSCULAR; INTRAVENOUS; SUBCUTANEOUS PRN
Status: DISCONTINUED | OUTPATIENT
Start: 2024-09-06 | End: 2024-09-06 | Stop reason: HOSPADM

## 2024-09-06 RX ORDER — LIDOCAINE HYDROCHLORIDE 10 MG/ML
1 INJECTION, SOLUTION EPIDURAL; INFILTRATION; INTRACAUDAL; PERINEURAL
Status: DISCONTINUED | OUTPATIENT
Start: 2024-09-06 | End: 2024-09-06 | Stop reason: HOSPADM

## 2024-09-06 RX ORDER — POTASSIUM CHLORIDE 7.45 MG/ML
10 INJECTION INTRAVENOUS PRN
Status: DISCONTINUED | OUTPATIENT
Start: 2024-09-06 | End: 2024-09-07 | Stop reason: HOSPADM

## 2024-09-06 RX ORDER — SODIUM CHLORIDE 0.9 % (FLUSH) 0.9 %
5-40 SYRINGE (ML) INJECTION EVERY 12 HOURS SCHEDULED
Status: DISCONTINUED | OUTPATIENT
Start: 2024-09-06 | End: 2024-09-07 | Stop reason: HOSPADM

## 2024-09-06 RX ORDER — SODIUM CHLORIDE 0.9 % (FLUSH) 0.9 %
5-40 SYRINGE (ML) INJECTION PRN
Status: DISCONTINUED | OUTPATIENT
Start: 2024-09-06 | End: 2024-09-07 | Stop reason: HOSPADM

## 2024-09-06 RX ORDER — ONDANSETRON 2 MG/ML
INJECTION INTRAMUSCULAR; INTRAVENOUS PRN
Status: DISCONTINUED | OUTPATIENT
Start: 2024-09-06 | End: 2024-09-06 | Stop reason: SDUPTHER

## 2024-09-06 RX ORDER — AMIODARONE HYDROCHLORIDE 200 MG/1
200 TABLET ORAL DAILY
Status: DISCONTINUED | OUTPATIENT
Start: 2024-09-07 | End: 2024-09-07 | Stop reason: HOSPADM

## 2024-09-06 RX ORDER — MEPERIDINE HYDROCHLORIDE 50 MG/ML
12.5 INJECTION INTRAMUSCULAR; INTRAVENOUS; SUBCUTANEOUS EVERY 5 MIN PRN
Status: DISCONTINUED | OUTPATIENT
Start: 2024-09-06 | End: 2024-09-06 | Stop reason: HOSPADM

## 2024-09-06 RX ORDER — ONDANSETRON 2 MG/ML
4 INJECTION INTRAMUSCULAR; INTRAVENOUS EVERY 6 HOURS PRN
Status: DISCONTINUED | OUTPATIENT
Start: 2024-09-06 | End: 2024-09-07 | Stop reason: HOSPADM

## 2024-09-06 RX ORDER — METOPROLOL SUCCINATE 25 MG/1
25 TABLET, EXTENDED RELEASE ORAL DAILY
Status: DISCONTINUED | OUTPATIENT
Start: 2024-09-07 | End: 2024-09-07 | Stop reason: HOSPADM

## 2024-09-06 RX ORDER — DEXAMETHASONE SODIUM PHOSPHATE 4 MG/ML
INJECTION, SOLUTION INTRA-ARTICULAR; INTRALESIONAL; INTRAMUSCULAR; INTRAVENOUS; SOFT TISSUE PRN
Status: DISCONTINUED | OUTPATIENT
Start: 2024-09-06 | End: 2024-09-06 | Stop reason: SDUPTHER

## 2024-09-06 RX ADMIN — FENTANYL CITRATE 50 MCG: 50 INJECTION, SOLUTION INTRAMUSCULAR; INTRAVENOUS at 16:13

## 2024-09-06 RX ADMIN — ONDANSETRON 4 MG: 2 INJECTION INTRAMUSCULAR; INTRAVENOUS at 15:32

## 2024-09-06 RX ADMIN — LIDOCAINE HYDROCHLORIDE 60 MG: 20 INJECTION, SOLUTION EPIDURAL; INFILTRATION; INTRACAUDAL; PERINEURAL at 15:32

## 2024-09-06 RX ADMIN — SODIUM CHLORIDE, SODIUM LACTATE, POTASSIUM CHLORIDE, AND CALCIUM CHLORIDE: .6; .31; .03; .02 INJECTION, SOLUTION INTRAVENOUS at 15:30

## 2024-09-06 RX ADMIN — SODIUM CHLORIDE: 9 INJECTION, SOLUTION INTRAVENOUS at 18:16

## 2024-09-06 RX ADMIN — SUGAMMADEX 200 MG: 100 INJECTION, SOLUTION INTRAVENOUS at 16:15

## 2024-09-06 RX ADMIN — Medication 2000 MG: at 18:33

## 2024-09-06 RX ADMIN — EPHEDRINE SULFATE 5 MG: 50 INJECTION INTRAVENOUS at 15:47

## 2024-09-06 RX ADMIN — DEXAMETHASONE SODIUM PHOSPHATE 4 MG: 4 INJECTION, SOLUTION INTRAMUSCULAR; INTRAVENOUS at 15:32

## 2024-09-06 RX ADMIN — PROPOFOL 100 MG: 10 INJECTION, EMULSION INTRAVENOUS at 15:32

## 2024-09-06 RX ADMIN — FENTANYL CITRATE 25 MCG: 50 INJECTION, SOLUTION INTRAMUSCULAR; INTRAVENOUS at 16:04

## 2024-09-06 RX ADMIN — FENTANYL CITRATE 25 MCG: 50 INJECTION, SOLUTION INTRAMUSCULAR; INTRAVENOUS at 15:32

## 2024-09-06 RX ADMIN — ATORVASTATIN CALCIUM 10 MG: 10 TABLET, FILM COATED ORAL at 20:20

## 2024-09-06 RX ADMIN — ROCURONIUM BROMIDE 50 MG: 50 INJECTION, SOLUTION INTRAVENOUS at 15:32

## 2024-09-06 RX ADMIN — LEVOFLOXACIN 500 MG: 5 INJECTION, SOLUTION INTRAVENOUS at 15:32

## 2024-09-06 ASSESSMENT — PAIN - FUNCTIONAL ASSESSMENT: PAIN_FUNCTIONAL_ASSESSMENT: 0-10

## 2024-09-06 ASSESSMENT — PAIN SCALES - GENERAL
PAINLEVEL_OUTOF10: 0
PAINLEVEL_OUTOF10: 0

## 2024-09-06 NOTE — PROGRESS NOTES
Patient admitted to [facility] room 2 in preparation for surgery, VSS. Consent confirmed. IV inserted into right FA, LR infusing. Belongings on [facility] cart. NPO since 1700. Family at bedside, phone number in system for text updates, call light within reach.

## 2024-09-06 NOTE — OP NOTE
Operative Note      Patient: Mani Chirinos  YOB: 1935  MRN: [id number]    Date of Procedure: 9/6/2024    Pre-Op Diagnosis Codes:      * Benign prostatic hyperplasia with lower urinary tract symptoms, symptom details unspecified [N40.1]    Post-Op Diagnosis: Same       Procedure(s):  CYSTOSCOPY TRANSURETHRAL RESECTION PROSTATE    Surgeon(s):  Jag Bourgeois MD    Assistant:   Surgical Assistant: Taty Hoffman    Anesthesia: General    Estimated Blood Loss (mL): Minimal    Complications: None    Specimens:   ID Type Source Tests Collected by Time Destination   A : PROSTATE CHIPS Tissue Prostate SURGICAL PATHOLOGY Jag Bourgeois MD 9/6/2024 1613        Implants:  * No implants in log *      Drains:   Urinary Catheter 09/06/24 3 Way (Active)   Catheter Indications Perioperative use for selected surgical procedures 09/06/24 1623   Site Assessment No urethral drainage 09/06/24 1623   Urine Color Cherry 09/06/24 1623   Urine Appearance Clear 09/06/24 1623   Collection Container Standard 09/06/24 1623   Catheter Best Practices  Bag below bladder;Bag not on floor;Lack of dependent loop in tubing;Drainage bag less than half full 09/06/24 1623   Status Continuous bladder irrigation;Patent;Draining 09/06/24 1623   $ Bladder irrigation simple- 1X PER DAY $ Yes 09/06/24 1623   Rate Moderate 09/06/24 1623   Irrigant Normal saline 09/06/24 1623   CBI Irrigation Intake (mL) 2000 mL 09/06/24 1623   CBI Bermudez Output (mL) 2025 mL 09/06/24 1623   CBI Net Output (mL) 25 mL 09/06/24 1623       [REMOVED] Urinary Catheter 08/12/24 (Removed)   $ Urethral catheter insertion Inserted for procedure 08/13/24 0408   Catheter Indications Perioperative use for selected surgical procedures 08/17/24 0813   Site Assessment Pink 08/17/24 0813   Urine Color Yellow 08/17/24 0827   Urine Appearance Clear 08/17/24 0827   Urine Odor Malodorous 08/16/24 0623   Collection Container Standard 08/17/24 0813   Securement

## 2024-09-06 NOTE — CONSULTS
Electrophysiology Consultation   Date: 9/6/2024  Admit Date:  9/6/2024  Reason for Consultation: Tachy-carol syndrome, non-ischemic cardiomyopathy and atrial fibrillation  Consult Requesting Physician: Jag Bourgeois*     No chief complaint on file.    HPI:   Patient is a pleasant 89-year-old male with a medical history notable for paroxysmal atrial fibrillation with tachybradycardia syndrome, high burden premature ventricular tractions, nonischemic cardiomyopathy, hypertension, hyperlipidemia, BPH and a history of bladder cancer who presents from home for cystoscopy and transurethral resection of the prostate.  According to patient he has been doing well outside of his Bermudez catheter.      Patient denies fevers, chest pain, orthopnea, PND, lower extremity edema, abdominal swelling, shortness of breath, dyspnea on exertion, chills, visual changes, headaches, sore throat, cough, abdominal pain, nausea, vomiting, bleeding, bruising, dysuria, muscle/joint pain, confusion, depression, anxiety, skin lesions, etc.    Past Medical History:   Diagnosis Date    Acid reflux     Asthenia pigmentosa (HCC)     BPH (benign prostatic hyperplasia)     Cancer (HCC)     right kidney    CHF (congestive heart failure) (HCC)     History of blood transfusion     Hyperlipidemia     Hypertension     Irregular heart beat     Kidney stones     Legal blindness     PAF (paroxysmal atrial fibrillation) (HCC)     PONV (postoperative nausea and vomiting)     Retinitis     Wears partial dentures     upper        Past Surgical History:   Procedure Laterality Date    BACK SURGERY      CHOLECYSTECTOMY      CYSTOSCOPY N/A 07/07/2024    CYSTOSCOPY, CLOT EVACUATION, TRANSURETHRAL RESECTION OF BLADDER TUMOR performed by Jag Bourgeois MD at [facility] OR    EYE SURGERY Bilateral     cataract    HERNIA REPAIR Right     INGUINAL    KIDNEY REMOVAL Right 06/2024    OTHER SURGICAL HISTORY N/A 10/14/2016    LUMBAR LAMINECTOMY AND INSTRUMENTED  Plan:  1.  Tachy-carol syndrome.  Patient is a pleasant 89-year-old male with a medical history significant for atrial fibrillation with tachybradycardia syndrome, high burden premature ventricular tractions, nonischemic cardiomyopathy, hypertension, hyperlipidemia, and BPH who presents from home for cystoscopy and TURP.  Previously used procedure was canceled due to bradycardia.  He is at moderate risk for MACE during a low risk procedure.  Based on his patient's age and quality of life both he and the cardiology/EP team agreed to moving forward with his urologic procedure.  Patient continues to be bradycardic.  He is maintaining his pressures.  We suggest moving forward.  Would suggest either dopamine or dobutamine.  Would not target heart rate but more mapped at this point.  If any cardiac issues arise we will be available to assist in any way.  - If bradycardic and or symptomatic with pauses please consider the following:   Dopamine: 5-10mcg/kg/min   Dobutamine: 2-10 mcg/kg/min   Isuprel: 2-10 mcg/min   Epinephrine: 2-10mcg/min  - Restart home medications tomorrow.    2.  Non-ischemic cardiomyopathy with moderately depressed LVEF.  Stable.  No sign of heart failure.  - Plan as per above.    3.  Premature ventricular contractions.    4.  Benign prostate hypertrophy.  - Per urology and anesthesia.    5.  Paroxsymal atrial fibrillation.  - Continue amiodarone.  - Restart OAC when surgically appropriate.    Thank you for allowing me to participate in the care of Mani Chirinos . If you have any questions/comments, please do not hesitate to contact us.    Karel Singh MD  Cardiac Electrophysiology  [facility]  [phone] [place] Office

## 2024-09-06 NOTE — PLAN OF CARE
Problem: Chronic Conditions and Co-morbidities  Goal: Patient's chronic conditions and co-morbidity symptoms are monitored and maintained or improved  Outcome: Progressing  Flowsheets (Taken 9/6/2024 1751)  Care Plan - Patient's Chronic Conditions and Co-Morbidity Symptoms are Monitored and Maintained or Improved:   Monitor and assess patient's chronic conditions and comorbid symptoms for stability, deterioration, or improvement   Collaborate with multidisciplinary team to address chronic and comorbid conditions and prevent exacerbation or deterioration   Update acute care plan with appropriate goals if chronic or comorbid symptoms are exacerbated and prevent overall improvement and discharge     Problem: Discharge Planning  Goal: Discharge to home or other facility with appropriate resources  Outcome: Progressing  Flowsheets (Taken 9/6/2024 1751)  Discharge to home or other facility with appropriate resources:   Identify barriers to discharge with patient and caregiver   Arrange for needed discharge resources and transportation as appropriate   Identify discharge learning needs (meds, wound care, etc)   Refer to discharge planning if patient needs post-hospital services based on physician order or complex needs related to functional status, cognitive ability or social support system   Arrange for interpreters to assist at discharge as needed     Problem: Pain  Goal: Verbalizes/displays adequate comfort level or baseline comfort level  Outcome: Progressing  Flowsheets (Taken 9/6/2024 1751)  Verbalizes/displays adequate comfort level or baseline comfort level:   Encourage patient to monitor pain and request assistance   Assess pain using appropriate pain scale   Implement non-pharmacological measures as appropriate and evaluate response   Administer analgesics based on type and severity of pain and evaluate response     Problem: ABCDS Injury Assessment  Goal: Absence of physical injury  Outcome:

## 2024-09-06 NOTE — H&P
Urology Attending Admission Note      Reason for Admission: BPH, Urinary retention    History: 89 year old male with bladder cancer and urinary retention due to BPH here for cystoscopy and TURP    Meds: see med rec  Family History, Social History, Review of Systems:  Reviewed and agreed to as per chart    Exam:    Vitals:  BP (!) 124/59   Pulse 50   Temp 97.9 °F (36.6 °C) (Oral)   Resp 16   Ht 1.778 m (5' 10\")   Wt 74.8 kg (165 lb)   SpO2 94%   BMI 23.68 kg/m²   Temp  Av.9 °F (36.6 °C)  Min: 97.9 °F (36.6 °C)  Max: 97.9 °F (36.6 °C)  No intake or output data in the 24 hours ending 24 1520    Physical:   Well developed, well nourished in no acute distress  Mood indicates no abnormalities. Pt doesn’t appear depressed  Orientated to time and place  Neck is supple, trachea is midline  Respiratory effort is normal  Cardiovascular show no extremity swelling  Abdomen no masses or hernias are palpated, there is no tenderness. Liver and Spleen appear normal.  Skin show no abnormal lesions  Lymph nodes are not palpated in the inguinal, neck, or axillary area.      Labs:  WBC:    Lab Results   Component Value Date/Time    WBC 6.6 2024 05:23 AM     Hemoglobin/Hematocrit:    Lab Results   Component Value Date/Time    HGB 12.5 2024 05:23 AM    HCT 39.0 2024 05:23 AM     BMP:    Lab Results   Component Value Date/Time     2024 06:50 AM    K 4.0 2024 06:50 AM    K 4.3 2024 04:56 AM    CL 99 2024 06:50 AM    CO2 24 2024 06:50 AM    BUN 29 2024 06:50 AM    CREATININE 1.7 2024 06:50 AM    CALCIUM 9.2 2024 06:50 AM    GFRAA >60 10/25/2016 06:31 AM    LABGLOM 38 2024 06:50 AM    LABGLOM 32 2023 08:39 AM     PT/INR:    Lab Results   Component Value Date/Time    PROTIME 15.2 2024 07:12 PM    INR 1.18 2024 07:12 PM     PTT:    Lab Results   Component Value Date/Time    APTT 29.9 2024 07:12 PM

## 2024-09-06 NOTE — PROGRESS NOTES
Pt brought to PACU. Report obtained from OR RN and anesthesia. Pt placed on monitor NSR and O2 at 100% on RA.

## 2024-09-06 NOTE — ANESTHESIA POSTPROCEDURE EVALUATION
Department of Anesthesiology  Postprocedure Note    Patient: Mani Chirinos  MRN: [id number]  YOB: 1935  Date of evaluation: 9/6/2024    Procedure Summary       Date: 09/06/24 Room / Location: [address]    Anesthesia Start: 1530 Anesthesia Stop: 1620    Procedure: CYSTOSCOPY TRANSURETHRAL RESECTION PROSTATE (Urethra) Diagnosis:       Benign prostatic hyperplasia with lower urinary tract symptoms, symptom details unspecified      (Benign prostatic hyperplasia with lower urinary tract symptoms, symptom details unspecified [N40.1])    Surgeons: Jag Bourgeois MD Responsible Provider: Jonh Christian MD    Anesthesia Type: general ASA Status: 3            Anesthesia Type: No value filed.    Héctor Phase I: Héctor Score: 10    Héctor Phase II:      Anesthesia Post Evaluation    Patient location during evaluation: PACU  Patient participation: complete - patient participated  Level of consciousness: awake and alert  Airway patency: patent  Nausea & Vomiting: no vomiting and no nausea  Cardiovascular status: hemodynamically stable and blood pressure returned to baseline  Respiratory status: acceptable  Hydration status: euvolemic  Comments: ---------------------------               09/06/24                      1715         ---------------------------   BP:          137/65         Pulse:       (!) 46         Resp:          18           Temp:   97.6 °F (36.4 °C)   SpO2:          96%         ---------------------------    Pain management: adequate    No notable events documented.

## 2024-09-06 NOTE — ANESTHESIA PRE PROCEDURE
Department of Anesthesiology  Preprocedure Note       Name:  Mani Chirinos   Age:  89 y.o.  :  1935                                          MRN:  [id number]         Date:  2024      Surgeon: Surgeon(s):  Jag Bourgeois MD    Procedure: Procedure(s):  CYSTOSCOPY TRANSURETHRAL RESECTION PROSTATE    Medications prior to admission:   Prior to Admission medications    Medication Sig Start Date End Date Taking? Authorizing Provider   amiodarone (CORDARONE) 200 MG tablet Take 1 tablet by mouth 2 times daily Then starting 2024 take 1 tablet by mouth daily  Patient taking differently: Take 1 tablet by mouth daily Then starting 2024 take 1 tablet by mouth daily 8/17/24 10/16/24 Yes Elvie Li APRN - CNP   metoprolol succinate (TOPROL XL) 25 MG extended release tablet Take 1 tablet by mouth daily 24  Yes Elvie Li APRN - CNP   Calcium Carbonate-Vitamin D (CALCIUM-VITAMIN D) 500-200 MG-UNIT per tablet Take 1 tablet by mouth daily   Yes Ann Galaviz MD   omeprazole (PRILOSEC) 20 MG delayed release capsule Take 1 capsule by mouth daily In am   Yes Ann Galaviz MD   simvastatin (ZOCOR) 20 MG tablet Take 1 tablet by mouth nightly   Yes Ann Galaviz MD   apixaban (ELIQUIS) 2.5 MG TABS tablet Take 1 tablet by mouth 2 times daily 24   Elvie Li APRN - CNP   furosemide (LASIX) 20 MG tablet Take 1 tablet by mouth daily For weight > 161 pounds  Patient taking differently: Take 1 tablet by mouth daily as needed (weight gain) Indications: weight gain For weight > 161 pounds 24   Elvie Li APRN - CNP   Multiple Vitamins-Minerals (THERAPEUTIC MULTIVITAMIN-MINERALS) tablet Take 1 tablet by mouth daily    ProviderAnn MD       Current medications:    Current Facility-Administered Medications   Medication Dose Route Frequency Provider Last Rate Last Admin   • lidocaine PF 1 %

## 2024-09-07 VITALS
HEART RATE: 57 BPM | OXYGEN SATURATION: 99 % | TEMPERATURE: 97.4 F | RESPIRATION RATE: 16 BRPM | DIASTOLIC BLOOD PRESSURE: 65 MMHG | BODY MASS INDEX: 23.62 KG/M2 | SYSTOLIC BLOOD PRESSURE: 130 MMHG | HEIGHT: 70 IN | WEIGHT: 165 LBS

## 2024-09-07 LAB
ANION GAP SERPL CALCULATED.3IONS-SCNC: 9 MMOL/L (ref 3–16)
BASOPHILS # BLD: 0 K/UL (ref 0–0.2)
BASOPHILS NFR BLD: 0.2 %
BUN SERPL-MCNC: 18 MG/DL (ref 7–20)
CALCIUM SERPL-MCNC: 8.9 MG/DL (ref 8.3–10.6)
CHLORIDE SERPL-SCNC: 104 MMOL/L (ref 99–110)
CO2 SERPL-SCNC: 22 MMOL/L (ref 21–32)
CREAT SERPL-MCNC: 1.5 MG/DL (ref 0.8–1.3)
DEPRECATED RDW RBC AUTO: 18.1 % (ref 12.4–15.4)
EOSINOPHIL # BLD: 0 K/UL (ref 0–0.6)
EOSINOPHIL NFR BLD: 0 %
GFR SERPLBLD CREATININE-BSD FMLA CKD-EPI: 44 ML/MIN/{1.73_M2}
GLUCOSE BLD-MCNC: 124 MG/DL (ref 70–99)
GLUCOSE BLD-MCNC: 151 MG/DL (ref 70–99)
GLUCOSE SERPL-MCNC: 125 MG/DL (ref 70–99)
HCT VFR BLD AUTO: 41.7 % (ref 40.5–52.5)
HGB BLD-MCNC: 13 G/DL (ref 13.5–17.5)
LYMPHOCYTES # BLD: 0.4 K/UL (ref 1–5.1)
LYMPHOCYTES NFR BLD: 6.1 %
MCH RBC QN AUTO: 26.3 PG (ref 26–34)
MCHC RBC AUTO-ENTMCNC: 31.2 G/DL (ref 31–36)
MCV RBC AUTO: 84.4 FL (ref 80–100)
MONOCYTES # BLD: 0.3 K/UL (ref 0–1.3)
MONOCYTES NFR BLD: 3.9 %
NEUTROPHILS # BLD: 5.8 K/UL (ref 1.7–7.7)
NEUTROPHILS NFR BLD: 89.8 %
PERFORMED ON: ABNORMAL
PERFORMED ON: ABNORMAL
PLATELET # BLD AUTO: 162 K/UL (ref 135–450)
PMV BLD AUTO: 8.4 FL (ref 5–10.5)
POTASSIUM SERPL-SCNC: 4.7 MMOL/L (ref 3.5–5.1)
RBC # BLD AUTO: 4.94 M/UL (ref 4.2–5.9)
SODIUM SERPL-SCNC: 135 MMOL/L (ref 136–145)
WBC # BLD AUTO: 6.5 K/UL (ref 4–11)

## 2024-09-07 PROCEDURE — 36415 COLL VENOUS BLD VENIPUNCTURE: CPT

## 2024-09-07 PROCEDURE — 6370000000 HC RX 637 (ALT 250 FOR IP): Performed by: UROLOGY

## 2024-09-07 PROCEDURE — 6360000002 HC RX W HCPCS: Performed by: UROLOGY

## 2024-09-07 PROCEDURE — 85025 COMPLETE CBC W/AUTO DIFF WBC: CPT

## 2024-09-07 PROCEDURE — 80048 BASIC METABOLIC PNL TOTAL CA: CPT

## 2024-09-07 PROCEDURE — G0378 HOSPITAL OBSERVATION PER HR: HCPCS

## 2024-09-07 PROCEDURE — 2580000003 HC RX 258: Performed by: UROLOGY

## 2024-09-07 RX ORDER — CEFDINIR 300 MG/1
300 CAPSULE ORAL 2 TIMES DAILY
Qty: 14 CAPSULE | Refills: 0 | Status: SHIPPED | OUTPATIENT
Start: 2024-09-07 | End: 2024-09-14

## 2024-09-07 RX ADMIN — PANTOPRAZOLE SODIUM 40 MG: 40 TABLET, DELAYED RELEASE ORAL at 05:19

## 2024-09-07 RX ADMIN — METOPROLOL SUCCINATE 25 MG: 25 TABLET, EXTENDED RELEASE ORAL at 09:08

## 2024-09-07 RX ADMIN — Medication 10 ML: at 09:09

## 2024-09-07 RX ADMIN — Medication 2000 MG: at 01:53

## 2024-09-07 RX ADMIN — SODIUM CHLORIDE: 9 INJECTION, SOLUTION INTRAVENOUS at 04:11

## 2024-09-07 RX ADMIN — AMIODARONE HYDROCHLORIDE 200 MG: 200 TABLET ORAL at 09:08

## 2024-09-07 RX ADMIN — Medication 2000 MG: at 10:33

## 2024-09-07 NOTE — PLAN OF CARE
Problem: Chronic Conditions and Co-morbidities  Goal: Patient's chronic conditions and co-morbidity symptoms are monitored and maintained or improved  Outcome: Progressing  Flowsheets (Taken 9/6/2024 1751 by Mallorie Blood, RN)  Care Plan - Patient's Chronic Conditions and Co-Morbidity Symptoms are Monitored and Maintained or Improved:   Monitor and assess patient's chronic conditions and comorbid symptoms for stability, deterioration, or improvement   Collaborate with multidisciplinary team to address chronic and comorbid conditions and prevent exacerbation or deterioration   Update acute care plan with appropriate goals if chronic or comorbid symptoms are exacerbated and prevent overall improvement and discharge     Problem: Discharge Planning  Goal: Discharge to home or other facility with appropriate resources  Outcome: Progressing  Flowsheets (Taken 9/6/2024 1751 by Mallorie Blood, RN)  Discharge to home or other facility with appropriate resources:   Identify barriers to discharge with patient and caregiver   Arrange for needed discharge resources and transportation as appropriate   Identify discharge learning needs (meds, wound care, etc)   Refer to discharge planning if patient needs post-hospital services based on physician order or complex needs related to functional status, cognitive ability or social support system   Arrange for interpreters to assist at discharge as needed     Problem: Pain  Goal: Verbalizes/displays adequate comfort level or baseline comfort level  Outcome: Progressing  Flowsheets (Taken 9/6/2024 1751 by Mallorie Blood, RN)  Verbalizes/displays adequate comfort level or baseline comfort level:   Encourage patient to monitor pain and request assistance   Assess pain using appropriate pain scale   Implement non-pharmacological measures as appropriate and evaluate response   Administer analgesics based on type and severity of pain and evaluate response     Problem: ABCDS Injury

## 2024-09-07 NOTE — CARE COORDINATION
CASE MANAGEMENT DISCHARGE SUMMARY      Discharge to: Home with resumption of [facility]    Transportation:    Family/car: yes    Confirmed discharge plan with:     Patient: yes per RN     Family:  no per pt     RN, name: Shannon RN    Called and confirmed pt is still active with [facility]. Will not need new orders as OBS. Did fax clinicals from this stay to [facility]    Note: Discharging nurse to complete ABHI, reconcile AVS, and place final copy with patient's discharge packet. RN to ensure that written prescriptions for  Level II medications are sent with patient to the facility as per protocol.

## 2024-09-07 NOTE — PROGRESS NOTES
A&Ox4. Denied chest pain/heaviness, /SOB. With intermittent discomfort on 3 way cath insertion site. With 3 way cath, intact and draining connected to continuous bladder irrigation at moderate rate draining to clear reddish output.   SR x 2.   Call light within reach.   Bed on lowest position. Bed alarm on.

## 2024-09-07 NOTE — PROGRESS NOTES
Pt A/O x 4, VSS. Wife bedside. CBI in place, draining pink-tinged fluid. Pt not complaining of any pain today. Pt on room air with O2 saturation of 95-97%. Pt tolerating regular diet with no complaints of nausea. Standard safety precautions in place. Pt voices no concerns at this time. All care per orders.

## 2024-09-07 NOTE — PROGRESS NOTES
Pt a/o x4. VSS. All prescriptions, discharge and follow up instructions given to the patient.  The patient verbalizes understanding and denies questions.  All belongings collected and sent with the patient. The patient was discharged off the unit by wheelchair and nurse in stable condition.

## 2024-10-07 NOTE — PROGRESS NOTES
[facility]  Cardiac Follow-up    Primary Care Doctor:  Zander Ko III, MD    Chief Complaint   Patient presents with    Follow-up    Atrial Fibrillation    Congestive Heart Failure    Cardiomyopathy        History of Present Illness:  I had the pleasure of seeing Mani Chirinos as a follow up of CHF.     PMH urothelial carcinoma bladder, carcinoma of kidney s/p laparoscopic nephroureterectomy 6/2024, HTN, blindness. After kidney removed he had a lot of leg swelling improved with catheter placed.   admitted 7/5/24-7/9/24 with shrotness of breath, possible afib with RVR, treated for PNA, not able to anticoagulated due to hematuria. Echocardiogram 7/8/24 showed LVEF 30-35%. He had 2 week cardiac monitor that showed PSVT, possible afib and has upcoming EP evaluation. He was treated with levaquin until 7/23/2024   Cystocopy 8/9/2024 was cancelled last week after talking with anesthesiology due to     Since last visit, stress test completed. TURP procedure was aborted on 8/30/24 due to bradycardia. Then underwent TURP on 9/6/2024- did well   Seen by nephro 9/18/24 And hem 9/18/2024.  He is doing well overall. No chest pain. Breathing is stable. Having some mild edema of BLE. Only needing lasix PRN. No bleeding. Taking blood pressures and HR at home. At times, reports HR in the 40s.     Mani Chirinos describes symptoms including fatigue but denies chest pain, chest pressure/discomfort, dizziness.     Home weights: 173-175  lbs  Appetite: up  Taking medications as prescribed: Yes  Able to afford medications: Yes    Past Medical History:   has a past medical history of Acid reflux, Asthenia pigmentosa (HCC), BPH (benign prostatic hyperplasia), Cancer (HCC), CHF (congestive heart failure) (HCC), History of blood transfusion, Hyperlipidemia, Hypertension, Irregular heart beat, Kidney stones, Legal blindness, PAF (paroxysmal atrial fibrillation) (HCC), PONV (postoperative nausea and

## 2024-10-08 ENCOUNTER — OFFICE VISIT (OUTPATIENT)
Dept: CARDIOLOGY CLINIC | Age: 89
End: 2024-10-08
Payer: MEDICARE

## 2024-10-08 VITALS
HEART RATE: 55 BPM | OXYGEN SATURATION: 96 % | HEIGHT: 70 IN | WEIGHT: 182 LBS | SYSTOLIC BLOOD PRESSURE: 122 MMHG | BODY MASS INDEX: 26.05 KG/M2 | DIASTOLIC BLOOD PRESSURE: 54 MMHG

## 2024-10-08 DIAGNOSIS — I42.9 CARDIOMYOPATHY, UNSPECIFIED TYPE (HCC): ICD-10-CM

## 2024-10-08 DIAGNOSIS — I49.3 PVC (PREMATURE VENTRICULAR CONTRACTION): ICD-10-CM

## 2024-10-08 DIAGNOSIS — I50.20 HFREF (HEART FAILURE WITH REDUCED EJECTION FRACTION) (HCC): Primary | ICD-10-CM

## 2024-10-08 DIAGNOSIS — R00.1 BRADYCARDIA: ICD-10-CM

## 2024-10-08 PROCEDURE — 1123F ACP DISCUSS/DSCN MKR DOCD: CPT | Performed by: NURSE PRACTITIONER

## 2024-10-08 PROCEDURE — 99214 OFFICE O/P EST MOD 30 MIN: CPT | Performed by: NURSE PRACTITIONER

## 2024-10-08 RX ORDER — METOPROLOL SUCCINATE 25 MG/1
12.5 TABLET, EXTENDED RELEASE ORAL DAILY
Qty: 30 TABLET | Refills: 3
Start: 2024-10-08

## 2024-10-08 RX ORDER — AMIODARONE HYDROCHLORIDE 200 MG/1
200 TABLET ORAL DAILY
Qty: 30 TABLET | Refills: 1 | Status: SHIPPED | OUTPATIENT
Start: 2024-10-08 | End: 2024-12-07

## 2024-10-08 RX ORDER — FUROSEMIDE 20 MG
20 TABLET ORAL DAILY PRN
Qty: 30 TABLET | Refills: 2 | Status: SHIPPED | OUTPATIENT
Start: 2024-10-08

## 2024-10-08 NOTE — PATIENT INSTRUCTIONS
Plan:   Continue lasix 20mg as needed for weight gain 2-3 lbs; take a dose tomorrow   Renew amiodarone- follow up with EP regarding continuing   Adjust toprol to 12.5mg daily given lower heart rate  Follow up with Ep as planned  Follow up with CHF team in 3 months

## 2024-10-28 RX ORDER — METOPROLOL SUCCINATE 25 MG/1
12.5 TABLET, EXTENDED RELEASE ORAL DAILY
Qty: 45 TABLET | Refills: 3 | Status: SHIPPED | OUTPATIENT
Start: 2024-10-28

## 2024-10-28 NOTE — TELEPHONE ENCOUNTER
Refill  metoprolol succinate (TOPROL XL) 25 MG extended release tablet [   [facility] [id number] - [place], [place] - [address] - P [phone] - F [phone]   Lov 10/8/24  Next 1/8/25

## 2024-11-26 ENCOUNTER — OFFICE VISIT (OUTPATIENT)
Dept: CARDIOLOGY CLINIC | Age: 89
End: 2024-11-26

## 2024-11-26 VITALS
HEART RATE: 74 BPM | OXYGEN SATURATION: 97 % | HEIGHT: 70 IN | WEIGHT: 186.8 LBS | SYSTOLIC BLOOD PRESSURE: 120 MMHG | BODY MASS INDEX: 26.74 KG/M2 | DIASTOLIC BLOOD PRESSURE: 62 MMHG

## 2024-11-26 DIAGNOSIS — I47.29 NSVT (NONSUSTAINED VENTRICULAR TACHYCARDIA) (HCC): ICD-10-CM

## 2024-11-26 DIAGNOSIS — I50.21 ACUTE HFREF (HEART FAILURE WITH REDUCED EJECTION FRACTION) (HCC): ICD-10-CM

## 2024-11-26 DIAGNOSIS — I48.0 PAF (PAROXYSMAL ATRIAL FIBRILLATION) (HCC): Primary | ICD-10-CM

## 2024-11-26 NOTE — PROGRESS NOTES
[facility]   Electrophysiology Consult Note            Date: 11/26/24  Patient Name: Mani Chirinos  YOB: 1935    Primary Care Physician: Zander Ko III, MD    CHIEF COMPLAINT:   Chief Complaint   Patient presents with    3 Month Follow-Up    Atrial Fibrillation    Tachycardia     NSVT     Other     PVC      HISTORY OF PRESENT ILLNESS: Mani Chirinos is a 89 y.o. male who presents for evaluation for AF and PVC's. Patient was originally admitted in 7/2024 with SOB. Event monitor at discharge showed 25% PVC burden and 13% PAC burden, and PAF. Patient was readmitted in 8/2024 with acute CHF. EP was consulted for AF and PVC's. Patient was started on amiodarone. Stress test was not completely normal and LVEF was 30-35%.    On 8/20/2024, EKG demonstrated SR 63 bpm. He reported that he is doing ok. He is dealing with prostate issues and has had a catheter for about 5 weeks now.    Interval History since last office visit: He was hospitalized at [facility] 8/2024 after presenting with SOB. He was noted to have acute on chronic CHF and AF and PVC's. Treated for CHF and PNA. Diuresed with IV lasix. Echo suggested EF 30-35% and lexiscan-Myoview with small area of ischemia vs artifact. He had issues with elevated Cr and recent kidney removal and therefore GDMT was difficult (on low dose Metoprolol at discharge). Noted to have AF and placed on amiodarone. Also treated for NSVT, TIERA, PVC's, cardiomyopathy. Seen by Jennifer Anguiano CNP and scheduled to follow with her in HF clinic. He was seen by Dr. Singh since discharge. He was to have urological surgery on 8/30/24, however it was cancelled d/t bradycardia. Here for CHF follow up today. Patient presented to [facility] 9/2024 for cystoscopy and TURP. Previously scheduled procedure was canceled due to bradycardia. EP was consulted to address;  He is at moderate risk for MACE during a low risk procedure. Based on his patient's age and quality of

## 2024-11-26 NOTE — PATIENT INSTRUCTIONS
RECOMMENDATIONS:  Recommend establishing care with Dr. Torres to discuss the need for possible heart cath given reduced LV function.   Discussed the need for possible ICD if LVEF is still <35% 90 days after intervention if indicated.   Follow up with HF team as scheduled.   Follow up with me in 2 months.

## 2024-12-16 NOTE — PROGRESS NOTES
medications for this visit.      Allergies:  Lisinopril, Doxycycline, Ibuprofen, and Pcn [penicillins]     Review of Systems:   A 14 point review of symptoms completed. Pertinent positives identified in the HPI, all other review of symptoms negative as below.      Objective   PHYSICAL EXAM:    Vitals:    12/24/24 0904   BP: (!) 142/50   Pulse: 68   SpO2: 94%    Weight - Scale: 83.9 kg (185 lb)         General Appearance:  Alert, cooperative, no distress, appears stated age, has decrs vision   Head:  Normocephalic, without obvious abnormality, atraumatic   Neck: Supple, no jvp    Lungs:   Clear to auscultation bilaterally, respirations unlabored   Chest Wall:  No deformity or tenderness   Heart:  irregular rate and rhythm, S1, S2 normal, 2/6 sm    Abdomen:   Soft, non-tender, bowel sounds active all four quadrants,  no masses, no organomegaly   Extremities: Extremities normal, atraumatic, no cyanosis or edema   Pulses: 2+ and symmetric   Skin: Skin color, texture, turgor normal, no rashes or lesions   Pysch: Normal mood and affect   Neurologic: Normal gross motor and sensory exam.         Labs   CBC:   Lab Results   Component Value Date/Time    WBC 6.5 09/07/2024 04:50 AM    RBC 4.94 09/07/2024 04:50 AM    HGB 13.0 09/07/2024 04:50 AM    HCT 41.7 09/07/2024 04:50 AM    MCV 84.4 09/07/2024 04:50 AM    RDW 18.1 09/07/2024 04:50 AM     09/07/2024 04:50 AM     CMP:  Lab Results   Component Value Date/Time     09/07/2024 04:50 AM    K 4.7 09/07/2024 04:50 AM     09/07/2024 04:50 AM    CO2 22 09/07/2024 04:50 AM    BUN 18 09/07/2024 04:50 AM    CREATININE 1.5 09/07/2024 04:50 AM    GFRAA >60 10/25/2016 06:31 AM    AGRATIO 1.4 08/12/2024 12:47 PM    LABGLOM 44 09/07/2024 04:50 AM    LABGLOM 32 02/23/2023 08:39 AM    GLUCOSE 125 09/07/2024 04:50 AM    CALCIUM 8.9 09/07/2024 04:50 AM    BILITOT 0.8 08/12/2024 12:47 PM    ALKPHOS 93 08/12/2024 12:47 PM    AST 23 08/12/2024 12:47 PM    ALT 36 08/12/2024 12:47

## 2024-12-24 ENCOUNTER — OFFICE VISIT (OUTPATIENT)
Dept: CARDIOLOGY CLINIC | Age: 89
End: 2024-12-24
Payer: MEDICARE

## 2024-12-24 VITALS
SYSTOLIC BLOOD PRESSURE: 142 MMHG | BODY MASS INDEX: 26.48 KG/M2 | HEART RATE: 68 BPM | HEIGHT: 70 IN | DIASTOLIC BLOOD PRESSURE: 50 MMHG | OXYGEN SATURATION: 94 % | WEIGHT: 185 LBS

## 2024-12-24 DIAGNOSIS — I50.43 CHF (CONGESTIVE HEART FAILURE), NYHA CLASS I, ACUTE ON CHRONIC, COMBINED (HCC): Primary | ICD-10-CM

## 2024-12-24 DIAGNOSIS — I50.9 ACUTE DECOMPENSATED HEART FAILURE (HCC): ICD-10-CM

## 2024-12-24 DIAGNOSIS — I48.0 PAF (PAROXYSMAL ATRIAL FIBRILLATION) (HCC): ICD-10-CM

## 2024-12-24 DIAGNOSIS — I50.21 ACUTE HFREF (HEART FAILURE WITH REDUCED EJECTION FRACTION) (HCC): ICD-10-CM

## 2024-12-24 DIAGNOSIS — I42.9 CARDIOMYOPATHY, UNSPECIFIED TYPE (HCC): ICD-10-CM

## 2024-12-24 DIAGNOSIS — I47.29 NSVT (NONSUSTAINED VENTRICULAR TACHYCARDIA) (HCC): ICD-10-CM

## 2024-12-24 DIAGNOSIS — I49.3 PVC (PREMATURE VENTRICULAR CONTRACTION): ICD-10-CM

## 2024-12-24 PROCEDURE — 1123F ACP DISCUSS/DSCN MKR DOCD: CPT | Performed by: INTERNAL MEDICINE

## 2024-12-24 PROCEDURE — 99214 OFFICE O/P EST MOD 30 MIN: CPT | Performed by: INTERNAL MEDICINE

## 2024-12-24 PROCEDURE — 1159F MED LIST DOCD IN RCRD: CPT | Performed by: INTERNAL MEDICINE

## 2024-12-24 NOTE — PATIENT INSTRUCTIONS
Continue current cardiac medications: Amiodarone 200 mg, Eliquis 2.5 mg, Lasix 20 mg, Metoprolol 25 mg, Simvastatin 20 mg   Medications reviewed. Medications refilled as warranted.   Discussed watchman device to come off of Eliquis; Discuss with Dr. Singh at upcoming OV.   Discussed medical mgmt vs heart catheterization; would like to move forward with cath. Will need clearance from Dr. Robles with nephrology. Will need to come in early for fluids. Right and left heart catheterization- you will hear from Mila our .         Medication instructions:        No vitamins or minerals the morning of the procedure.         No OTC medications morning of procedure.        Hold Eliquis 48 hours prior to procedure.        Hold Lasix the morning of procedure         Hold Prilosec the morning of the procedure    Follow up after procedure.

## 2025-01-28 ENCOUNTER — TELEPHONE (OUTPATIENT)
Dept: CARDIOLOGY | Age: 89
End: 2025-01-28

## 2025-01-28 ENCOUNTER — OFFICE VISIT (OUTPATIENT)
Dept: CARDIOLOGY CLINIC | Age: 89
End: 2025-01-28
Payer: MEDICARE

## 2025-01-28 VITALS
HEIGHT: 70 IN | HEART RATE: 62 BPM | DIASTOLIC BLOOD PRESSURE: 62 MMHG | BODY MASS INDEX: 26.08 KG/M2 | SYSTOLIC BLOOD PRESSURE: 122 MMHG | OXYGEN SATURATION: 98 % | WEIGHT: 182.2 LBS

## 2025-01-28 DIAGNOSIS — I47.29 NSVT (NONSUSTAINED VENTRICULAR TACHYCARDIA) (HCC): ICD-10-CM

## 2025-01-28 DIAGNOSIS — I48.0 PAF (PAROXYSMAL ATRIAL FIBRILLATION) (HCC): Primary | ICD-10-CM

## 2025-01-28 DIAGNOSIS — I49.3 PVC (PREMATURE VENTRICULAR CONTRACTION): ICD-10-CM

## 2025-01-28 PROCEDURE — 1160F RVW MEDS BY RX/DR IN RCRD: CPT | Performed by: INTERNAL MEDICINE

## 2025-01-28 PROCEDURE — 1123F ACP DISCUSS/DSCN MKR DOCD: CPT | Performed by: INTERNAL MEDICINE

## 2025-01-28 PROCEDURE — 93000 ELECTROCARDIOGRAM COMPLETE: CPT | Performed by: INTERNAL MEDICINE

## 2025-01-28 PROCEDURE — 99214 OFFICE O/P EST MOD 30 MIN: CPT | Performed by: INTERNAL MEDICINE

## 2025-01-28 PROCEDURE — G8419 CALC BMI OUT NRM PARAM NOF/U: HCPCS | Performed by: INTERNAL MEDICINE

## 2025-01-28 PROCEDURE — 1159F MED LIST DOCD IN RCRD: CPT | Performed by: INTERNAL MEDICINE

## 2025-01-28 PROCEDURE — G8427 DOCREV CUR MEDS BY ELIG CLIN: HCPCS | Performed by: INTERNAL MEDICINE

## 2025-01-28 PROCEDURE — 1036F TOBACCO NON-USER: CPT | Performed by: INTERNAL MEDICINE

## 2025-01-28 NOTE — PATIENT INSTRUCTIONS
RECOMMENDATIONS:  We will reach out to Dr. Torres regarding scheduling LHC/nephrology clearance.   Continue cardiac medications as prescribed.   Follow up with me in 4 months.

## 2025-01-28 NOTE — PROGRESS NOTES
[facility]   Electrophysiology Consult Note            Date: 1/28/25  Patient Name: Mani Chirinos  YOB: 1935    Primary Care Physician: Zander Ko III, MD    CHIEF COMPLAINT:   Chief Complaint   Patient presents with    Follow-up     2 month follow up     Atrial Fibrillation    Tachycardia     NSVT     Other     PVC      HISTORY OF PRESENT ILLNESS: Mani Chirinos is a 89 y.o. male who presents for evaluation for AF and PVC's. Patient was originally admitted in 7/2024 with SOB. Event monitor at discharge showed 25% PVC burden and 13% PAC burden, and PAF. Patient was readmitted in 8/2024 with acute CHF. EP was consulted for AF and PVC's. Patient was started on amiodarone. Stress test was not completely normal and LVEF was 30-35%.    On 8/20/2024, EKG demonstrated SR 63 bpm. He reported that he is doing ok. He is dealing with prostate issues and has had a catheter for about 5 weeks now.    He was hospitalized at [facility] 8/2024 after presenting with SOB. He was noted to have acute on chronic CHF and AF and PVC's. Treated for CHF and PNA. Diuresed with IV lasix. Echo suggested EF 30-35% and lexiscan-Myoview with small area of ischemia vs artifact. He had issues with elevated Cr and recent kidney removal and therefore GDMT was difficult (on low dose Metoprolol at discharge). Noted to have AF and placed on amiodarone. Also treated for NSVT, TIERA, PVC's, cardiomyopathy. Seen by Jennifer Anguiano CNP and scheduled to follow with her in HF clinic. He was seen by Dr. Singh since discharge. He was to have urological surgery on 8/30/24, however it was cancelled d/t bradycardia. Here for CHF follow up today. Patient presented to [facility] 9/2024 for cystoscopy and TURP. Previously scheduled procedure was canceled due to bradycardia. EP was consulted to address;  He is at moderate risk for MACE during a low risk procedure. Based on his patient's age and quality of life both he and the

## 2025-01-28 NOTE — TELEPHONE ENCOUNTER
Pt would like to proceed with rt/lt ht cath, lets get renal clearance and schedule cath and have him come in early for IVFs, Thank you.

## 2025-01-28 NOTE — TELEPHONE ENCOUNTER
----- Message from Dr. CHON Singh MD sent at 1/28/2025  2:13 PM EST -----  Dr. Torres I was able to see our mutual patient today.  He still has not heard back as far as left heart catheterization and nephrology's plan.  I told him we would come up with a plan this week and reach out to him given his depressed left ventricular ejection fraction.  What is your thoughts?  Are you okay moving forward with a left heart catheterization?

## 2025-01-31 NOTE — TELEPHONE ENCOUNTER
Attempted to call the [facility] regarding clearance; no answer. Will re attempt on Monday.

## 2025-02-11 NOTE — TELEPHONE ENCOUNTER
Awaiting clearance from [facility]; called for clearance again. They will speak with Dr. Robles and since clearance if appropriate.

## 2025-02-11 NOTE — TELEPHONE ENCOUNTER
Pt would like to proceed with rt/lt ht cath, lets get renal clearance and schedule cath and have him come in early for IVFs, Thank you

## 2025-02-11 NOTE — TELEPHONE ENCOUNTER
Chris from Dr. Robles's office called back. She said the pt has not been seen in the office recently and they are not able to give the cardiac clearance until he is seen. He has an upcoming appt on 2/19/25 and will also be getting blood work done on 2/17/25. She said after the appt they will fax over his cardiac clearance.

## 2025-03-10 ENCOUNTER — TELEPHONE (OUTPATIENT)
Dept: CARDIOLOGY CLINIC | Age: 89
End: 2025-03-10

## 2025-03-10 NOTE — TELEPHONE ENCOUNTER
Please help me generate a letter.  Patient at intermediate risk for MACE during a low risk procedure.  No further cardiac testing indicated.  Please make sure patient aware.

## 2025-03-10 NOTE — TELEPHONE ENCOUNTER
Mani Chirinos, 1935    Cardiac Risk Assessment    What type of procedure are you having?  Cystoscopy bilateral retrograde pyelogram     When is your procedure scheduled for?  03/28/2025    Medications to be stopped.  NONE    What physician is performing your procedure?  Dr. Jag Bourgeois    Phone Number:   [phone]    Fax number to send the letter:   [phone]    Cardiologist:   PAGE    Last Appointment:   01/28/2025    Next Appointment:   NONE    Are you on any blood thinners?   eliquis    Anaesthesia being used?  MAC

## 2025-03-20 ENCOUNTER — HOSPITAL ENCOUNTER (INPATIENT)
Age: 89
LOS: 2 days | Discharge: HOME | DRG: 669 | End: 2025-03-22
Attending: STUDENT IN AN ORGANIZED HEALTH CARE EDUCATION/TRAINING PROGRAM | Admitting: INTERNAL MEDICINE
Payer: MEDICARE

## 2025-03-20 DIAGNOSIS — R42 LIGHTHEADED: ICD-10-CM

## 2025-03-20 DIAGNOSIS — R79.89 ELEVATED BRAIN NATRIURETIC PEPTIDE (BNP) LEVEL: ICD-10-CM

## 2025-03-20 DIAGNOSIS — D49.4 BLADDER TUMOR: ICD-10-CM

## 2025-03-20 DIAGNOSIS — D64.9 ANEMIA, UNSPECIFIED TYPE: Primary | ICD-10-CM

## 2025-03-20 DIAGNOSIS — R31.0 GROSS HEMATURIA: ICD-10-CM

## 2025-03-20 LAB
ABO/RH: NORMAL
ALBUMIN SERPL-MCNC: 3.6 G/DL (ref 3.4–5)
ALBUMIN/GLOB SERPL: 1.7 {RATIO} (ref 1.1–2.2)
ALP SERPL-CCNC: 60 U/L (ref 40–129)
ALT SERPL-CCNC: 14 U/L (ref 10–40)
ANION GAP SERPL CALCULATED.3IONS-SCNC: 11 MMOL/L (ref 3–16)
ANTIBODY SCREEN: NORMAL
APTT BLD: 22.9 SEC (ref 22.1–36.4)
AST SERPL-CCNC: 17 U/L (ref 15–37)
BASOPHILS # BLD: 0.1 K/UL (ref 0–0.2)
BASOPHILS NFR BLD: 1.4 %
BILIRUB SERPL-MCNC: 0.3 MG/DL (ref 0–1)
BLOOD BANK DISPENSE STATUS: NORMAL
BLOOD BANK PRODUCT CODE: NORMAL
BPU ID: NORMAL
BUN SERPL-MCNC: 18 MG/DL (ref 7–20)
CALCIUM SERPL-MCNC: 8.8 MG/DL (ref 8.3–10.6)
CHLORIDE SERPL-SCNC: 106 MMOL/L (ref 99–110)
CO2 SERPL-SCNC: 21 MMOL/L (ref 21–32)
CREAT SERPL-MCNC: 2 MG/DL (ref 0.8–1.3)
D-DIMER QUANTITATIVE: 0.39 UG/ML FEU (ref 0–0.6)
DEPRECATED RDW RBC AUTO: 20.2 % (ref 12.4–15.4)
DESCRIPTION BLOOD BANK: NORMAL
EOSINOPHIL # BLD: 0.1 K/UL (ref 0–0.6)
EOSINOPHIL NFR BLD: 3.7 %
GFR SERPLBLD CREATININE-BSD FMLA CKD-EPI: 31 ML/MIN/{1.73_M2}
GLUCOSE SERPL-MCNC: 110 MG/DL (ref 70–99)
HCT VFR BLD AUTO: 21.4 % (ref 40.5–52.5)
HCT VFR BLD AUTO: 22.3 % (ref 40.5–52.5)
HGB BLD-MCNC: 6.7 G/DL (ref 13.5–17.5)
HGB BLD-MCNC: 7.1 G/DL (ref 13.5–17.5)
INR PPP: 1.04 (ref 0.85–1.15)
LYMPHOCYTES # BLD: 0.7 K/UL (ref 1–5.1)
LYMPHOCYTES NFR BLD: 19 %
MCH RBC QN AUTO: 27.9 PG (ref 26–34)
MCHC RBC AUTO-ENTMCNC: 31.7 G/DL (ref 31–36)
MCV RBC AUTO: 88 FL (ref 80–100)
MONOCYTES # BLD: 0.5 K/UL (ref 0–1.3)
MONOCYTES NFR BLD: 12.8 %
NEUTROPHILS # BLD: 2.4 K/UL (ref 1.7–7.7)
NEUTROPHILS NFR BLD: 63.1 %
NT-PROBNP SERPL-MCNC: 1078 PG/ML (ref 0–449)
PLATELET # BLD AUTO: 247 K/UL (ref 135–450)
PMV BLD AUTO: 7 FL (ref 5–10.5)
POTASSIUM SERPL-SCNC: 4.5 MMOL/L (ref 3.5–5.1)
PROT SERPL-MCNC: 5.7 G/DL (ref 6.4–8.2)
PROTHROMBIN TIME: 13.8 SEC (ref 11.9–14.9)
RBC # BLD AUTO: 2.53 M/UL (ref 4.2–5.9)
SODIUM SERPL-SCNC: 138 MMOL/L (ref 136–145)
TROPONIN, HIGH SENSITIVITY: 28 NG/L (ref 0–22)
WBC # BLD AUTO: 3.8 K/UL (ref 4–11)

## 2025-03-20 PROCEDURE — P9016 RBC LEUKOCYTES REDUCED: HCPCS

## 2025-03-20 PROCEDURE — 86901 BLOOD TYPING SEROLOGIC RH(D): CPT

## 2025-03-20 PROCEDURE — 6360000002 HC RX W HCPCS: Performed by: PHYSICIAN ASSISTANT

## 2025-03-20 PROCEDURE — 93005 ELECTROCARDIOGRAM TRACING: CPT | Performed by: STUDENT IN AN ORGANIZED HEALTH CARE EDUCATION/TRAINING PROGRAM

## 2025-03-20 PROCEDURE — 36415 COLL VENOUS BLD VENIPUNCTURE: CPT

## 2025-03-20 PROCEDURE — 85730 THROMBOPLASTIN TIME PARTIAL: CPT

## 2025-03-20 PROCEDURE — 83880 ASSAY OF NATRIURETIC PEPTIDE: CPT

## 2025-03-20 PROCEDURE — 96374 THER/PROPH/DIAG INJ IV PUSH: CPT

## 2025-03-20 PROCEDURE — 85014 HEMATOCRIT: CPT

## 2025-03-20 PROCEDURE — 86850 RBC ANTIBODY SCREEN: CPT

## 2025-03-20 PROCEDURE — 99285 EMERGENCY DEPT VISIT HI MDM: CPT

## 2025-03-20 PROCEDURE — 2500000003 HC RX 250 WO HCPCS: Performed by: PHYSICIAN ASSISTANT

## 2025-03-20 PROCEDURE — 86923 COMPATIBILITY TEST ELECTRIC: CPT

## 2025-03-20 PROCEDURE — 85610 PROTHROMBIN TIME: CPT

## 2025-03-20 PROCEDURE — 36430 TRANSFUSION BLD/BLD COMPNT: CPT

## 2025-03-20 PROCEDURE — 80053 COMPREHEN METABOLIC PANEL: CPT

## 2025-03-20 PROCEDURE — 85025 COMPLETE CBC W/AUTO DIFF WBC: CPT

## 2025-03-20 PROCEDURE — 86900 BLOOD TYPING SEROLOGIC ABO: CPT

## 2025-03-20 PROCEDURE — 84484 ASSAY OF TROPONIN QUANT: CPT

## 2025-03-20 PROCEDURE — 85379 FIBRIN DEGRADATION QUANT: CPT

## 2025-03-20 PROCEDURE — 85018 HEMOGLOBIN: CPT

## 2025-03-20 PROCEDURE — 30233N1 TRANSFUSION OF NONAUTOLOGOUS RED BLOOD CELLS INTO PERIPHERAL VEIN, PERCUTANEOUS APPROACH: ICD-10-PCS | Performed by: STUDENT IN AN ORGANIZED HEALTH CARE EDUCATION/TRAINING PROGRAM

## 2025-03-20 PROCEDURE — 2500000003 HC RX 250 WO HCPCS: Performed by: INTERNAL MEDICINE

## 2025-03-20 PROCEDURE — 1200000000 HC SEMI PRIVATE

## 2025-03-20 RX ORDER — ONDANSETRON 2 MG/ML
4 INJECTION INTRAMUSCULAR; INTRAVENOUS EVERY 6 HOURS PRN
Status: DISCONTINUED | OUTPATIENT
Start: 2025-03-20 | End: 2025-03-22 | Stop reason: HOSPADM

## 2025-03-20 RX ORDER — AMIODARONE HYDROCHLORIDE 200 MG/1
200 TABLET ORAL DAILY
Status: DISCONTINUED | OUTPATIENT
Start: 2025-03-21 | End: 2025-03-22 | Stop reason: HOSPADM

## 2025-03-20 RX ORDER — SODIUM CHLORIDE 0.9 % (FLUSH) 0.9 %
5-40 SYRINGE (ML) INJECTION PRN
Status: DISCONTINUED | OUTPATIENT
Start: 2025-03-20 | End: 2025-03-22 | Stop reason: HOSPADM

## 2025-03-20 RX ORDER — ACETAMINOPHEN 650 MG/1
650 SUPPOSITORY RECTAL EVERY 6 HOURS PRN
Status: DISCONTINUED | OUTPATIENT
Start: 2025-03-20 | End: 2025-03-22 | Stop reason: HOSPADM

## 2025-03-20 RX ORDER — SODIUM CHLORIDE 0.9 % (FLUSH) 0.9 %
5-40 SYRINGE (ML) INJECTION EVERY 12 HOURS SCHEDULED
Status: DISCONTINUED | OUTPATIENT
Start: 2025-03-20 | End: 2025-03-22 | Stop reason: HOSPADM

## 2025-03-20 RX ORDER — ATORVASTATIN CALCIUM 10 MG/1
10 TABLET, FILM COATED ORAL DAILY
Status: DISCONTINUED | OUTPATIENT
Start: 2025-03-21 | End: 2025-03-22 | Stop reason: HOSPADM

## 2025-03-20 RX ORDER — SODIUM CHLORIDE 9 MG/ML
INJECTION, SOLUTION INTRAVENOUS PRN
Status: DISCONTINUED | OUTPATIENT
Start: 2025-03-20 | End: 2025-03-22 | Stop reason: HOSPADM

## 2025-03-20 RX ORDER — ACETAMINOPHEN 325 MG/1
650 TABLET ORAL EVERY 6 HOURS PRN
Status: DISCONTINUED | OUTPATIENT
Start: 2025-03-20 | End: 2025-03-22 | Stop reason: HOSPADM

## 2025-03-20 RX ORDER — PANTOPRAZOLE SODIUM 40 MG/1
40 TABLET, DELAYED RELEASE ORAL
Status: DISCONTINUED | OUTPATIENT
Start: 2025-03-21 | End: 2025-03-22 | Stop reason: HOSPADM

## 2025-03-20 RX ORDER — ONDANSETRON 4 MG/1
4 TABLET, ORALLY DISINTEGRATING ORAL EVERY 8 HOURS PRN
Status: DISCONTINUED | OUTPATIENT
Start: 2025-03-20 | End: 2025-03-22 | Stop reason: HOSPADM

## 2025-03-20 RX ADMIN — SODIUM CHLORIDE, PRESERVATIVE FREE 10 ML: 5 INJECTION INTRAVENOUS at 20:36

## 2025-03-20 RX ADMIN — CEFTRIAXONE SODIUM 1000 MG: 1 INJECTION, POWDER, FOR SOLUTION INTRAMUSCULAR; INTRAVENOUS at 12:25

## 2025-03-20 ASSESSMENT — PAIN - FUNCTIONAL ASSESSMENT: PAIN_FUNCTIONAL_ASSESSMENT: NONE - DENIES PAIN

## 2025-03-20 ASSESSMENT — LIFESTYLE VARIABLES
HOW OFTEN DO YOU HAVE A DRINK CONTAINING ALCOHOL: NEVER
HOW MANY STANDARD DRINKS CONTAINING ALCOHOL DO YOU HAVE ON A TYPICAL DAY: PATIENT DOES NOT DRINK

## 2025-03-20 NOTE — ED NOTES
Mani Chirinos is a 89 y.o. male admitted for  Principal Problem:    Gross hematuria  Resolved Problems:    * No resolved hospital problems. *  .   Patient Home via EMS transportation with   Chief Complaint   Patient presents with    Hematuria     Patient reports calling EMS for of hgb 7.2 (pre op for a \"growth\" removal on bladder on Friday) and lightheadedness starting this morning, informed by PCP to come to ER d/t dizziness and low hgb. Pt reports hematuria with blood clots for 3-4 weeks, pt is blind and wife reports witnessing this. Pt hx of CKD and 1 nephrectomy.   .  Patient is alert and Person, Place, Time, and Situation  Patient's baseline mobility: Baseline Mobility: Cane   Code Status: Prior   Cardiac Rhythm:       Is patient on baseline Oxygen: no how many Liters:   Abnormal Assessment Findings: Patient reports symptoms of lightheadedness upon exertion and increased generalized fatigue.    Isolation: None      NIH Score:    C-SSRS: Risk of Suicide: No Risk  Bedside swallow:        Active LDA's:   Peripheral IV 03/20/25 Left Antecubital (Active)           Family/Caregiver Present yes Any Concerns: no   Restraints no  Sitter no         Vitals: MEWS Score: 1    Vitals:    03/20/25 1043 03/20/25 1143 03/20/25 1213 03/20/25 1243   BP: (!) 156/54 (!) 122/51 (!) 126/58 (!) 125/52   Pulse: 77 66 63 67   Resp: 18 22 21 13   Temp: 98 °F (36.7 °C)      TempSrc: Oral      SpO2: 100% 95% 96% 99%   Weight: 80.6 kg (177 lb 12.8 oz)          Last documented pain score (0-10 scale)    Pain medication administered No.    Pertinent or High Risk Medications/Drips: No.    Pending Blood Product Administration: no    Abnormal labs:   Abnormal Labs Reviewed   CBC WITH AUTO DIFFERENTIAL - Abnormal; Notable for the following components:       Result Value    WBC 3.8 (*)     RBC 2.53 (*)     Hemoglobin 7.1 (*)     Hematocrit 22.3 (*)     RDW 20.2 (*)     Lymphocytes Absolute 0.7 (*)     All other components within normal

## 2025-03-20 NOTE — PROGRESS NOTES
4 Eyes Skin Assessment and Patient belongings     The patient is being assess for  Admission    I agree that 2 Nurses have performed a thorough Head to Toe Skin Assessment on the patient. ALL assessment sites listed below have been assessed.       Areas assessed by both nurses: Myra RICH & Aline RN   [x]   Head, Face, and Ears   [x]   Shoulders, Back, and Chest  [x]   Arms, Elbows, and Hands   [x]   Coccyx, Sacrum, and IschIum  [x]   Legs, Feet, and Heels        Does the Patient have Skin Breakdown?  No         Esvin Prevention initiated:  No   Wound Care Orders initiated:  No      [facility] nurse consulted for Pressure Injury (Stage 3,4, Unstageable, DTI, NWPT, and Complex wounds), New and Established Ostomies:  No      I agree that 2 Nurses have reviewed patient belongings with the patient/family and documented in the flowsheet upon admission or transfer to the unit.             Nurse 1 eSignature: Electronically signed by Myra Barrett RN on 3/20/25 at 2:31 PM EDT    **SHARE this note so that the co-signing nurse is able to place an eSignature**    Nurse 2 eSignature: {Esignature:[id number]}

## 2025-03-20 NOTE — ED PROVIDER NOTES
[facility] EMERGENCY DEPARTMENT  EMERGENCY DEPARTMENT ENCOUNTER        Patient Name: Mani Chirinos  MRN: [id number]  Birthdate 1935  Date of evaluation: 3/20/2025  Provider: Morelia Mccormick MD  PCP: Zander Ko III, MD  Note Started: 11:58 AM EDT 3/20/25    I independently examined and evaluated Mani Chirinos. I personally saw the patient and performed a substantive portion of the visit including all aspects of the medical decision making.  I made/approved the management plan and take responsibility for the patient management.  I am the primary physician of record.    CHIEF COMPLAINT  Hematuria, lightheadedness       HISTORY OF PRESENT ILLNESS  History from : Patient    Limitations to history : None    In brief, Mani Chirinos is a 89 y.o. male  has a past medical history of Acid reflux, Anemia, Asthenia pigmentosa (HCC), Atrial fibrillation (HCC), Blindness, BPH (benign prostatic hyperplasia), Cancer (HCC), CHF (congestive heart failure) (HCC), CKD (chronic kidney disease), stage III (HCC), History of blood transfusion, Hyperlipidemia, Hypertension, Iron deficiency anemia, Irregular heart beat, Kidney stones, PAF (paroxysmal atrial fibrillation) (HCC), PONV (postoperative nausea and vomiting), Retinitis, Retinitis pigmentosa of both eyes, and Wears partial dentures., who presents to the ED complaining of hematuria.  Patient is status post a right nephrectomy status post malignancy and was recently diagnosed with a new bladder mass.  He presents with hematuria.  Was seen by urology yesterday with hemoglobin 7.2 as well as evidence of a UTI.  Patient does report lightheadedness and some shortness of breath with exertion.  Denies chest pain.    REVIEW OF SYSTEMS  All systems reviewed, pertinent positives per HPI otherwise noted to be negative.    Focused exam revealed   PHYSICAL EXAM  ED Triage Vitals [03/20/25 1043]   BP Systolic BP Percentile Diastolic BP Percentile Temp

## 2025-03-20 NOTE — PROGRESS NOTES
Admission assessment completed and documented. VSS. A/ox4. Denies pain. Pt with bright red bleeding with clots in urine. Pt blind ambulates stand by.  Bed locked and in lowest position. Bedside table and call light within reach. Denies further needs at this time.

## 2025-03-20 NOTE — ED PROVIDER NOTES
[facility] EMERGENCY DEPARTMENT  EMERGENCY DEPARTMENT ENCOUNTER        Pt Name: Mani Chirinos  MRN: [id number]  Birthdate 1935  Date of evaluation: 3/20/2025  Provider: GERONIMO COSTELLO PA-C  PCP: Zander Ko III, MD  ED Attending: Morelia Mccormick MD       I have seen and evaluated this patient with my supervising physician Morelia Mccormick MD.    CHIEF COMPLAINT:     Chief Complaint   Patient presents with    Hematuria     Patient reports calling EMS for of hgb 7.2 (pre op for a \"growth\" removal on bladder on Friday) and lightheadedness starting this morning, informed by PCP to come to ER d/t dizziness and low hgb. Pt reports hematuria with blood clots for 3-4 weeks, pt is blind and wife reports witnessing this. Pt hx of CKD and 1 nephrectomy.       HISTORY OF PRESENT ILLNESS:      History provided by the patient. No limitations.    Mani Chirinos is a 89 y.o. male who arrives to the ED by EMS from home.  Patient lives with his wife.  The patient has struggled for over a year with hematuria in conjunction with right renal cancer and now new found mass in bladder.  The patient is blind and if it were not for the fact that he occasionally urinates and forgets to flush, the fact that he has gross hematuria may not have been noticed.  Fortunately, his wife has noticed ongoing gross hematuria to the point where the patient is scheduled for cystoscopy next week.  He had screening labs and urine done per urology yesterday and received a call that his hemoglobin was 7.2.  The patient was told that if he was feeling weak, lightheaded, dizzy, faint, he should come to the ED.  He began feeling that way this morning as well as feeling extremely exhausted with even mild activity.  In conjunction with the newfound anemia that is significant, the patient has continued with gross hematuria.  He denies being in any pain at this time.  Denies headaches, chest pain, abdominal pain.    Nursing Notes  by mouth daily    OMEPRAZOLE (PRILOSEC) 20 MG DELAYED RELEASE CAPSULE    Take 1 capsule by mouth daily as needed    SIMVASTATIN (ZOCOR) 20 MG TABLET    Take 1 tablet by mouth nightly       ALLERGIES:    Lisinopril, Doxycycline, Ibuprofen, and Pcn [penicillins]    FAMILY HISTORY:     @FAMHX    SOCIAL HISTORY:       Social History     Socioeconomic History    Marital status:    Occupational History    Occupation:       Employer: SELF EMPLOYED     Comment: Quit in 1988 r/t blindness    Tobacco Use    Smoking status: Never    Smokeless tobacco: Never   Vaping Use    Vaping status: Never Used   Substance and Sexual Activity    Alcohol use: No    Drug use: No    Sexual activity: Yes     Partners: Female     Social Drivers of Health     Food Insecurity: No Food Insecurity (9/6/2024)    Hunger Vital Sign     Worried About Running Out of Food in the Last Year: Never true     Ran Out of Food in the Last Year: Never true   Transportation Needs: No Transportation Needs (9/6/2024)    PRAPARE - Transportation     Lack of Transportation (Medical): No     Lack of Transportation (Non-Medical): No   Housing Stability: Low Risk  (9/6/2024)    Housing Stability Vital Sign     Unable to Pay for Housing in the Last Year: No     Number of Times Moved in the Last Year: 1     Homeless in the Last Year: No       SCREENINGS:    Hamilton Coma Scale  Eye Opening: Spontaneous  Best Verbal Response: Oriented  Best Motor Response: Obeys commands  Hamilton Coma Scale Score: 15      CIWA Assessment  BP: (!) 126/58  Pulse: 63        PHYSICAL EXAM:       ED Triage Vitals [03/20/25 1043]   BP Systolic BP Percentile Diastolic BP Percentile Temp Temp Source Pulse Respirations SpO2   (!) 156/54 -- -- 98 °F (36.7 °C) Oral 77 18 100 %      Height Weight - Scale         -- 80.6 kg (177 lb 12.8 oz)             Physical Exam    CONSTITUTIONAL: Awake and alert. Cooperative. Well-developed. Well-nourished. Non-toxic. No acute distress.  HENT:

## 2025-03-20 NOTE — H&P
[facility] Medicine History & Physical    V 1.6    Date of Admission: 3/20/2025    Date of Service:  Pt seen/examined on 3/20/2025     [x]Admitted to Inpatient with expected LOS greater than two midnights due to medical therapy.  []Placed in Observation status.    Chief Admission Complaint:  Lightheadedness    Presenting Admission History:      89 y.o. male who presented to [facility] with lightheadedness.  PMHx significant for HTN, BPH, CHF, CKD, PAF and blindness. He has been dealing with gross hematuria for there past month. He is followed by Urology and there is concern for bladder mass. He was scheduled for Cystoscopy. Pre-op evaluation noted anemia, Hb 7.2. He has developed worsening lightheadedness and presented to the ED for evaluation.       Assessment/Plan:      Acute Blood Loss Anemia 2/2 Gross Hematuria: Follow serial Hb and transfuse for Hb <7.0. Eliquis was previously stopped on 2/10/25.     Gross Hematuria, possible Bladder Mass, BPH: Hx of right Nephrectomy for malignancy, hx of TURP 9/13/24. Urology consulted and planning for inpatient Cystoscopy on 3/20. Monitor.     Hypertension, benign: Controlled. Holding home meds due to above. Monitor and adjust as needed.     Paroxysmal Atrial Fibrillation: Currently off Eliquis. Continue Amiodarone but holding Toprol due to above. Watchman has been considered.     Cardiomyopathy, LVEF 30-35%: Etiology unclear. Followed by Dr. Torres. LHC was being considered recently but on hold due to gross hematuria and anemia.     CKD Stage IV: Baseline Cr 1.5-1.9.  Stable. Followed by Dr. Robles. Monitor.     Discussed management and the need for Hospitalization of the patient w/ the Emergency Department Provider.      EKG:  I have reviewed the EKG with the following interpretation: SR, PVCs, no acute ischemic changes.     Physical Exam Performed:      BP (!) 125/52   Pulse 67   Temp 98 °F (36.7 °C) (Oral)   Resp 13   Wt 80.6 kg (177 lb 12.8 oz)           Recent Labs     03/20/25  1050      K 4.5      CO2 21   BUN 18   CREATININE 2.0*   CALCIUM 8.8     Recent Labs     03/20/25  1050   PROBNP 1,078*   TROPHS 28*     No results for input(s): \"LABA1C\" in the last 72 hours.  Recent Labs     03/20/25  1050   AST 17   ALT 14   BILITOT 0.3   ALKPHOS 60     Recent Labs     03/20/25  1050   INR 1.04        Pradeep Vivar MD

## 2025-03-21 ENCOUNTER — ANESTHESIA (OUTPATIENT)
Dept: OPERATING ROOM | Age: 89
End: 2025-03-21
Payer: MEDICARE

## 2025-03-21 ENCOUNTER — APPOINTMENT (OUTPATIENT)
Dept: GENERAL RADIOLOGY | Age: 89
DRG: 669 | End: 2025-03-21
Payer: MEDICARE

## 2025-03-21 ENCOUNTER — ANESTHESIA EVENT (OUTPATIENT)
Dept: OPERATING ROOM | Age: 89
End: 2025-03-21
Payer: MEDICARE

## 2025-03-21 LAB
ANION GAP SERPL CALCULATED.3IONS-SCNC: 8 MMOL/L (ref 3–16)
BUN SERPL-MCNC: 18 MG/DL (ref 7–20)
CALCIUM SERPL-MCNC: 8.9 MG/DL (ref 8.3–10.6)
CHLORIDE SERPL-SCNC: 108 MMOL/L (ref 99–110)
CO2 SERPL-SCNC: 22 MMOL/L (ref 21–32)
CREAT SERPL-MCNC: 1.9 MG/DL (ref 0.8–1.3)
DEPRECATED RDW RBC AUTO: 18.6 % (ref 12.4–15.4)
EKG ATRIAL RATE: 64 BPM
EKG DIAGNOSIS: NORMAL
EKG P AXIS: 103 DEGREES
EKG P-R INTERVAL: 164 MS
EKG Q-T INTERVAL: 444 MS
EKG QRS DURATION: 94 MS
EKG QTC CALCULATION (BAZETT): 458 MS
EKG R AXIS: -7 DEGREES
EKG T AXIS: 2 DEGREES
EKG VENTRICULAR RATE: 64 BPM
GFR SERPLBLD CREATININE-BSD FMLA CKD-EPI: 33 ML/MIN/{1.73_M2}
GLUCOSE SERPL-MCNC: 92 MG/DL (ref 70–99)
HCT VFR BLD AUTO: 24.1 % (ref 40.5–52.5)
HCT VFR BLD AUTO: 25.7 % (ref 40.5–52.5)
HCT VFR BLD AUTO: 27.6 % (ref 40.5–52.5)
HCT VFR BLD AUTO: 28.7 % (ref 40.5–52.5)
HGB BLD-MCNC: 7.6 G/DL (ref 13.5–17.5)
HGB BLD-MCNC: 8.2 G/DL (ref 13.5–17.5)
HGB BLD-MCNC: 8.5 G/DL (ref 13.5–17.5)
HGB BLD-MCNC: 9 G/DL (ref 13.5–17.5)
IRON SATN MFR SERPL: 5 % (ref 20–50)
IRON SERPL-MCNC: 16 UG/DL (ref 59–158)
MCH RBC QN AUTO: 28 PG (ref 26–34)
MCHC RBC AUTO-ENTMCNC: 31.8 G/DL (ref 31–36)
MCV RBC AUTO: 88.1 FL (ref 80–100)
PLATELET # BLD AUTO: 230 K/UL (ref 135–450)
PMV BLD AUTO: 7.1 FL (ref 5–10.5)
POTASSIUM SERPL-SCNC: 4.5 MMOL/L (ref 3.5–5.1)
RBC # BLD AUTO: 2.91 M/UL (ref 4.2–5.9)
SODIUM SERPL-SCNC: 138 MMOL/L (ref 136–145)
TIBC SERPL-MCNC: 297 UG/DL (ref 260–445)
WBC # BLD AUTO: 4.4 K/UL (ref 4–11)

## 2025-03-21 PROCEDURE — 2500000003 HC RX 250 WO HCPCS: Performed by: INTERNAL MEDICINE

## 2025-03-21 PROCEDURE — 80048 BASIC METABOLIC PNL TOTAL CA: CPT

## 2025-03-21 PROCEDURE — 7100000001 HC PACU RECOVERY - ADDTL 15 MIN: Performed by: UROLOGY

## 2025-03-21 PROCEDURE — 85018 HEMOGLOBIN: CPT

## 2025-03-21 PROCEDURE — 0TCB8ZZ EXTIRPATION OF MATTER FROM BLADDER, VIA NATURAL OR ARTIFICIAL OPENING ENDOSCOPIC: ICD-10-PCS | Performed by: UROLOGY

## 2025-03-21 PROCEDURE — 83540 ASSAY OF IRON: CPT

## 2025-03-21 PROCEDURE — 85027 COMPLETE CBC AUTOMATED: CPT

## 2025-03-21 PROCEDURE — 3600000014 HC SURGERY LEVEL 4 ADDTL 15MIN: Performed by: UROLOGY

## 2025-03-21 PROCEDURE — 83550 IRON BINDING TEST: CPT

## 2025-03-21 PROCEDURE — 2500000003 HC RX 250 WO HCPCS: Performed by: NURSE ANESTHETIST, CERTIFIED REGISTERED

## 2025-03-21 PROCEDURE — 93010 ELECTROCARDIOGRAM REPORT: CPT | Performed by: INTERNAL MEDICINE

## 2025-03-21 PROCEDURE — 3700000000 HC ANESTHESIA ATTENDED CARE: Performed by: UROLOGY

## 2025-03-21 PROCEDURE — 2500000003 HC RX 250 WO HCPCS: Performed by: UROLOGY

## 2025-03-21 PROCEDURE — 2709999900 HC NON-CHARGEABLE SUPPLY: Performed by: UROLOGY

## 2025-03-21 PROCEDURE — 6360000002 HC RX W HCPCS: Performed by: STUDENT IN AN ORGANIZED HEALTH CARE EDUCATION/TRAINING PROGRAM

## 2025-03-21 PROCEDURE — 3700000001 HC ADD 15 MINUTES (ANESTHESIA): Performed by: UROLOGY

## 2025-03-21 PROCEDURE — 7100000000 HC PACU RECOVERY - FIRST 15 MIN: Performed by: UROLOGY

## 2025-03-21 PROCEDURE — 2720000010 HC SURG SUPPLY STERILE: Performed by: UROLOGY

## 2025-03-21 PROCEDURE — 88307 TISSUE EXAM BY PATHOLOGIST: CPT

## 2025-03-21 PROCEDURE — 0TBB8ZZ EXCISION OF BLADDER, VIA NATURAL OR ARTIFICIAL OPENING ENDOSCOPIC: ICD-10-PCS | Performed by: UROLOGY

## 2025-03-21 PROCEDURE — 6360000002 HC RX W HCPCS: Performed by: NURSE ANESTHETIST, CERTIFIED REGISTERED

## 2025-03-21 PROCEDURE — 36415 COLL VENOUS BLD VENIPUNCTURE: CPT

## 2025-03-21 PROCEDURE — 3600000004 HC SURGERY LEVEL 4 BASE: Performed by: UROLOGY

## 2025-03-21 PROCEDURE — 1200000000 HC SEMI PRIVATE

## 2025-03-21 PROCEDURE — 2580000003 HC RX 258: Performed by: NURSE ANESTHETIST, CERTIFIED REGISTERED

## 2025-03-21 PROCEDURE — 85014 HEMATOCRIT: CPT

## 2025-03-21 RX ORDER — LIDOCAINE HYDROCHLORIDE 10 MG/ML
1 INJECTION, SOLUTION INFILTRATION; PERINEURAL
Status: DISCONTINUED | OUTPATIENT
Start: 2025-03-21 | End: 2025-03-21 | Stop reason: HOSPADM

## 2025-03-21 RX ORDER — SODIUM CHLORIDE 0.9 % (FLUSH) 0.9 %
5-40 SYRINGE (ML) INJECTION PRN
Status: DISCONTINUED | OUTPATIENT
Start: 2025-03-21 | End: 2025-03-21 | Stop reason: HOSPADM

## 2025-03-21 RX ORDER — SODIUM CHLORIDE 9 MG/ML
INJECTION, SOLUTION INTRAVENOUS PRN
Status: DISCONTINUED | OUTPATIENT
Start: 2025-03-21 | End: 2025-03-21 | Stop reason: HOSPADM

## 2025-03-21 RX ORDER — PROPOFOL 10 MG/ML
INJECTION, EMULSION INTRAVENOUS
Status: DISCONTINUED | OUTPATIENT
Start: 2025-03-21 | End: 2025-03-21 | Stop reason: SDUPTHER

## 2025-03-21 RX ORDER — EPHEDRINE SULFATE 50 MG/ML
INJECTION INTRAVENOUS
Status: DISCONTINUED | OUTPATIENT
Start: 2025-03-21 | End: 2025-03-21 | Stop reason: SDUPTHER

## 2025-03-21 RX ORDER — SODIUM CHLORIDE 9 MG/ML
INJECTION, SOLUTION INTRAVENOUS
Status: DISCONTINUED | OUTPATIENT
Start: 2025-03-21 | End: 2025-03-21 | Stop reason: SDUPTHER

## 2025-03-21 RX ORDER — LABETALOL HYDROCHLORIDE 5 MG/ML
10 INJECTION, SOLUTION INTRAVENOUS
Status: DISCONTINUED | OUTPATIENT
Start: 2025-03-21 | End: 2025-03-21 | Stop reason: HOSPADM

## 2025-03-21 RX ORDER — APREPITANT 40 MG/1
40 CAPSULE ORAL ONCE
Status: COMPLETED | OUTPATIENT
Start: 2025-03-21 | End: 2025-03-21

## 2025-03-21 RX ORDER — SODIUM CHLORIDE, SODIUM LACTATE, POTASSIUM CHLORIDE, CALCIUM CHLORIDE 600; 310; 30; 20 MG/100ML; MG/100ML; MG/100ML; MG/100ML
INJECTION, SOLUTION INTRAVENOUS CONTINUOUS
Status: DISCONTINUED | OUTPATIENT
Start: 2025-03-21 | End: 2025-03-21 | Stop reason: HOSPADM

## 2025-03-21 RX ORDER — ONDANSETRON 2 MG/ML
INJECTION INTRAMUSCULAR; INTRAVENOUS
Status: DISCONTINUED | OUTPATIENT
Start: 2025-03-21 | End: 2025-03-21 | Stop reason: SDUPTHER

## 2025-03-21 RX ORDER — SODIUM CHLORIDE 0.9 % (FLUSH) 0.9 %
5-40 SYRINGE (ML) INJECTION EVERY 12 HOURS SCHEDULED
Status: DISCONTINUED | OUTPATIENT
Start: 2025-03-21 | End: 2025-03-21 | Stop reason: HOSPADM

## 2025-03-21 RX ORDER — OXYCODONE HYDROCHLORIDE 5 MG/1
5 TABLET ORAL PRN
Status: DISCONTINUED | OUTPATIENT
Start: 2025-03-21 | End: 2025-03-21 | Stop reason: HOSPADM

## 2025-03-21 RX ORDER — LIDOCAINE HYDROCHLORIDE 20 MG/ML
INJECTION, SOLUTION INFILTRATION; PERINEURAL
Status: DISCONTINUED | OUTPATIENT
Start: 2025-03-21 | End: 2025-03-21 | Stop reason: SDUPTHER

## 2025-03-21 RX ORDER — PHENYLEPHRINE HCL IN 0.9% NACL 1 MG/10 ML
SYRINGE (ML) INTRAVENOUS
Status: DISCONTINUED | OUTPATIENT
Start: 2025-03-21 | End: 2025-03-21 | Stop reason: SDUPTHER

## 2025-03-21 RX ORDER — DROPERIDOL 2.5 MG/ML
0.62 INJECTION, SOLUTION INTRAMUSCULAR; INTRAVENOUS
Status: DISCONTINUED | OUTPATIENT
Start: 2025-03-21 | End: 2025-03-21 | Stop reason: HOSPADM

## 2025-03-21 RX ORDER — APREPITANT 40 MG/1
40 CAPSULE ORAL ONCE
Status: DISCONTINUED | OUTPATIENT
Start: 2025-03-21 | End: 2025-03-21 | Stop reason: HOSPADM

## 2025-03-21 RX ORDER — DEXAMETHASONE SODIUM PHOSPHATE 4 MG/ML
INJECTION, SOLUTION INTRA-ARTICULAR; INTRALESIONAL; INTRAMUSCULAR; INTRAVENOUS; SOFT TISSUE
Status: DISCONTINUED | OUTPATIENT
Start: 2025-03-21 | End: 2025-03-21 | Stop reason: SDUPTHER

## 2025-03-21 RX ORDER — NALOXONE HYDROCHLORIDE 0.4 MG/ML
INJECTION, SOLUTION INTRAMUSCULAR; INTRAVENOUS; SUBCUTANEOUS PRN
Status: DISCONTINUED | OUTPATIENT
Start: 2025-03-21 | End: 2025-03-21 | Stop reason: HOSPADM

## 2025-03-21 RX ORDER — MIDAZOLAM HYDROCHLORIDE 5 MG/ML
2 INJECTION, SOLUTION INTRAMUSCULAR; INTRAVENOUS
Status: DISCONTINUED | OUTPATIENT
Start: 2025-03-21 | End: 2025-03-21 | Stop reason: HOSPADM

## 2025-03-21 RX ORDER — DIPHENHYDRAMINE HYDROCHLORIDE 50 MG/ML
12.5 INJECTION, SOLUTION INTRAMUSCULAR; INTRAVENOUS
Status: DISCONTINUED | OUTPATIENT
Start: 2025-03-21 | End: 2025-03-21 | Stop reason: HOSPADM

## 2025-03-21 RX ORDER — OXYCODONE HYDROCHLORIDE 5 MG/1
10 TABLET ORAL PRN
Status: DISCONTINUED | OUTPATIENT
Start: 2025-03-21 | End: 2025-03-21 | Stop reason: HOSPADM

## 2025-03-21 RX ORDER — MAGNESIUM HYDROXIDE 1200 MG/15ML
LIQUID ORAL CONTINUOUS PRN
Status: COMPLETED | OUTPATIENT
Start: 2025-03-21 | End: 2025-03-21

## 2025-03-21 RX ORDER — FENTANYL CITRATE 50 UG/ML
INJECTION, SOLUTION INTRAMUSCULAR; INTRAVENOUS
Status: DISCONTINUED | OUTPATIENT
Start: 2025-03-21 | End: 2025-03-21 | Stop reason: SDUPTHER

## 2025-03-21 RX ADMIN — EPHEDRINE SULFATE 10 MG: 50 INJECTION INTRAVENOUS at 15:00

## 2025-03-21 RX ADMIN — EPHEDRINE SULFATE 10 MG: 50 INJECTION INTRAVENOUS at 15:05

## 2025-03-21 RX ADMIN — Medication 100 MCG: at 15:25

## 2025-03-21 RX ADMIN — FENTANYL CITRATE 50 MCG: 50 INJECTION, SOLUTION INTRAMUSCULAR; INTRAVENOUS at 15:07

## 2025-03-21 RX ADMIN — DEXAMETHASONE SODIUM PHOSPHATE 4 MG: 4 INJECTION, SOLUTION INTRAMUSCULAR; INTRAVENOUS at 15:23

## 2025-03-21 RX ADMIN — Medication 100 MCG: at 15:10

## 2025-03-21 RX ADMIN — ONDANSETRON 4 MG: 2 INJECTION INTRAMUSCULAR; INTRAVENOUS at 15:23

## 2025-03-21 RX ADMIN — Medication 100 MCG: at 15:15

## 2025-03-21 RX ADMIN — SODIUM CHLORIDE, PRESERVATIVE FREE 10 ML: 5 INJECTION INTRAVENOUS at 20:32

## 2025-03-21 RX ADMIN — LIDOCAINE HYDROCHLORIDE 80 MG: 20 INJECTION, SOLUTION INFILTRATION; PERINEURAL at 14:53

## 2025-03-21 RX ADMIN — EPHEDRINE SULFATE 10 MG: 50 INJECTION INTRAVENOUS at 15:03

## 2025-03-21 RX ADMIN — PROPOFOL 90 MG: 10 INJECTION, EMULSION INTRAVENOUS at 14:53

## 2025-03-21 RX ADMIN — Medication 10 MG: at 15:07

## 2025-03-21 RX ADMIN — SODIUM CHLORIDE, PRESERVATIVE FREE 10 ML: 5 INJECTION INTRAVENOUS at 09:29

## 2025-03-21 RX ADMIN — FENTANYL CITRATE 25 MCG: 50 INJECTION, SOLUTION INTRAMUSCULAR; INTRAVENOUS at 14:57

## 2025-03-21 RX ADMIN — SODIUM CHLORIDE: 9 INJECTION, SOLUTION INTRAVENOUS at 14:38

## 2025-03-21 RX ADMIN — APREPITANT 40 MG: 40 CAPSULE ORAL at 14:14

## 2025-03-21 RX ADMIN — Medication 10 MG: at 14:53

## 2025-03-21 ASSESSMENT — PAIN - FUNCTIONAL ASSESSMENT
PAIN_FUNCTIONAL_ASSESSMENT: NONE - DENIES PAIN
PAIN_FUNCTIONAL_ASSESSMENT: NONE - DENIES PAIN

## 2025-03-21 NOTE — PROGRESS NOTES
Patient admitted to PACU 2 in preparation for surgery, VSS. Consent confirmed. IV in left ac, NS infusing. Belongings in pt's room. NPO since 1600. Family at bedside, phone number in system for text updates, call light within reach.

## 2025-03-21 NOTE — CARE COORDINATION
Case Management Assessment  Initial Evaluation    Date/Time of Evaluation: 3/21/2025 1:58 PM  Assessment Completed by: Lynn Garcia RN    If patient is discharged prior to next notation, then this note serves as note for discharge by case management.    Patient Name: Mani Chirinos                   YOB: 1935  Diagnosis: Gross hematuria [R31.0]  Anemia, unspecified type [D64.9]                   Date / Time: 3/20/2025 10:29 AM    Patient Admission Status: Inpatient   Readmission Risk (Low < 19, Mod (19-27), High > 27): Readmission Risk Score: 17.7    Current PCP: Zander Ko III, MD  PCP verified by CM? Yes    Chart Reviewed: Yes      History Provided by: Patient  Patient Orientation: Alert and Oriented, Person, Place, Self, Situation    Patient Cognition:      Hospitalization in the last 30 days (Readmission):  No    If yes, Readmission Assessment in  Navigator will be completed.    Advance Directives:      Code Status: Full Code   Patient's Primary Decision Maker is: Legal Next of Kin    Primary Decision Maker: Coral Chirinos [facility] - Domestic Partner - [phone]    Discharge Planning:    Patient lives with: Spouse/Significant Other Type of Home: House  Primary Care Giver: Self  Patient Support Systems include: Spouse/Significant Other   Current Financial resources: Medicare  Current community resources: None  Current services prior to admission: None            Current DME:              Type of Home Care services:  None    ADLS  Prior functional level: Independent in ADLs/IADLs  Current functional level: Independent in ADLs/IADLs    PT AM-PAC:   /24  OT AM-PAC:   /24    Family can provide assistance at DC: Yes  Would you like Case Management to discuss the discharge plan with any other family members/significant others, and if so, who? No  Plans to Return to Present Housing:    Other Identified Issues/Barriers to RETURNING to current housing: none  Potential Assistance

## 2025-03-21 NOTE — ANESTHESIA PRE PROCEDURE
proceed.)  Induction: intravenous.    MIPS: Prophylactic antiemetics administered.  Anesthetic plan and risks discussed with patient.      Plan discussed with CRNA.                    Yair Mcdonnell MD   3/21/2025

## 2025-03-21 NOTE — PROGRESS NOTES
Patient arrived to PACU bay 2, phase one initiated. Placed on bedside monitor, VSS. Report obtained from OR RN and anesthesia. Patient on room air. Assessment WNL. Warm blankets applied. Side rails in place, will monitor patient closely.

## 2025-03-21 NOTE — PROGRESS NOTES
[facility] Medicine Progress Note  V 1.6      Date of Admission: 3/20/2025    Hospital Day: 2      Chief Admission Complaint:  lightheadedness    Subjective:  Transfused PRBCs overnight. Reports improving symptoms. Plan for Cystoscopy today.     Presenting Admission History:       89 y.o. male who presented to [facility] with lightheadedness.  PMHx significant for HTN, BPH, CHF, CKD, PAF and blindness. He has been dealing with gross hematuria for there past month. He is followed by Urology and there is concern for bladder mass. He was scheduled for Cystoscopy. Pre-op evaluation noted anemia, Hb 7.2. He has developed worsening lightheadedness and presented to the ED for evaluation.        Assessment/Plan:       Acute Blood Loss Anemia 2/2 Gross Hematuria: Transfused 1 unit PRBC on 3/20 PM for worsening anemia.  Follow serial Hb and transfuse for Hb <7.0. Eliquis was previously stopped on 2/10/25.      Gross Hematuria, possible Bladder Mass, BPH: Hx of right Nephrectomy for malignancy, hx of TURP 9/13/24. Urology following; Cystoscopy on 3/21. Monitor.      Hypertension, benign: Controlled. Holding home meds due to above. Monitor and adjust as needed.      Paroxysmal Atrial Fibrillation: Currently off Eliquis. Continue Amiodarone but holding Toprol due to above. Watchman has been considered.      Cardiomyopathy, LVEF 30-35%: Etiology unclear. Followed by Dr. Torres. LHC was being considered recently but on hold due to gross hematuria and anemia. Euvolemic.      CKD Stage IV: Baseline Cr 1.5-1.9.  Stable. Followed by Dr. Robles. Monitor.     Ongoing threat to life and/or bodily function without ongoing treatment due to:  Acute Blood Loss Anemia.     Consults:    Urology consulted and appreciated.  Available consultant notes from yesterday and today were reviewed on 3/21/2025, w/ plan for continued inpatient w/up and management - as above.     IP CONSULT TO UROLOGY  IP CONSULT TO HOSPITALIST

## 2025-03-21 NOTE — ANESTHESIA POSTPROCEDURE EVALUATION
Department of Anesthesiology  Postprocedure Note    Patient: Mani Chirinos  MRN: [id number]  YOB: 1935  Date of evaluation: 3/21/2025    Procedure Summary       Date: 03/21/25 Room / Location: [address]    Anesthesia Start: 1448 Anesthesia Stop: 1533    Procedure: CYSTOSCOPY, EVACUATION OF CLOTS. TRANSURETHRAL RESECTION OF BLADDER (Bladder) Diagnosis:       Bladder tumor      Gross hematuria      (Bladder tumor [D49.4])      (Gross hematuria [R31.0])    Surgeons: Segun Pierce MD Responsible Provider: Yair Mcdonnell MD    Anesthesia Type: general ASA Status: 3            Anesthesia Type: No value filed.    Héctor Phase I: Héctor Score: 10    Héctor Phase II:      Anesthesia Post Evaluation    Comments: Postoperative Anesthesia Note    Name:    Mani Chirinos  MRN:      [id number]    Patient Vitals in the past 12 hrs:  03/21/25 1540, BP:129/63, Pulse:72, SpO2:97 %  03/21/25 1539, Temp:97.5 °F (36.4 °C), Temp src:Oral  03/21/25 1535, BP:(!) 130/57, Pulse:69, Resp:21, SpO2:92 %  03/21/25 1530, BP:(!) 132/57, Temp:97.4 °F (36.3 °C), Temp src:Oral, Pulse:68, Resp:18, SpO2:94 %  03/21/25 1315, BP:(!) 139/54, Temp:97.3 °F (36.3 °C), Temp src:Oral, Pulse:62, Resp:16, SpO2:97 %  03/21/25 0927, Pulse:61  03/21/25 0751, BP:(!) 140/50, Temp:97.9 °F (36.6 °C), Temp src:Oral, Pulse:59, Resp:16, SpO2:99 %     LABS:    CBC  Lab Results       Component                Value               Date/Time                  WBC                      4.4                 03/21/2025 07:09 AM        HGB                      8.5 (L)             03/21/2025 01:02 PM        HCT                      27.6 (L)            03/21/2025 01:02 PM        PLT                      230                 03/21/2025 07:09 AM   RENAL  Lab Results       Component                Value               Date/Time                  NA                       138                 03/21/2025 07:09 AM        K

## 2025-03-21 NOTE — CONSULTS
Reason for Consult: gross hematuria    History of Present Illness: Mani Chirinos is a 89 y.o. 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 He underwent TURP 9/6/2024 for BPH and there was no recurrence noted at that time. Apparently family had noticed blood with clots in the toilet for about a month and PCP was placing him on antibiotics. He followed up with Tanika Bates NP with Dr. Bourgeois earlier this month and had CT abdomen/pelvis which revealed a 5.6x 4.9 bladder mass for which he was scheduled a TURBT next Friday with Dr. Bourgeois. He saw his PCP 2 days ago with bloodwork which revealed low HGB. Yesterday he was very symptomatic with dizziness, lightheadedness that he was advised to come to the ED. HGB noted to be 7.1. He did drop to 6.7 last night requiring 1 unit PRBC and Hgb now 8.2. He is feeling better since getting the transfusion.       ROS:  General: no fever or chills  CV: No chest pain  Pulm: No SOB  GI: No nausea, vomiting, diarrhea or constipation  Skin: No rash  Neuro: No headaches, dizziness or neurologic symptoms  : as above  Hematologic: no complaints  Endocrine: no complaints  Psych: no complaints    Past Medical History:   Past Medical History:   Diagnosis Date    Acid reflux     Anemia     Asthenia pigmentosa (HCC)     Atrial fibrillation (HCC)     Blindness     BPH (benign prostatic hyperplasia)     Cancer (HCC)     right kidney    CHF (congestive heart failure) (HCC)     CKD (chronic kidney disease), stage III (HCC)     History of blood transfusion     Hyperlipidemia     Hypertension     Iron deficiency anemia     Irregular heart beat     Kidney stones     PAF (paroxysmal atrial fibrillation) (HCC)     PONV (postoperative nausea and vomiting)     hx of    Retinitis     Retinitis pigmentosa of both eyes     Blind    Wears partial dentures     upper       Past  Number of Times Moved in the Last Year: 1     Homeless in the Last Year: No       Meds:   Current Facility-Administered Medications: amiodarone (CORDARONE) tablet 200 mg, 200 mg, Oral, Daily  pantoprazole (PROTONIX) tablet 40 mg, 40 mg, Oral, QAM AC  atorvastatin (LIPITOR) tablet 10 mg, 10 mg, Oral, Daily  sodium chloride flush 0.9 % injection 5-40 mL, 5-40 mL, IntraVENous, 2 times per day  sodium chloride flush 0.9 % injection 5-40 mL, 5-40 mL, IntraVENous, PRN  0.9 % sodium chloride infusion, , IntraVENous, PRN  ondansetron (ZOFRAN-ODT) disintegrating tablet 4 mg, 4 mg, Oral, Q8H PRN **OR** ondansetron (ZOFRAN) injection 4 mg, 4 mg, IntraVENous, Q6H PRN  acetaminophen (TYLENOL) tablet 650 mg, 650 mg, Oral, Q6H PRN **OR** acetaminophen (TYLENOL) suppository 650 mg, 650 mg, Rectal, Q6H PRN  0.9 % sodium chloride infusion, , IntraVENous, PRN    Vitals:  BP (!) 140/50   Pulse 61   Temp 97.9 °F (36.6 °C) (Oral)   Resp 16   Ht 1.778 m (5' 10\")   Wt 81.2 kg (179 lb 1.6 oz)   SpO2 99%   BMI 25.70 kg/m²     Intake/Output Summary (Last 24 hours) at 3/21/2025 0986  Last data filed at 3/21/2025 0640  Gross per 24 hour   Intake 240 ml   Output 1200 ml   Net -960 ml       Physical Exam:  General Appearance: Alert and oriented, blind, cooperative, no distress, appears stated age  Head: Normocephalic, without obvious abnormality, atraumatic  Back: no CVA tenderness  Lungs: respirations unlabored, no wheezing  Abdomen: Soft, mildly-tender, non-distended, no masses  Skin: Skin color, texture, turgor normal, no rashes or lesions  Neurologic: no gross deficits   Male :  Spontaneously voiding    Labs:  CBC   Lab Results   Component Value Date/Time    WBC 4.4 03/21/2025 07:09 AM    RBC 2.91 03/21/2025 07:09 AM    HGB 8.2 03/21/2025 07:09 AM    HCT 25.7 03/21/2025 07:09 AM    MCV 88.1 03/21/2025 07:09 AM    MCH 28.0 03/21/2025 07:09 AM    MCHC 31.8 03/21/2025 07:09 AM    RDW 18.6 03/21/2025 07:09 AM     03/21/2025 07:09

## 2025-03-21 NOTE — BRIEF OP NOTE
Brief Postoperative Note      Patient: Mani Chirinos  YOB: 1935  MRN: [id number]    Date of Procedure: 3/21/2025    Pre-Op Diagnosis Codes:      * Bladder tumor [D49.4]     * Gross hematuria [R31.0]    Post-Op Diagnosis: Same       Procedure(s):  CYSTOSCOPY, EVACUATION OF CLOTS. TRANSURETHRAL RESECTION OF BLADDER    Surgeon(s):  aCmilo Hernandez MD    Assistant:  Surgical Assistant: Paulo Villanueva    Anesthesia: General    Estimated Blood Loss (mL): Minimal    Complications: None    Specimens:   ID Type Source Tests Collected by Time Destination   A : BLADDER TUMOR Genital Penis SURGICAL PATHOLOGY Camilo Hernandez MD 3/21/2025 1516        Implants:  * No implants in log *      Drains:   Urinary Catheter 03/21/25 3 Way;[place] (Active)       Findings:  Infection Present At Time Of Surgery (PATOS) (choose all levels that have infection present):  No infection present  Other Findings: 3cm bladder tumor. Large clots removed from bladder  WILL LEAVE ON CBI O/N, WEAN TO CLEAR. [place] CAN COME OUT IN AM IF URINE CLEAR    Electronically signed by CAMILO HERNANDEZ MD on 3/21/2025 at 3:22 PM

## 2025-03-21 NOTE — OP NOTE
[address] [address]                            OPERATIVE REPORT      PATIENT NAME: JOELLE GALINDO         : 1935  MED REC NO: [id number]                      ROOM: 0350  ACCOUNT NO: [id number]                       ADMIT DATE: 2025  PROVIDER: Segun Pierce MD      DATE OF PROCEDURE:  2025    SURGEON:  Segun Pierce MD    PREOPERATIVE DIAGNOSES:  Gross hematuria and bladder mass.    POSTOPERATIVE DIAGNOSES:  Gross hematuria and bladder mass.    PROCEDURES PERFORMED:  Transurethral resection of bladder tumor and clot evacuation.    INDICATION FOR PROCEDURE:  The patient is a very pleasant 89-year-old gentleman, who is a patient of Dr. Bourgeois.  He has been having gross hematuria, and a CT scan was done showing a 5.6 cm bladder mass with clot.  He continued to bleed and was admitted last night for this gross hematuria, which ended up requiring a blood transfusion today.  He is added on today semi-urgently for a cysto, clot evacuation, and bladder tumor resection.    INTRAOPERATIVE FINDINGS:    1. Severe blood clots within the bladder.  2. 3 cm posterior bladder mass.    DESCRIPTION OF PROCEDURE:  After undergoing informed consent, the patient is taken to the operating room.  He is prepped and draped in a sterile fashion and given a dose of preoperative antibiotics.  Under general anesthesia, I insert the rigid scope into the penis.  There are no strictures.  The prostate looks like it has been treated in the past.  It is quite firm on the pelvis, it is a bit fixed.  I now introduce a camera into the bladder and I see nothing.  Therefore, we switch out to my continuous-flow resectoscope and then spend several minutes using an Ellik evacuator to remove probably 200 to 300 mL of clot.  Once this is done, the visualization is much better.  He is actually not bleeding at all.  On the posterior  bladder wall, he has a 3 cm tumor that is solitary.  I therefore switch out to my continuous-flow resectoscope and using bipolar energy, I easily resect this.  It appears very low grade and noninvasive.  I then fulgurate the base of this, and then this is sent off for pathologic analysis.  I now place a 22-Moldovan 3-way catheter and place my continuous bladder irrigation.  He is taken to the recovery room on CBI with plans to discontinue this tomorrow and do a voiding trial.  I will call with the path report in approximately 1 week, although I expect this again to be low-grade cancer.    ESTIMATED BLOOD LOSS:  Zero.          CAMILO HERNANDEZ MD      D:  03/21/2025 15:28:30     T:  03/21/2025 17:04:24     PMW/CORWIN  Job #:  [id number]     Doc#:  [id number]

## 2025-03-21 NOTE — CONSENT
Informed Consent for Blood Component Transfusion Note    I have discussed with the patient the rationale for blood component transfusion; its benefits in treating or preventing fatigue, organ damage, or death; and its risk which includes mild transfusion reactions, rare risk of blood borne infection, or more serious but rare reactions. I have discussed the alternatives to transfusion, including the risk and consequences of not receiving transfusion. The patient had an opportunity to ask questions and had agreed to proceed with transfusion of blood components.    Electronically signed by AVINASH Chambers CNP on 3/20/25 at 9:05 PM EDT

## 2025-03-21 NOTE — PROGRESS NOTES
Assessment completed and documented. VSS. A/ox4. Denies pain. Urine output bloody. Pt NPO since midnight for possible procedure. Bed locked and in lowest position. Bedside table and call light within reach. Denies further needs at this time.

## 2025-03-21 NOTE — PLAN OF CARE
Problem: ABCDS Injury Assessment  Goal: Absence of physical injury  Outcome: Progressing  Flowsheets (Taken 3/20/2025 2202)  Absence of Physical Injury: Implement safety measures based on patient assessment     Problem: Safety - Adult  Goal: Free from fall injury  Outcome: Progressing     Problem: Hematologic - Adult  Goal: Maintains hematologic stability  Flowsheets (Taken 3/20/2025 2202)  Maintains hematologic stability:   Assess for signs and symptoms of bleeding or hemorrhage   Administer blood products/factors as ordered   Monitor labs for bleeding or clotting disorders

## 2025-03-21 NOTE — PROGRESS NOTES
2104h Patients latest H/H of 6.7 and 21.4. NP cortes Crane informed, placed order for 1 unit PRBC transfusion. Consent for BT secured from patient.     2152h Transfusion of 1unit PRBC started. Monitored for any transfusion reaction.

## 2025-03-22 VITALS
SYSTOLIC BLOOD PRESSURE: 133 MMHG | RESPIRATION RATE: 16 BRPM | OXYGEN SATURATION: 98 % | BODY MASS INDEX: 25.64 KG/M2 | WEIGHT: 179.1 LBS | TEMPERATURE: 98.1 F | DIASTOLIC BLOOD PRESSURE: 67 MMHG | HEIGHT: 70 IN | HEART RATE: 75 BPM

## 2025-03-22 LAB
ANION GAP SERPL CALCULATED.3IONS-SCNC: 10 MMOL/L (ref 3–16)
BUN SERPL-MCNC: 21 MG/DL (ref 7–20)
CALCIUM SERPL-MCNC: 8.7 MG/DL (ref 8.3–10.6)
CHLORIDE SERPL-SCNC: 108 MMOL/L (ref 99–110)
CO2 SERPL-SCNC: 21 MMOL/L (ref 21–32)
CREAT SERPL-MCNC: 2 MG/DL (ref 0.8–1.3)
DEPRECATED RDW RBC AUTO: 19.1 % (ref 12.4–15.4)
GFR SERPLBLD CREATININE-BSD FMLA CKD-EPI: 31 ML/MIN/{1.73_M2}
GLUCOSE SERPL-MCNC: 128 MG/DL (ref 70–99)
HCT VFR BLD AUTO: 25 % (ref 40.5–52.5)
HCT VFR BLD AUTO: 25.7 % (ref 40.5–52.5)
HCT VFR BLD AUTO: 26.4 % (ref 40.5–52.5)
HCT VFR BLD AUTO: 26.7 % (ref 40.5–52.5)
HGB BLD-MCNC: 8 G/DL (ref 13.5–17.5)
HGB BLD-MCNC: 8.3 G/DL (ref 13.5–17.5)
HGB BLD-MCNC: 8.5 G/DL (ref 13.5–17.5)
HGB BLD-MCNC: 8.6 G/DL (ref 13.5–17.5)
MCH RBC QN AUTO: 28.3 PG (ref 26–34)
MCHC RBC AUTO-ENTMCNC: 32.3 G/DL (ref 31–36)
MCV RBC AUTO: 87.7 FL (ref 80–100)
PLATELET # BLD AUTO: 235 K/UL (ref 135–450)
PMV BLD AUTO: 7 FL (ref 5–10.5)
POTASSIUM SERPL-SCNC: 5 MMOL/L (ref 3.5–5.1)
RBC # BLD AUTO: 3.01 M/UL (ref 4.2–5.9)
SODIUM SERPL-SCNC: 139 MMOL/L (ref 136–145)
WBC # BLD AUTO: 8.1 K/UL (ref 4–11)

## 2025-03-22 PROCEDURE — 2500000003 HC RX 250 WO HCPCS: Performed by: INTERNAL MEDICINE

## 2025-03-22 PROCEDURE — 85027 COMPLETE CBC AUTOMATED: CPT

## 2025-03-22 PROCEDURE — 6370000000 HC RX 637 (ALT 250 FOR IP): Performed by: INTERNAL MEDICINE

## 2025-03-22 PROCEDURE — 36415 COLL VENOUS BLD VENIPUNCTURE: CPT

## 2025-03-22 PROCEDURE — 85014 HEMATOCRIT: CPT

## 2025-03-22 PROCEDURE — 80048 BASIC METABOLIC PNL TOTAL CA: CPT

## 2025-03-22 PROCEDURE — 85018 HEMOGLOBIN: CPT

## 2025-03-22 RX ADMIN — AMIODARONE HYDROCHLORIDE 200 MG: 200 TABLET ORAL at 08:59

## 2025-03-22 RX ADMIN — SODIUM CHLORIDE, PRESERVATIVE FREE 10 ML: 5 INJECTION INTRAVENOUS at 08:59

## 2025-03-22 RX ADMIN — ATORVASTATIN CALCIUM 10 MG: 10 TABLET, FILM COATED ORAL at 08:59

## 2025-03-22 RX ADMIN — PANTOPRAZOLE SODIUM 40 MG: 40 TABLET, DELAYED RELEASE ORAL at 06:15

## 2025-03-22 NOTE — PROGRESS NOTES
Catheter removed at this time. Pt tolerated well. Electronically signed by DIANE REY RN on 3/22/25 at 12:09 PM EDT

## 2025-03-22 NOTE — DISCHARGE INSTR - DIET

## 2025-03-22 NOTE — PLAN OF CARE
Bed alarm activated.     Problem: Safety - Adult  Goal: Free from fall injury  Outcome: Progressing

## 2025-03-22 NOTE — PROGRESS NOTES
Urology Attending Progress Note      Subjective: He is angry because he has not left the hospital yet. Bermudez removed this AM. He has voided once, . No discomfort.     Vitals:  /67   Pulse 75   Temp 98.1 °F (36.7 °C) (Oral)   Resp 16   Ht 1.778 m (5' 10\")   Wt 81.2 kg (179 lb 1.6 oz)   SpO2 98%   BMI 25.70 kg/m²   Temp  Av.7 °F (36.5 °C)  Min: 97.4 °F (36.3 °C)  Max: 98.1 °F (36.7 °C)    Intake/Output Summary (Last 24 hours) at 3/22/2025 1504  Last data filed at 3/22/2025 0754  Gross per 24 hour   Intake 500 ml   Output -4220 ml   Net 4720 ml       Exam: Sitting up in chair, appears comfortable.     Labs:  WBC:    Lab Results   Component Value Date/Time    WBC 8.1 2025 06:59 AM     Hemoglobin/Hematocrit:    Lab Results   Component Value Date/Time    HGB 8.0 2025 01:17 PM    HCT 25.0 2025 01:17 PM     BMP:    Lab Results   Component Value Date/Time     2025 06:59 AM    K 5.0 2025 06:59 AM     2025 06:59 AM    CO2 21 2025 06:59 AM    BUN 21 2025 06:59 AM    CREATININE 2.0 2025 06:59 AM    CALCIUM 8.7 2025 06:59 AM    GFRAA >60 10/25/2016 06:31 AM    LABGLOM 31 2025 06:59 AM    LABGLOM 32 2023 08:39 AM     PT/INR:    Lab Results   Component Value Date/Time    PROTIME 13.8 2025 10:50 AM    INR 1.04 2025 10:50 AM     PTT:    Lab Results   Component Value Date/Time    APTT 22.9 2025 10:50 AM   [APTT          Impression/Plan: - 89y.o. male with gross hematuria, bladder mass. Was to have outpatient TURBT next Friday but was symptomatic with dizziness/lightheadedness from anemia. POD1 sp cystoscopy, clot evac with TURBT.     -Bermudez removed this AM. He voided a small amount. PVR low.  -Fine to be discharged from our standpoint and he is very anxious to leave. Advised if there are problems when he is home with voiding he needs to visit the ER for eval  -We will call with pathology report and next  steps moving forward.       RIAN Greenberg

## 2025-03-22 NOTE — PROGRESS NOTES
Pt urinated 50mL of urine. Bladder scan complete with 130mL of urine in bladder. Pt is wanted to discharge at this time. Perfect serve sent to Dr. Aguiar. Electronically signed by DIANE REY RN on 3/22/25 at 2:50 PM EDT

## 2025-03-22 NOTE — PROGRESS NOTES
Perfect serve sent to RINA Call regarding plans for patients CBI. Can remove catheter and if patient can urinate, he can discharge per urology. Electronically signed by DIANE REY RN on 3/22/25 at 11:25 AM EDT

## 2025-03-22 NOTE — PROGRESS NOTES
Patient has been resting in bed. Has denied any complaints, discomforts, or pain. CBI continuing to flow. Light pink to clear output. See flowsheet.   Patient has been resting in bed sharing about his family and life adventures. Denies any needs.   Phelebotomist is in the room at this time. Bed alarm activated for safety. Call light in reach.     CONCETTA Mixon, MSN, RN.

## 2025-03-22 NOTE — DISCHARGE SUMMARY
V2.0  Discharge Summary    Name:  Mani Chirinos /Age/Sex: 1935 (89 y.o. male)   Admit Date: 3/20/2025  Discharge Date: 3/22/25    MRN & CSN:  [id number] & [id number] Encounter Date and Time 3/22/25 1:33 PM EDT    Attending:  Jennifer Pineda MD Discharging Provider: Jennifer Pineda MD       Hospital Course:     Brief HPI: Mani Chirinos is a 89 y.o. male who presented with gross hematuria lightheadedness    Brief Hospital course summary:   89 y.o. male who presented to [facility] with lightheadedness.  PMHx significant for HTN, BPH, CHF, CKD, PAF and blindness. He has been dealing with gross hematuria for there past month. He is followed by Urology and there is concern for bladder mass. He was scheduled for Cystoscopy. Pre-op evaluation noted anemia, Hb 7.2.  Patient developed worsening lightheadedness and came to the ED.  Urology was consulted who took the patient for cystoscopy status post transurethral resection of the bladder tumor and clot evacuation.  Hemoglobin is stable CBI was placed which has been removed urology has cleared the patient for discharge if able to pee discussed with RN medically stable for discharge.  CT scan of the abdomen and pelvis did show 5.6 cm bladder mass with a clot which was taken care of by urology.  Recommend follow-up with urology outpatient    Assessment and short plans of problems assessed and managed during hospital stay are as under:  Generalized weakness lightheadedness with acute severe symptomatic anemia in the setting of gross hematuria.  Status post 1 packed RBC transfusion.  Not on Eliquis anymore has been under consideration for Watchman.  Last Eliquis for complete/10/2025.  Underlying history of BPH with history of right nephrectomy with history of type in 2024  Benign essential hypertension resume home medication  Paroxysmal atrial fibrillation on amiodarone and Toprol recommend follow-up with cardiology for watchman

## 2025-03-22 NOTE — PROGRESS NOTES
Pt discharged home at this time. AVS reviewed with patient. All questions answered. Discharged to private vehicle via WC at this time. Electronically signed by DIANE REY RN on 3/22/25 at 3:20 PM EDT

## 2025-08-08 ENCOUNTER — HOSPITAL ENCOUNTER (OUTPATIENT)
Dept: LAB | Age: 89
Discharge: HOME | End: 2025-08-08
Payer: MEDICARE

## 2025-08-08 LAB
ALBUMIN SERPL-MCNC: 4.1 G/DL (ref 3.4–5)
ALBUMIN/GLOB SERPL: 1.6 {RATIO} (ref 1.1–2.2)
ALP SERPL-CCNC: 82 U/L (ref 40–129)
ALT SERPL-CCNC: 9 U/L (ref 10–40)
ANION GAP SERPL CALCULATED.3IONS-SCNC: 9 MMOL/L (ref 3–16)
AST SERPL-CCNC: 16 U/L (ref 15–37)
BILIRUB SERPL-MCNC: 0.8 MG/DL (ref 0–1)
BUN SERPL-MCNC: 20 MG/DL (ref 7–20)
CALCIUM SERPL-MCNC: 9.3 MG/DL (ref 8.3–10.6)
CHLORIDE SERPL-SCNC: 107 MMOL/L (ref 99–110)
CO2 SERPL-SCNC: 24 MMOL/L (ref 21–32)
CREAT SERPL-MCNC: 1.5 MG/DL (ref 0.8–1.3)
DEPRECATED RDW RBC AUTO: 15.2 % (ref 12.4–15.4)
GFR SERPLBLD CREATININE-BSD FMLA CKD-EPI: 44 ML/MIN/{1.73_M2}
GLUCOSE SERPL-MCNC: 106 MG/DL (ref 70–99)
HCT VFR BLD AUTO: 47.7 % (ref 40.5–52.5)
HGB BLD-MCNC: 15.5 G/DL (ref 13.5–17.5)
MCH RBC QN AUTO: 29 PG (ref 26–34)
MCHC RBC AUTO-ENTMCNC: 32.6 G/DL (ref 31–36)
MCV RBC AUTO: 89 FL (ref 80–100)
PLATELET # BLD AUTO: 203 K/UL (ref 135–450)
PMV BLD AUTO: 8.6 FL (ref 5–10.5)
POTASSIUM SERPL-SCNC: 4.1 MMOL/L (ref 3.5–5.1)
PROT SERPL-MCNC: 6.6 G/DL (ref 6.4–8.2)
RBC # BLD AUTO: 5.36 M/UL (ref 4.2–5.9)
SODIUM SERPL-SCNC: 140 MMOL/L (ref 136–145)
WBC # BLD AUTO: 5.6 K/UL (ref 4–11)

## 2025-08-08 PROCEDURE — 80053 COMPREHEN METABOLIC PANEL: CPT

## 2025-08-08 PROCEDURE — 85027 COMPLETE CBC AUTOMATED: CPT

## 2025-08-08 PROCEDURE — 87086 URINE CULTURE/COLONY COUNT: CPT

## 2025-08-08 PROCEDURE — 87077 CULTURE AEROBIC IDENTIFY: CPT

## 2025-08-08 PROCEDURE — 36415 COLL VENOUS BLD VENIPUNCTURE: CPT

## 2025-08-08 PROCEDURE — 87186 SC STD MICRODIL/AGAR DIL: CPT

## 2025-08-10 LAB
BACTERIA UR CULT: ABNORMAL
ORGANISM: ABNORMAL

## 2025-08-28 RX ORDER — M-VIT,TX,IRON,MINS/CALC/FOLIC 27MG-0.4MG
1 TABLET ORAL DAILY
COMMUNITY

## 2025-09-04 ENCOUNTER — ANESTHESIA EVENT (OUTPATIENT)
Dept: OPERATING ROOM | Age: 89
End: 2025-09-04
Payer: MEDICARE

## 2025-09-05 ENCOUNTER — HOSPITAL ENCOUNTER (OUTPATIENT)
Age: 89
Setting detail: OUTPATIENT SURGERY
Discharge: HOME | End: 2025-09-05
Attending: UROLOGY | Admitting: UROLOGY
Payer: MEDICARE

## 2025-09-05 ENCOUNTER — ANESTHESIA (OUTPATIENT)
Dept: OPERATING ROOM | Age: 89
End: 2025-09-05
Payer: MEDICARE

## 2025-09-05 ENCOUNTER — APPOINTMENT (OUTPATIENT)
Dept: GENERAL RADIOLOGY | Age: 89
End: 2025-09-05
Attending: UROLOGY
Payer: MEDICARE

## 2025-09-05 VITALS
SYSTOLIC BLOOD PRESSURE: 136 MMHG | DIASTOLIC BLOOD PRESSURE: 60 MMHG | HEIGHT: 70 IN | TEMPERATURE: 97.4 F | OXYGEN SATURATION: 97 % | WEIGHT: 174 LBS | BODY MASS INDEX: 24.91 KG/M2 | HEART RATE: 67 BPM | RESPIRATION RATE: 15 BRPM

## 2025-09-05 DIAGNOSIS — R31.0 GROSS HEMATURIA: ICD-10-CM

## 2025-09-05 PROCEDURE — 6360000002 HC RX W HCPCS: Performed by: NURSE ANESTHETIST, CERTIFIED REGISTERED

## 2025-09-05 PROCEDURE — 2500000003 HC RX 250 WO HCPCS: Performed by: UROLOGY

## 2025-09-05 PROCEDURE — 2580000003 HC RX 258: Performed by: ANESTHESIOLOGY

## 2025-09-05 PROCEDURE — 6360000002 HC RX W HCPCS: Performed by: UROLOGY

## 2025-09-05 RX ORDER — SODIUM CHLORIDE 0.9 % (FLUSH) 0.9 %
5-40 SYRINGE (ML) INJECTION EVERY 12 HOURS SCHEDULED
Status: DISCONTINUED | OUTPATIENT
Start: 2025-09-05 | End: 2025-09-05 | Stop reason: HOSPADM

## 2025-09-05 RX ORDER — DIPHENHYDRAMINE HYDROCHLORIDE 50 MG/ML
12.5 INJECTION, SOLUTION INTRAMUSCULAR; INTRAVENOUS
Status: DISCONTINUED | OUTPATIENT
Start: 2025-09-05 | End: 2025-09-05 | Stop reason: HOSPADM

## 2025-09-05 RX ORDER — PROPOFOL 10 MG/ML
INJECTION, EMULSION INTRAVENOUS
Status: DISCONTINUED | OUTPATIENT
Start: 2025-09-05 | End: 2025-09-05 | Stop reason: SDUPTHER

## 2025-09-05 RX ORDER — LABETALOL HYDROCHLORIDE 5 MG/ML
10 INJECTION, SOLUTION INTRAVENOUS
Status: DISCONTINUED | OUTPATIENT
Start: 2025-09-05 | End: 2025-09-05 | Stop reason: HOSPADM

## 2025-09-05 RX ORDER — SODIUM CHLORIDE, SODIUM LACTATE, POTASSIUM CHLORIDE, CALCIUM CHLORIDE 600; 310; 30; 20 MG/100ML; MG/100ML; MG/100ML; MG/100ML
INJECTION, SOLUTION INTRAVENOUS CONTINUOUS
Status: DISCONTINUED | OUTPATIENT
Start: 2025-09-05 | End: 2025-09-05 | Stop reason: HOSPADM

## 2025-09-05 RX ORDER — ONDANSETRON 2 MG/ML
4 INJECTION INTRAMUSCULAR; INTRAVENOUS
Status: DISCONTINUED | OUTPATIENT
Start: 2025-09-05 | End: 2025-09-05 | Stop reason: HOSPADM

## 2025-09-05 RX ORDER — LIDOCAINE HYDROCHLORIDE 10 MG/ML
1 INJECTION, SOLUTION EPIDURAL; INFILTRATION; INTRACAUDAL; PERINEURAL
Status: DISCONTINUED | OUTPATIENT
Start: 2025-09-05 | End: 2025-09-05 | Stop reason: HOSPADM

## 2025-09-05 RX ORDER — MIDAZOLAM HYDROCHLORIDE 1 MG/ML
2 INJECTION, SOLUTION INTRAMUSCULAR; INTRAVENOUS
Status: DISCONTINUED | OUTPATIENT
Start: 2025-09-05 | End: 2025-09-05 | Stop reason: HOSPADM

## 2025-09-05 RX ORDER — PROCHLORPERAZINE EDISYLATE 5 MG/ML
5 INJECTION INTRAMUSCULAR; INTRAVENOUS
Status: DISCONTINUED | OUTPATIENT
Start: 2025-09-05 | End: 2025-09-05 | Stop reason: HOSPADM

## 2025-09-05 RX ORDER — SODIUM CHLORIDE 9 MG/ML
INJECTION, SOLUTION INTRAVENOUS PRN
Status: DISCONTINUED | OUTPATIENT
Start: 2025-09-05 | End: 2025-09-05 | Stop reason: HOSPADM

## 2025-09-05 RX ORDER — GLYCOPYRROLATE 0.2 MG/ML
INJECTION INTRAMUSCULAR; INTRAVENOUS
Status: DISCONTINUED | OUTPATIENT
Start: 2025-09-05 | End: 2025-09-05 | Stop reason: SDUPTHER

## 2025-09-05 RX ORDER — SODIUM CHLORIDE 0.9 % (FLUSH) 0.9 %
5-40 SYRINGE (ML) INJECTION PRN
Status: DISCONTINUED | OUTPATIENT
Start: 2025-09-05 | End: 2025-09-05 | Stop reason: HOSPADM

## 2025-09-05 RX ORDER — DEXAMETHASONE SODIUM PHOSPHATE 4 MG/ML
INJECTION, SOLUTION INTRA-ARTICULAR; INTRALESIONAL; INTRAMUSCULAR; INTRAVENOUS; SOFT TISSUE
Status: DISCONTINUED | OUTPATIENT
Start: 2025-09-05 | End: 2025-09-05 | Stop reason: SDUPTHER

## 2025-09-05 RX ORDER — ONDANSETRON 2 MG/ML
INJECTION INTRAMUSCULAR; INTRAVENOUS
Status: DISCONTINUED | OUTPATIENT
Start: 2025-09-05 | End: 2025-09-05 | Stop reason: SDUPTHER

## 2025-09-05 RX ORDER — SULFAMETHOXAZOLE AND TRIMETHOPRIM 800; 160 MG/1; MG/1
1 TABLET ORAL 2 TIMES DAILY
Qty: 14 TABLET | Refills: 0 | Status: SHIPPED | OUTPATIENT
Start: 2025-09-05 | End: 2025-09-12

## 2025-09-05 RX ORDER — OXYCODONE HYDROCHLORIDE 5 MG/1
5 TABLET ORAL
Status: DISCONTINUED | OUTPATIENT
Start: 2025-09-05 | End: 2025-09-05 | Stop reason: HOSPADM

## 2025-09-05 RX ORDER — LEVOFLOXACIN 5 MG/ML
500 INJECTION, SOLUTION INTRAVENOUS
Status: COMPLETED | OUTPATIENT
Start: 2025-09-05 | End: 2025-09-05

## 2025-09-05 RX ORDER — IPRATROPIUM BROMIDE AND ALBUTEROL SULFATE 2.5; .5 MG/3ML; MG/3ML
1 SOLUTION RESPIRATORY (INHALATION)
Status: DISCONTINUED | OUTPATIENT
Start: 2025-09-05 | End: 2025-09-05 | Stop reason: HOSPADM

## 2025-09-05 RX ORDER — LIDOCAINE HYDROCHLORIDE 20 MG/ML
INJECTION, SOLUTION EPIDURAL; INFILTRATION; INTRACAUDAL; PERINEURAL
Status: DISCONTINUED | OUTPATIENT
Start: 2025-09-05 | End: 2025-09-05 | Stop reason: SDUPTHER

## 2025-09-05 RX ADMIN — DEXAMETHASONE SODIUM PHOSPHATE 8 MG: 4 INJECTION, SOLUTION INTRAMUSCULAR; INTRAVENOUS at 13:53

## 2025-09-05 RX ADMIN — LEVOFLOXACIN 500 MG: 5 INJECTION, SOLUTION INTRAVENOUS at 13:55

## 2025-09-05 RX ADMIN — GLYCOPYRROLATE 0.2 MG: 0.2 INJECTION, SOLUTION INTRAMUSCULAR; INTRAVENOUS at 14:10

## 2025-09-05 RX ADMIN — PROPOFOL 100 MG: 10 INJECTION, EMULSION INTRAVENOUS at 13:53

## 2025-09-05 RX ADMIN — SODIUM CHLORIDE, SODIUM LACTATE, POTASSIUM CHLORIDE, AND CALCIUM CHLORIDE: .6; .31; .03; .02 INJECTION, SOLUTION INTRAVENOUS at 13:48

## 2025-09-05 RX ADMIN — LIDOCAINE HYDROCHLORIDE 100 MG: 20 INJECTION, SOLUTION EPIDURAL; INFILTRATION; INTRACAUDAL at 13:52

## 2025-09-05 RX ADMIN — ONDANSETRON 4 MG: 2 INJECTION INTRAMUSCULAR; INTRAVENOUS at 13:53

## 2025-09-05 ASSESSMENT — PAIN SCALES - GENERAL
PAINLEVEL_OUTOF10: 0

## 2025-09-05 ASSESSMENT — PAIN - FUNCTIONAL ASSESSMENT: PAIN_FUNCTIONAL_ASSESSMENT: 0-10

## (undated) DEVICE — SOLUTION IRRIG 1000ML STRL H2O USP PLAS POUR BTL

## (undated) DEVICE — SOLUTION IRRIG 2000ML STRL H2O UROMATIC PLAS CONT USP

## (undated) DEVICE — BAG DRNGE COMB PK

## (undated) DEVICE — CUTTING LOOP, 27FR. ANGLED, STERILE: Brand: N.A.

## (undated) DEVICE — SET IRRIG L94IN DIA0.281IN L BOR W/ 2 N VENT SPIK 2 ON/OFF

## (undated) DEVICE — ELECTRODE ES BPLR WIRE DISP SUPERLOOP

## (undated) DEVICE — ADAPTER, OES PRO OUTER SHEATH TO ELLIK AND SYRINGE: Brand: OLYMPUS

## (undated) DEVICE — STERILE POLYISOPRENE POWDER-FREE SURGICAL GLOVES WITH EMOLLIENT COATING: Brand: PROTEXIS

## (undated) DEVICE — SYRINGE MED 10ML LUERLOCK TIP W/O SFTY DISP

## (undated) DEVICE — CATHETER F BLLN 5CC 18FR 2 W HYDRGEL COAT LESS TRAUM LUB

## (undated) DEVICE — CYSTO: Brand: MEDLINE INDUSTRIES, INC.

## (undated) DEVICE — SOLUTION IRRIG 2000ML 0.9% SOD CHL USP UROMATIC PLAS CONT

## (undated) DEVICE — SYRINGE,TOOMEY,IRRIGATION,70CC,STERILE: Brand: MEDLINE

## (undated) DEVICE — STERILE POLYISOPRENE POWDER-FREE SURGICAL GLOVES: Brand: PROTEXIS

## (undated) DEVICE — BLADDER EVACUATOR: Brand: UROVAC BLADDER EVACUATOR

## (undated) DEVICE — GLOVE ORANGE PI 8   MSG9080

## (undated) DEVICE — CONTAINER,SPECIMEN,OR STERILE,4OZ: Brand: MEDLINE

## (undated) DEVICE — CATHETER URETH 22FR 30CC BLLN F 3 W SPEC M RND TIP TWO